# Patient Record
Sex: MALE | Race: WHITE | NOT HISPANIC OR LATINO | Employment: OTHER | URBAN - METROPOLITAN AREA
[De-identification: names, ages, dates, MRNs, and addresses within clinical notes are randomized per-mention and may not be internally consistent; named-entity substitution may affect disease eponyms.]

---

## 2017-06-27 ENCOUNTER — TRANSCRIBE ORDERS (OUTPATIENT)
Dept: ADMINISTRATIVE | Facility: HOSPITAL | Age: 77
End: 2017-06-27

## 2017-06-27 ENCOUNTER — APPOINTMENT (OUTPATIENT)
Dept: LAB | Facility: HOSPITAL | Age: 77
End: 2017-06-27
Payer: MEDICARE

## 2017-06-27 DIAGNOSIS — R31.0 GROSS HEMATURIA: Primary | ICD-10-CM

## 2017-06-27 PROCEDURE — 88112 CYTOPATH CELL ENHANCE TECH: CPT

## 2017-06-30 ENCOUNTER — HOSPITAL ENCOUNTER (OUTPATIENT)
Dept: RADIOLOGY | Facility: HOSPITAL | Age: 77
Discharge: HOME | End: 2017-06-30
Payer: MEDICARE

## 2017-06-30 DIAGNOSIS — R31.0 GROSS HEMATURIA: ICD-10-CM

## 2017-06-30 PROCEDURE — 74178 CT ABD&PLV WO CNTR FLWD CNTR: CPT

## 2017-06-30 RX ADMIN — IOHEXOL 100 ML: 350 INJECTION, SOLUTION INTRAVENOUS at 08:12

## 2017-07-26 ENCOUNTER — ALLSCRIPTS OFFICE VISIT (OUTPATIENT)
Dept: OTHER | Facility: OTHER | Age: 77
End: 2017-07-26

## 2017-07-26 LAB
BILIRUB UR QL STRIP: NORMAL
CLARITY UR: NORMAL
COLOR UR: YELLOW
GLUCOSE (HISTORICAL): NORMAL
HGB UR QL STRIP.AUTO: NORMAL
KETONES UR STRIP-MCNC: NORMAL MG/DL
LEUKOCYTE ESTERASE UR QL STRIP: NORMAL
NITRITE UR QL STRIP: NORMAL
PH UR STRIP.AUTO: 5.5 [PH]
PROT UR STRIP-MCNC: 30 MG/DL
SP GR UR STRIP.AUTO: 1.02
UROBILINOGEN UR QL STRIP.AUTO: 0.2

## 2017-12-03 ENCOUNTER — APPOINTMENT (EMERGENCY)
Dept: RADIOLOGY | Facility: HOSPITAL | Age: 77
End: 2017-12-03
Payer: MEDICARE

## 2017-12-03 ENCOUNTER — HOSPITAL ENCOUNTER (EMERGENCY)
Facility: HOSPITAL | Age: 77
Discharge: HOME | End: 2017-12-03
Attending: EMERGENCY MEDICINE | Admitting: EMERGENCY MEDICINE
Payer: MEDICARE

## 2017-12-03 VITALS
WEIGHT: 190 LBS | OXYGEN SATURATION: 96 % | HEART RATE: 84 BPM | RESPIRATION RATE: 19 BRPM | TEMPERATURE: 98.7 F | HEIGHT: 66 IN | SYSTOLIC BLOOD PRESSURE: 153 MMHG | DIASTOLIC BLOOD PRESSURE: 81 MMHG | BODY MASS INDEX: 30.53 KG/M2

## 2017-12-03 DIAGNOSIS — R31.9 HEMATURIA: ICD-10-CM

## 2017-12-03 DIAGNOSIS — N20.0 KIDNEY STONE: Primary | ICD-10-CM

## 2017-12-03 DIAGNOSIS — R10.9 FLANK PAIN: ICD-10-CM

## 2017-12-03 LAB
ALBUMIN SERPL BCP-MCNC: 3.9 G/DL (ref 3.5–5)
ALP SERPL-CCNC: 66 U/L (ref 46–116)
ALT SERPL W P-5'-P-CCNC: 32 U/L (ref 12–78)
ANION GAP SERPL CALCULATED.3IONS-SCNC: 6 MMOL/L (ref 4–13)
AST SERPL W P-5'-P-CCNC: 23 U/L (ref 5–45)
ATRIAL RATE: 75 BPM
BACTERIA UR QL AUTO: ABNORMAL /HPF
BASOPHILS # BLD AUTO: 0.04 THOUSANDS/ΜL (ref 0–0.1)
BASOPHILS NFR BLD AUTO: 1 % (ref 0–1)
BILIRUB SERPL-MCNC: 0.5 MG/DL (ref 0.2–1)
BILIRUB UR QL STRIP: ABNORMAL
BUN SERPL-MCNC: 22 MG/DL (ref 5–25)
CALCIUM SERPL-MCNC: 8.6 MG/DL (ref 8.3–10.1)
CHLORIDE SERPL-SCNC: 105 MMOL/L (ref 100–108)
CLARITY UR: ABNORMAL
CO2 SERPL-SCNC: 29 MMOL/L (ref 21–32)
COLOR UR: YELLOW
CREAT SERPL-MCNC: 1.2 MG/DL (ref 0.6–1.3)
EOSINOPHIL # BLD AUTO: 0.11 THOUSAND/ΜL (ref 0–0.61)
EOSINOPHIL NFR BLD AUTO: 1 % (ref 0–6)
ERYTHROCYTE [DISTWIDTH] IN BLOOD BY AUTOMATED COUNT: 13.5 % (ref 11.6–15.1)
GFR SERPL CREATININE-BSD FRML MDRD: 58 ML/MIN/1.73SQ M
GLUCOSE SERPL-MCNC: 96 MG/DL (ref 65–140)
GLUCOSE UR STRIP-MCNC: NEGATIVE MG/DL
HCT VFR BLD AUTO: 41.5 % (ref 36.5–49.3)
HGB BLD-MCNC: 14.5 G/DL (ref 12–17)
HGB UR QL STRIP.AUTO: ABNORMAL
HYALINE CASTS #/AREA URNS LPF: ABNORMAL /LPF
KETONES UR STRIP-MCNC: NEGATIVE MG/DL
LEUKOCYTE ESTERASE UR QL STRIP: NEGATIVE
LYMPHOCYTES # BLD AUTO: 1.32 THOUSANDS/ΜL (ref 0.6–4.47)
LYMPHOCYTES NFR BLD AUTO: 15 % (ref 14–44)
MCH RBC QN AUTO: 33.6 PG (ref 26.8–34.3)
MCHC RBC AUTO-ENTMCNC: 34.9 G/DL (ref 31.4–37.4)
MCV RBC AUTO: 96 FL (ref 82–98)
MONOCYTES # BLD AUTO: 1.05 THOUSAND/ΜL (ref 0.17–1.22)
MONOCYTES NFR BLD AUTO: 12 % (ref 4–12)
MUCOUS THREADS UR QL AUTO: ABNORMAL
NEUTROPHILS # BLD AUTO: 6.07 THOUSANDS/ΜL (ref 1.85–7.62)
NEUTS SEG NFR BLD AUTO: 71 % (ref 43–75)
NITRITE UR QL STRIP: NEGATIVE
NON-SQ EPI CELLS URNS QL MICRO: ABNORMAL /HPF
NRBC BLD AUTO-RTO: 0 /100 WBCS
P AXIS: 34 DEGREES
PH UR STRIP.AUTO: 5.5 [PH] (ref 4.5–8)
PLATELET # BLD AUTO: 194 THOUSANDS/UL (ref 149–390)
PMV BLD AUTO: 9.8 FL (ref 8.9–12.7)
POTASSIUM SERPL-SCNC: 3.7 MMOL/L (ref 3.5–5.3)
PR INTERVAL: 168 MS
PROT SERPL-MCNC: 7.3 G/DL (ref 6.4–8.2)
PROT UR STRIP-MCNC: ABNORMAL MG/DL
QRS AXIS: -19 DEGREES
QRSD INTERVAL: 102 MS
QT INTERVAL: 406 MS
QTC INTERVAL: 453 MS
RBC # BLD AUTO: 4.31 MILLION/UL (ref 3.88–5.62)
RBC #/AREA URNS AUTO: ABNORMAL /HPF
SODIUM SERPL-SCNC: 140 MMOL/L (ref 136–145)
SP GR UR STRIP.AUTO: >=1.03 (ref 1–1.03)
SPECIMEN SOURCE: NORMAL
T WAVE AXIS: 1 DEGREES
TROPONIN I BLD-MCNC: 0 NG/ML (ref 0–0.08)
UROBILINOGEN UR QL STRIP.AUTO: 1 E.U./DL
VENTRICULAR RATE: 75 BPM
WBC # BLD AUTO: 8.61 THOUSAND/UL (ref 4.31–10.16)
WBC #/AREA URNS AUTO: ABNORMAL /HPF

## 2017-12-03 PROCEDURE — 36415 COLL VENOUS BLD VENIPUNCTURE: CPT | Performed by: EMERGENCY MEDICINE

## 2017-12-03 PROCEDURE — 84484 ASSAY OF TROPONIN QUANT: CPT

## 2017-12-03 PROCEDURE — 81002 URINALYSIS NONAUTO W/O SCOPE: CPT | Performed by: EMERGENCY MEDICINE

## 2017-12-03 PROCEDURE — 99284 EMERGENCY DEPT VISIT MOD MDM: CPT

## 2017-12-03 PROCEDURE — 96361 HYDRATE IV INFUSION ADD-ON: CPT

## 2017-12-03 PROCEDURE — 80053 COMPREHEN METABOLIC PANEL: CPT | Performed by: EMERGENCY MEDICINE

## 2017-12-03 PROCEDURE — 93005 ELECTROCARDIOGRAM TRACING: CPT | Performed by: EMERGENCY MEDICINE

## 2017-12-03 PROCEDURE — 85025 COMPLETE CBC W/AUTO DIFF WBC: CPT | Performed by: EMERGENCY MEDICINE

## 2017-12-03 PROCEDURE — 81001 URINALYSIS AUTO W/SCOPE: CPT

## 2017-12-03 PROCEDURE — 96360 HYDRATION IV INFUSION INIT: CPT

## 2017-12-03 PROCEDURE — 74177 CT ABD & PELVIS W/CONTRAST: CPT

## 2017-12-03 RX ORDER — VENLAFAXINE HYDROCHLORIDE 150 MG/1
150 CAPSULE, EXTENDED RELEASE ORAL DAILY
COMMUNITY
End: 2019-06-26

## 2017-12-03 RX ORDER — MECLIZINE HYDROCHLORIDE 25 MG/1
25 TABLET ORAL EVERY 8 HOURS PRN
COMMUNITY

## 2017-12-03 RX ORDER — CARVEDILOL 6.25 MG/1
12.5 TABLET ORAL 2 TIMES DAILY WITH MEALS
COMMUNITY

## 2017-12-03 RX ORDER — ROSUVASTATIN CALCIUM 20 MG/1
20 TABLET, COATED ORAL DAILY
COMMUNITY
End: 2018-03-19 | Stop reason: SDUPTHER

## 2017-12-03 RX ORDER — ASPIRIN 325 MG
325 TABLET ORAL DAILY
COMMUNITY
End: 2018-03-19 | Stop reason: SDUPTHER

## 2017-12-03 RX ORDER — TIZANIDINE HYDROCHLORIDE 2 MG/1
2 CAPSULE, GELATIN COATED ORAL 3 TIMES DAILY PRN
Status: ON HOLD | COMMUNITY
End: 2018-01-19 | Stop reason: ALTCHOICE

## 2017-12-03 RX ORDER — PANTOPRAZOLE SODIUM 40 MG/1
40 TABLET, DELAYED RELEASE ORAL DAILY
COMMUNITY
End: 2018-04-16

## 2017-12-03 RX ORDER — VITAMIN B COMPLEX
1 CAPSULE ORAL DAILY
Status: ON HOLD | COMMUNITY
End: 2021-07-06 | Stop reason: ALTCHOICE

## 2017-12-03 RX ORDER — AMLODIPINE BESYLATE 5 MG/1
2.5 TABLET ORAL DAILY
COMMUNITY
End: 2018-03-19 | Stop reason: SDUPTHER

## 2017-12-03 RX ADMIN — SODIUM CHLORIDE 500 ML: 0.9 INJECTION, SOLUTION INTRAVENOUS at 14:44

## 2017-12-03 RX ADMIN — IOHEXOL 100 ML: 350 INJECTION, SOLUTION INTRAVENOUS at 15:57

## 2017-12-03 NOTE — ED ATTENDING ATTESTATION
Greg Jones MD, saw and evaluated the patient  I have discussed the patient with the resident/non-physician practitioner and agree with the resident's/non-physician practitioner's findings, Plan of Care, and MDM as documented in the resident's/non-physician practitioner's note, except where noted  All available labs and Radiology studies were reviewed  At this point I agree with the current assessment done in the Emergency Department  I have conducted an independent evaluation of this patient a history and physical is as follows:  Patient woke up with left flank plain  Severe for about 20 minutes patient had urge to void and moved bowels and do neither  Pain is much better at present    Patient has had kidney stones in the past   Bedside ultrasound by Dr Bj Lopez showed 2 apparent lumens will get formal CT to make sure patient does not have abdominal aortic aneurysm or dissection      Critical Care Time  CritCare Time

## 2017-12-03 NOTE — ED PROVIDER NOTES
History  Chief Complaint   Patient presents with    Flank Pain     Pt has c/o L flank pain since last night  Pt has hx of kidney stones     This is a 66-year-old male presents for evaluation of left flank pain that woke him up out of sleep at 3 in the morning  Patient describes sharp left-sided pain radiating into his groin, with no relieving or exacerbating factors, not affected by position, no associated nausea or vomiting, no associated urinary symptoms  This pain was episodic and  resolved on its own  Patient went to the store with his wife and while at the store had recurrence of the same pain  The pain lasted 20-30 minutes and then subsided on it's own  He tried to go to the bathroom and noticed decreased urination at this time  There was no hematuria  He has a history of kidney stones in the past which required lithotripsy by Dr Kristine Alpers, Urology  He does state that this pain is different  No hx of abdominal surgery, no nausea or vomiting, no CP/SOB  No melena or hematochezia  Currently pain is resolved  Prior to Admission Medications   Prescriptions Last Dose Informant Patient Reported? Taking?    CALCIUM CARBONATE-VITAMIN D PO   Yes Yes   Sig: Take 1 tablet by mouth daily   TiZANidine (ZANAFLEX) 2 MG capsule   Yes Yes   Sig: Take 2 mg by mouth every 6 (six) hours   amLODIPine (NORVASC) 5 mg tablet   Yes Yes   Sig: Take 2 5 mg by mouth daily   aspirin 325 mg tablet   Yes Yes   Sig: Take 325 mg by mouth daily   b complex vitamins capsule   Yes Yes   Sig: Take 1 capsule by mouth daily   carvedilol (COREG) 6 25 mg tablet   Yes Yes   Sig: Take 12 5 mg by mouth 2 (two) times a day with meals   meclizine (ANTIVERT) 25 mg tablet   Yes Yes   Sig: Take 32 mg by mouth 3 (three) times a day as needed for dizziness Spouse states patient has been taking this medication for last 2 weeks   pantoprazole (PROTONIX) 40 mg tablet   Yes Yes   Sig: Take 40 mg by mouth daily   rosuvastatin (CRESTOR) 20 MG tablet Yes Yes   Sig: Take 20 mg by mouth daily   venlafaxine (EFFEXOR-XR) 150 mg 24 hr capsule   Yes Yes   Sig: Take 225 mg by mouth daily      Facility-Administered Medications: None       Past Medical History:   Diagnosis Date    Cancer [facility])     Cardiac disease     Hyperlipidemia     Hypertension     Kidney stone     Prostate CA [facility])        History reviewed  No pertinent surgical history  History reviewed  No pertinent family history  I have reviewed and agree with the history as documented  Social History   Substance Use Topics    Smoking status: Current Some Day Smoker     Types: Cigars    Smokeless tobacco: Current User    Alcohol use Yes        Review of Systems   Constitutional: Negative for appetite change, chills and fever  HENT: Negative for rhinorrhea and sore throat  Eyes: Negative for photophobia and visual disturbance  Respiratory: Negative for cough and shortness of breath  Cardiovascular: Negative for chest pain and palpitations  Gastrointestinal: Positive for abdominal pain  Negative for diarrhea  Genitourinary: Positive for decreased urine volume  Negative for dysuria, frequency and urgency  Skin: Negative for rash  Neurological: Negative for dizziness and weakness  All other systems reviewed and are negative  Physical Exam  ED Triage Vitals [12/03/17 1356]   Temperature Pulse Respirations Blood Pressure SpO2   98 7 °F (37 1 °C) 84 18 149/79 99 %      Temp Source Heart Rate Source Patient Position - Orthostatic VS BP Location FiO2 (%)   Tympanic Monitor Sitting Left arm --      Pain Score       6           Orthostatic Vital Signs  Vitals:    12/03/17 1356 12/03/17 1500 12/03/17 1615   BP: 149/79 131/86 153/81   Pulse: 84 82 84   Patient Position - Orthostatic VS: Sitting         Physical Exam   Constitutional: He is oriented to person, place, and time  He appears well-developed and well-nourished  HENT:   Head: Normocephalic and atraumatic     Right Ear: External ear normal    Left Ear: External ear normal    Mouth/Throat: Oropharynx is clear and moist    Eyes: Conjunctivae and EOM are normal  Pupils are equal, round, and reactive to light  Neck: Normal range of motion  Neck supple  No JVD present  No tracheal deviation present  Cardiovascular: Normal rate, regular rhythm and normal heart sounds  Exam reveals no gallop and no friction rub  No murmur heard  Pulmonary/Chest: Effort normal  No stridor  No respiratory distress  He has no wheezes  He has no rales  Abdominal: Soft  He exhibits no distension and no mass  There is no tenderness  There is no rebound and no guarding  Left flank tender to palpation, abdomen soft without any rebound or guarding, normal bowel sounds   Musculoskeletal: Normal range of motion  He exhibits no edema  Neurological: He is alert and oriented to person, place, and time  No cranial nerve deficit  Skin: Skin is warm and dry  No rash noted  No erythema  No pallor  Psychiatric: He has a normal mood and affect  Nursing note and vitals reviewed        ED Medications  Medications   sodium chloride 0 9 % bolus 500 mL (0 mL Intravenous Stopped 12/3/17 1650)   iohexol (OMNIPAQUE) 350 MG/ML injection (MULTI-DOSE) 100 mL (100 mL Intravenous Given 12/3/17 1557)       Diagnostic Studies  Results Reviewed     Procedure Component Value Units Date/Time    Urine Microscopic [[id number]]  (Abnormal) Collected:  12/03/17 1506    Lab Status:  Final result Specimen:  Urine from Urine, Other Updated:  12/03/17 1606     RBC, UA 30-50 (A) /hpf      WBC, UA 2-4 (A) /hpf      Epithelial Cells None Seen /hpf      Bacteria, UA None Seen /hpf      Hyaline Casts, UA 5-10 (A) /lpf      MUCOUS THREADS Moderate    Comprehensive metabolic panel [[id number]] Collected:  12/03/17 1444    Lab Status:  Final result Specimen:  Blood from Arm, Right Updated:  12/03/17 1528     Sodium 140 mmol/L      Potassium 3 7 mmol/L      Chloride 105 mmol/L      CO2 29 mmol/L      Anion Gap 6 mmol/L      BUN 22 mg/dL      Creatinine 1 20 mg/dL      Glucose 96 mg/dL      Calcium 8 6 mg/dL      AST 23 U/L      ALT 32 U/L      Alkaline Phosphatase 66 U/L      Total Protein 7 3 g/dL      Albumin 3 9 g/dL      Total Bilirubin 0 50 mg/dL      eGFR 58 ml/min/1 73sq m     Narrative:         National Kidney Disease Education Program recommendations are as follows:  GFR calculation is accurate only with a steady state creatinine  Chronic Kidney disease less than 60 ml/min/1 73 sq  meters  Kidney failure less than 15 ml/min/1 73 sq  meters  POCT urinalysis dipstick [[id number]]  (Abnormal) Resulted:  12/03/17 1509    Lab Status:  Final result Updated:  12/03/17 1509    ED Urine Macroscopic [[id number]]  (Abnormal) Collected:  12/03/17 1506    Lab Status:  Final result Specimen:  Urine Updated:  12/03/17 1507     Color, UA Yellow     Clarity, UA Cloudy     pH, UA 5 5     Leukocytes, UA Negative     Nitrite, UA Negative     Protein,  (2+) (A) mg/dl      Glucose, UA Negative mg/dl      Ketones, UA Negative mg/dl      Urobilinogen, UA 1 0 E U /dl      Bilirubin, UA Interference- unable to analyze (A)     Blood, UA Large (A)     Specific Gravity, UA >=1 030    Narrative:       CLINITEK RESULT    POCT troponin [[id number]]  (Normal) Collected:  12/03/17 1444    Lab Status:  Final result Updated:  12/03/17 1458     POC Troponin I 0 00 ng/ml      Specimen Type VENOUS    Narrative:         [employer] i-Stat handheld analyzer 99% cutoff is > 0 08ng/mL in [facility] Emergency Departments    o cTnI 99% cutoff is useful only when applied to patients in the clinical setting of myocardial ischemia  o cTnI 99% cutoff should be interpreted in the context of clinical history, ECG findings and possibly cardiac imaging to establish correct diagnosis  o cTnI 99% cutoff may be suggestive but clearly not indicative of a coronary event without the clinical setting of myocardial ischemia      CBC and differential [[id number]]  (Normal) Collected:  12/03/17 1444    Lab Status:  Final result Specimen:  Blood from Arm, Right Updated:  12/03/17 1455     WBC 8 61 Thousand/uL      RBC 4 31 Million/uL      Hemoglobin 14 5 g/dL      Hematocrit 41 5 %      MCV 96 fL      MCH 33 6 pg      MCHC 34 9 g/dL      RDW 13 5 %      MPV 9 8 fL      Platelets 978 Thousands/uL      nRBC 0 /100 WBCs      Neutrophils Relative 71 %      Lymphocytes Relative 15 %      Monocytes Relative 12 %      Eosinophils Relative 1 %      Basophils Relative 1 %      Neutrophils Absolute 6 07 Thousands/µL      Lymphocytes Absolute 1 32 Thousands/µL      Monocytes Absolute 1 05 Thousand/µL      Eosinophils Absolute 0 11 Thousand/µL      Basophils Absolute 0 04 Thousands/µL                  CT abdomen pelvis w contrast   Final Result by Baltazar Skaggs MD (12/03 1645)         1  2 mm calculus within the left ureterovesical junction resulting in mild hydroureteronephrosis  There is increased left perinephric stranding with somewhat asymmetric phase of enhancement of the left kidney compared to the right, suggestive of    obstructive uropathy  2  Nonobstructing calculus within the mid to lower pole of the right kidney measuring 5 mm  There is mild prominence of the proximal right ureter but without evidence of ureteral calculus  Workstation performed: [id number]               Procedures  Procedures      Phone Consults  ED Phone Contact    ED Course  ED Course as of Dec 03 1756   Jacob Shultz Dec 03, 2017   1439 Bedside ultrasound +L sided hydronephrosis, normal aortic diameter, hypoechoic density of unclear significance posterior to aorta            Identification of Seniors at [address] [place] [address] Most Recent Value   (ISAR) Identification of Seniors at Risk   Before the illness or injury that brought you to the Emergency, did you need someone to help you on a regular basis?   0 Filed at: 12/03/2017 1359   In the last 24 hours, have you needed more help than usual?  0 Filed at: 12/03/2017 1359   Have you been hospitalized for one or more nights during the past 6 months? 0 Filed at: 12/03/2017 1359   In general, do you see well?  0 Filed at: 12/03/2017 1359   In general, do you have serious problems with your memory? 0 Filed at: 12/03/2017 1359   Do you take more than three different medications every day? No data   ISAR Score  0 Filed at: 12/03/2017 1359                          [facility]  Number of Diagnoses or Management Options  Diagnosis management comments: 63-year-old male presents for evaluation of left flank pain, will obtain CBC, CMP to evaluate kidney function, will obtain urinalysis to evaluate for hematuria or urinary tract infection  Will obtain CT abdomen to evaluate the nature of pain, kidney stone vs aortic pathology  Will obtain EKG/Trop given location of pain    CritCare Time    Disposition  Final diagnoses:   Kidney stone   Flank pain   Hematuria     Time reflects when diagnosis was documented in both MDM as applicable and the Disposition within this note     Time User Action Codes Description Comment    12/3/2017  4:13 PM Sandra Armando Add [N20 0] Kidney stone     12/3/2017  4:13 PM Sandra Armando Add [R10 9] Flank pain     12/3/2017  4:13 PM Sandra Armando Add [R31 9] Hematuria       ED Disposition     ED Disposition Condition Comment    [facility]self care      Condition at discharge: Stable        Follow-up Information     Follow up With Specialties Details Why Contact Info Additional [address] Emergency Department Emergency Medicine  If symptoms worsen [address]  [phone]  ED, [address], [place]  [address] [facility]  As needed [address] [facility]  [address] [facility]       Lavonne Acuna MD Urology Schedule an appointment as soon as possible for a visit in 2 days  [phone] 72 [address]  [phone]           Discharge Medication List as of 12/3/2017  5:02 PM      CONTINUE these medications which have NOT CHANGED    Details   amLODIPine (NORVASC) 5 mg tablet Take 2 5 mg by mouth daily, Historical Med      aspirin 325 mg tablet Take 325 mg by mouth daily, Historical Med      b complex vitamins capsule Take 1 capsule by mouth daily, Historical Med      CALCIUM CARBONATE-VITAMIN D PO Take 1 tablet by mouth daily, Historical Med      carvedilol (COREG) 6 25 mg tablet Take 12 5 mg by mouth 2 (two) times a day with meals, Historical Med      meclizine (ANTIVERT) 25 mg tablet Take 32 mg by mouth 3 (three) times a day as needed for dizziness Spouse states patient has been taking this medication for last 2 weeks, Historical Med      pantoprazole (PROTONIX) 40 mg tablet Take 40 mg by mouth daily, Historical Med      rosuvastatin (CRESTOR) 20 MG tablet Take 20 mg by mouth daily, Historical Med      TiZANidine (ZANAFLEX) 2 MG capsule Take 2 mg by mouth every 6 (six) hours, Historical Med      venlafaxine (EFFEXOR-XR) 150 mg 24 hr capsule Take 225 mg by mouth daily, Historical Med           No discharge procedures on file  ED Provider  Attending physically available and evaluated [facility]  I managed the patient along with the ED Attending      Electronically Signed by         Jazmyne Blakely MD  Resident  12/03/17 9341

## 2017-12-03 NOTE — DISCHARGE INSTRUCTIONS
Please return to the Emergency Department if your symptoms fail to get better or you develop new, concerning symptoms  If you develop fever, chills, worsening pain, trouble peeing, nausea or vomiting seek care immediately  Otherwise follow up with your primary care provider in the next 2-3 days for further care  Kidney Stones   WHAT YOU NEED TO KNOW:   Kidney stones form in the urinary system when the water and waste in your urine are out of balance  When this happens, certain types of waste crystals separate from the urine  The crystals build up and form kidney stones  You may have 1 or more kidney stones  DISCHARGE INSTRUCTIONS:   Return to the emergency department if:   · You have vomiting that is not relieved by medicine  Contact your healthcare provider if:   · You have a fever  · You have trouble passing urine  · You see blood in your urine  · You have severe pain  · You have any questions or concerns about your condition or care  Medicines:   · NSAIDs , such as ibuprofen, help decrease swelling, pain, and fever  This medicine is available with or without a doctor's order  NSAIDs can cause stomach bleeding or kidney problems in certain people  If you take blood thinner medicine, always ask your healthcare provider if NSAIDs are safe for you  Always read the medicine label and follow directions  · Prescription medicine  may be given  Ask how to take this medicine safely  · Medicines  to balance your electrolytes may be needed  · Take your medicine as directed  Contact your healthcare provider if you think your medicine is not helping or if you have side effects  Tell him or her if you are allergic to any medicine  Keep a list of the medicines, vitamins, and herbs you take  Include the amounts, and when and why you take them  Bring the list or the pill bottles to follow-up visits  Carry your medicine list with you in case of an emergency    Follow up with your healthcare provider as directed: You may need to return for more tests  Write down your questions so you remember to ask them during your visits  Self-care:   · Drink plenty of liquids  Your healthcare provider may tell you to drink at least 8 to 12 (eight-ounce) cups of liquids each day  This helps flush out the kidney stones when you urinate  Water is the best liquid to drink  · Strain your urine every time you go to the bathroom  Urinate through a strainer or a piece of thin cloth to catch the stones  Take the stones to your healthcare provider so they can be sent to the lab for tests  This will help your healthcare providers plan the best treatment for you  · Eat a variety of healthy foods  Healthy foods include fruits, vegetables, whole-grain breads, low-fat dairy products, beans, and fish  You may need to limit how much sodium (salt) or protein you eat  Ask for information about the best foods for you  · Stay active  Your stones may pass more easily by if you stay active  Ask about the best activities for you  After you pass your kidney stones:  Once you have passed your kidney stones, your healthcare provider may  order a 24-hour urine test  Results from a 24-hour urine test will help your healthcare provider plan ways to prevent more stones from forming  If you are told to do a 24-hour test, your healthcare provider will give you more instructions  © 2017 [employer] Information is for End User's use only and may not be sold, redistributed or otherwise used for commercial purposes  All illustrations and images included in CareNotes® are the copyrighted property of [employer]  or Denzel Leong  The above information is an  only  It is not intended as medical advice for individual conditions or treatments  Talk to your doctor, nurse or pharmacist before following any medical regimen to see if it is safe and effective for you

## 2017-12-24 ENCOUNTER — HOSPITAL ENCOUNTER (INPATIENT)
Facility: HOSPITAL | Age: 77
LOS: 1 days | Discharge: HOME | DRG: 694 | End: 2017-12-25
Attending: EMERGENCY MEDICINE | Admitting: GENERAL PRACTICE
Payer: MEDICARE

## 2017-12-24 ENCOUNTER — APPOINTMENT (EMERGENCY)
Dept: RADIOLOGY | Facility: HOSPITAL | Age: 77
DRG: 694 | End: 2017-12-24
Payer: MEDICARE

## 2017-12-24 DIAGNOSIS — N20.0 KIDNEY STONE: ICD-10-CM

## 2017-12-24 DIAGNOSIS — R10.9 RIGHT FLANK PAIN: Primary | ICD-10-CM

## 2017-12-24 PROBLEM — I16.0 HYPERTENSIVE URGENCY: Status: ACTIVE | Noted: 2017-12-24

## 2017-12-24 LAB
ALBUMIN SERPL BCP-MCNC: 3.8 G/DL (ref 3.5–5)
ALP SERPL-CCNC: 76 U/L (ref 46–116)
ALT SERPL W P-5'-P-CCNC: 33 U/L (ref 12–78)
ANION GAP SERPL CALCULATED.3IONS-SCNC: 7 MMOL/L (ref 4–13)
AST SERPL W P-5'-P-CCNC: 23 U/L (ref 5–45)
ATRIAL RATE: 72 BPM
BACTERIA UR QL AUTO: ABNORMAL /HPF
BASOPHILS # BLD AUTO: 0.05 THOUSANDS/ΜL (ref 0–0.1)
BASOPHILS NFR BLD AUTO: 1 % (ref 0–1)
BILIRUB SERPL-MCNC: 0.52 MG/DL (ref 0.2–1)
BILIRUB UR QL STRIP: NEGATIVE
BUN SERPL-MCNC: 18 MG/DL (ref 5–25)
CALCIUM SERPL-MCNC: 9.3 MG/DL (ref 8.3–10.1)
CHLORIDE SERPL-SCNC: 105 MMOL/L (ref 100–108)
CLARITY UR: CLEAR
CO2 SERPL-SCNC: 30 MMOL/L (ref 21–32)
COLOR UR: YELLOW
COLOR, POC: NORMAL
CREAT SERPL-MCNC: 1.03 MG/DL (ref 0.6–1.3)
EOSINOPHIL # BLD AUTO: 0.2 THOUSAND/ΜL (ref 0–0.61)
EOSINOPHIL NFR BLD AUTO: 3 % (ref 0–6)
ERYTHROCYTE [DISTWIDTH] IN BLOOD BY AUTOMATED COUNT: 13.4 % (ref 11.6–15.1)
GFR SERPL CREATININE-BSD FRML MDRD: 70 ML/MIN/1.73SQ M
GLUCOSE SERPL-MCNC: 161 MG/DL (ref 65–140)
GLUCOSE UR STRIP-MCNC: NEGATIVE MG/DL
HCT VFR BLD AUTO: 44.9 % (ref 36.5–49.3)
HGB BLD-MCNC: 15.6 G/DL (ref 12–17)
HGB UR QL STRIP.AUTO: ABNORMAL
HYALINE CASTS #/AREA URNS LPF: ABNORMAL /LPF
KETONES UR STRIP-MCNC: NEGATIVE MG/DL
LEUKOCYTE ESTERASE UR QL STRIP: NEGATIVE
LYMPHOCYTES # BLD AUTO: 1.28 THOUSANDS/ΜL (ref 0.6–4.47)
LYMPHOCYTES NFR BLD AUTO: 18 % (ref 14–44)
MCH RBC QN AUTO: 33.8 PG (ref 26.8–34.3)
MCHC RBC AUTO-ENTMCNC: 34.7 G/DL (ref 31.4–37.4)
MCV RBC AUTO: 97 FL (ref 82–98)
MONOCYTES # BLD AUTO: 0.9 THOUSAND/ΜL (ref 0.17–1.22)
MONOCYTES NFR BLD AUTO: 13 % (ref 4–12)
NEUTROPHILS # BLD AUTO: 4.66 THOUSANDS/ΜL (ref 1.85–7.62)
NEUTS SEG NFR BLD AUTO: 65 % (ref 43–75)
NITRITE UR QL STRIP: NEGATIVE
NON-SQ EPI CELLS URNS QL MICRO: ABNORMAL /HPF
NRBC BLD AUTO-RTO: 0 /100 WBCS
P AXIS: 64 DEGREES
PH UR STRIP.AUTO: 6.5 [PH] (ref 4.5–8)
PLATELET # BLD AUTO: 196 THOUSANDS/UL (ref 149–390)
PMV BLD AUTO: 9.8 FL (ref 8.9–12.7)
POTASSIUM SERPL-SCNC: 3.9 MMOL/L (ref 3.5–5.3)
PR INTERVAL: 200 MS
PROT SERPL-MCNC: 8 G/DL (ref 6.4–8.2)
PROT UR STRIP-MCNC: ABNORMAL MG/DL
QRS AXIS: -24 DEGREES
QRSD INTERVAL: 112 MS
QT INTERVAL: 452 MS
QTC INTERVAL: 491 MS
RBC # BLD AUTO: 4.62 MILLION/UL (ref 3.88–5.62)
RBC #/AREA URNS AUTO: ABNORMAL /HPF
SODIUM SERPL-SCNC: 142 MMOL/L (ref 136–145)
SP GR UR STRIP.AUTO: 1.02 (ref 1–1.03)
SPECIMEN SOURCE: NORMAL
T WAVE AXIS: 26 DEGREES
TROPONIN I BLD-MCNC: 0 NG/ML (ref 0–0.08)
UROBILINOGEN UR QL STRIP.AUTO: 0.2 E.U./DL
VENTRICULAR RATE: 71 BPM
WBC # BLD AUTO: 7.1 THOUSAND/UL (ref 4.31–10.16)
WBC #/AREA URNS AUTO: ABNORMAL /HPF

## 2017-12-24 PROCEDURE — 81002 URINALYSIS NONAUTO W/O SCOPE: CPT | Performed by: EMERGENCY MEDICINE

## 2017-12-24 PROCEDURE — 81001 URINALYSIS AUTO W/SCOPE: CPT

## 2017-12-24 PROCEDURE — 84484 ASSAY OF TROPONIN QUANT: CPT

## 2017-12-24 PROCEDURE — 96361 HYDRATE IV INFUSION ADD-ON: CPT

## 2017-12-24 PROCEDURE — 36415 COLL VENOUS BLD VENIPUNCTURE: CPT | Performed by: EMERGENCY MEDICINE

## 2017-12-24 PROCEDURE — 93005 ELECTROCARDIOGRAM TRACING: CPT | Performed by: EMERGENCY MEDICINE

## 2017-12-24 PROCEDURE — 80053 COMPREHEN METABOLIC PANEL: CPT | Performed by: EMERGENCY MEDICINE

## 2017-12-24 PROCEDURE — 96375 TX/PRO/DX INJ NEW DRUG ADDON: CPT

## 2017-12-24 PROCEDURE — 96376 TX/PRO/DX INJ SAME DRUG ADON: CPT

## 2017-12-24 PROCEDURE — 99285 EMERGENCY DEPT VISIT HI MDM: CPT

## 2017-12-24 PROCEDURE — 93005 ELECTROCARDIOGRAM TRACING: CPT

## 2017-12-24 PROCEDURE — 96374 THER/PROPH/DIAG INJ IV PUSH: CPT

## 2017-12-24 PROCEDURE — 85025 COMPLETE CBC W/AUTO DIFF WBC: CPT | Performed by: EMERGENCY MEDICINE

## 2017-12-24 PROCEDURE — 87040 BLOOD CULTURE FOR BACTERIA: CPT | Performed by: INTERNAL MEDICINE

## 2017-12-24 PROCEDURE — 74177 CT ABD & PELVIS W/CONTRAST: CPT

## 2017-12-24 RX ORDER — TAMSULOSIN HYDROCHLORIDE 0.4 MG/1
0.4 CAPSULE ORAL
Status: DISCONTINUED | OUTPATIENT
Start: 2017-12-24 | End: 2017-12-25 | Stop reason: HOSPADM

## 2017-12-24 RX ORDER — SODIUM CHLORIDE 9 MG/ML
125 INJECTION, SOLUTION INTRAVENOUS CONTINUOUS
Status: DISCONTINUED | OUTPATIENT
Start: 2017-12-24 | End: 2017-12-25 | Stop reason: HOSPADM

## 2017-12-24 RX ORDER — HEPARIN SODIUM 5000 [USP'U]/ML
5000 INJECTION, SOLUTION INTRAVENOUS; SUBCUTANEOUS EVERY 8 HOURS SCHEDULED
Status: DISCONTINUED | OUTPATIENT
Start: 2017-12-24 | End: 2017-12-25 | Stop reason: HOSPADM

## 2017-12-24 RX ORDER — ATORVASTATIN CALCIUM 40 MG/1
40 TABLET, FILM COATED ORAL
Status: DISCONTINUED | OUTPATIENT
Start: 2017-12-24 | End: 2017-12-25 | Stop reason: HOSPADM

## 2017-12-24 RX ORDER — LABETALOL HYDROCHLORIDE 5 MG/ML
10 INJECTION, SOLUTION INTRAVENOUS EVERY 4 HOURS PRN
Status: DISCONTINUED | OUTPATIENT
Start: 2017-12-24 | End: 2017-12-25 | Stop reason: HOSPADM

## 2017-12-24 RX ORDER — TIZANIDINE 2 MG/1
2 TABLET ORAL EVERY 6 HOURS SCHEDULED
Status: DISCONTINUED | OUTPATIENT
Start: 2017-12-24 | End: 2017-12-25 | Stop reason: HOSPADM

## 2017-12-24 RX ORDER — ASPIRIN 325 MG
325 TABLET ORAL DAILY
Status: DISCONTINUED | OUTPATIENT
Start: 2017-12-24 | End: 2017-12-25 | Stop reason: HOSPADM

## 2017-12-24 RX ORDER — ACETAMINOPHEN 325 MG/1
650 TABLET ORAL EVERY 6 HOURS PRN
Status: DISCONTINUED | OUTPATIENT
Start: 2017-12-24 | End: 2017-12-24

## 2017-12-24 RX ORDER — CARVEDILOL 12.5 MG/1
12.5 TABLET ORAL 2 TIMES DAILY WITH MEALS
Status: DISCONTINUED | OUTPATIENT
Start: 2017-12-24 | End: 2017-12-25 | Stop reason: HOSPADM

## 2017-12-24 RX ORDER — PANTOPRAZOLE SODIUM 40 MG/1
40 TABLET, DELAYED RELEASE ORAL DAILY
Status: DISCONTINUED | OUTPATIENT
Start: 2017-12-24 | End: 2017-12-25 | Stop reason: HOSPADM

## 2017-12-24 RX ORDER — KETOROLAC TROMETHAMINE 30 MG/ML
15 INJECTION, SOLUTION INTRAMUSCULAR; INTRAVENOUS ONCE
Status: COMPLETED | OUTPATIENT
Start: 2017-12-24 | End: 2017-12-24

## 2017-12-24 RX ORDER — AMLODIPINE BESYLATE 2.5 MG/1
2.5 TABLET ORAL DAILY
Status: DISCONTINUED | OUTPATIENT
Start: 2017-12-24 | End: 2017-12-25 | Stop reason: HOSPADM

## 2017-12-24 RX ORDER — ACETAMINOPHEN 325 MG/1
650 TABLET ORAL EVERY 6 HOURS PRN
Status: DISCONTINUED | OUTPATIENT
Start: 2017-12-24 | End: 2017-12-25 | Stop reason: HOSPADM

## 2017-12-24 RX ORDER — OXYCODONE HYDROCHLORIDE 5 MG/1
5 TABLET ORAL EVERY 4 HOURS PRN
Status: DISCONTINUED | OUTPATIENT
Start: 2017-12-24 | End: 2017-12-25 | Stop reason: HOSPADM

## 2017-12-24 RX ADMIN — HYDROMORPHONE HYDROCHLORIDE 0.5 MG: 1 INJECTION, SOLUTION INTRAMUSCULAR; INTRAVENOUS; SUBCUTANEOUS at 09:50

## 2017-12-24 RX ADMIN — ATORVASTATIN CALCIUM 40 MG: 40 TABLET, FILM COATED ORAL at 16:12

## 2017-12-24 RX ADMIN — TIZANIDINE 2 MG: 2 TABLET ORAL at 18:52

## 2017-12-24 RX ADMIN — NICOTINE 1 PATCH: 7 PATCH, EXTENDED RELEASE TRANSDERMAL at 13:51

## 2017-12-24 RX ADMIN — TAMSULOSIN HYDROCHLORIDE 0.4 MG: 0.4 CAPSULE ORAL at 17:18

## 2017-12-24 RX ADMIN — KETOROLAC TROMETHAMINE 15 MG: 30 INJECTION, SOLUTION INTRAMUSCULAR at 06:22

## 2017-12-24 RX ADMIN — SODIUM CHLORIDE 125 ML/HR: 0.9 INJECTION, SOLUTION INTRAVENOUS at 20:40

## 2017-12-24 RX ADMIN — TIZANIDINE 2 MG: 2 TABLET ORAL at 23:18

## 2017-12-24 RX ADMIN — OXYCODONE HYDROCHLORIDE 5 MG: 5 TABLET ORAL at 20:40

## 2017-12-24 RX ADMIN — SODIUM CHLORIDE 125 ML/HR: 0.9 INJECTION, SOLUTION INTRAVENOUS at 13:52

## 2017-12-24 RX ADMIN — HEPARIN SODIUM 5000 UNITS: 5000 INJECTION, SOLUTION INTRAVENOUS; SUBCUTANEOUS at 13:51

## 2017-12-24 RX ADMIN — ASPIRIN 325 MG: 325 TABLET ORAL at 14:03

## 2017-12-24 RX ADMIN — VENLAFAXINE HYDROCHLORIDE 225 MG: 75 CAPSULE, EXTENDED RELEASE ORAL at 15:59

## 2017-12-24 RX ADMIN — IOHEXOL 100 ML: 350 INJECTION, SOLUTION INTRAVENOUS at 06:28

## 2017-12-24 RX ADMIN — LABETALOL 20 MG/4 ML (5 MG/ML) INTRAVENOUS SYRINGE 10 MG: at 13:51

## 2017-12-24 RX ADMIN — CARVEDILOL 12.5 MG: 12.5 TABLET, FILM COATED ORAL at 16:12

## 2017-12-24 RX ADMIN — HYDROMORPHONE HYDROCHLORIDE 0.5 MG: 1 INJECTION, SOLUTION INTRAMUSCULAR; INTRAVENOUS; SUBCUTANEOUS at 05:53

## 2017-12-24 RX ADMIN — HEPARIN SODIUM 5000 UNITS: 5000 INJECTION, SOLUTION INTRAVENOUS; SUBCUTANEOUS at 21:01

## 2017-12-24 RX ADMIN — SODIUM CHLORIDE 1000 ML: 0.9 INJECTION, SOLUTION INTRAVENOUS at 05:31

## 2017-12-24 RX ADMIN — PANTOPRAZOLE SODIUM 40 MG: 40 TABLET, DELAYED RELEASE ORAL at 13:51

## 2017-12-24 RX ADMIN — OXYCODONE HYDROCHLORIDE 5 MG: 5 TABLET ORAL at 15:59

## 2017-12-24 RX ADMIN — ZINC 1 TABLET: TAB ORAL at 14:03

## 2017-12-24 RX ADMIN — AMLODIPINE BESYLATE 2.5 MG: 2.5 TABLET ORAL at 13:51

## 2017-12-24 NOTE — ED PROVIDER NOTES
History  Chief Complaint   Patient presents with    Flank Pain     pt c/o right flank pain and 3 episodes of bowel movements this morning  HPI  49-year-old male with history of renal calculus presents for evaluation of right flank pain  Patient states he was in usual state of health until waking up at approximately 03:00 this morning with acute onset right flank pain  Pain not radiate anywhere  Was associated with nausea without vomiting  Patient had 3 bowel movements are soft, denies any melena or hematochezia  Patient denies any chest pain shortness of breath  Patient seen in the emergency department on 3 December was discharged with renal stone on the left  Prior to Admission Medications   Prescriptions Last Dose Informant Patient Reported? Taking?    CALCIUM CARBONATE-VITAMIN D PO   Yes No   Sig: Take 1 tablet by mouth daily   TiZANidine (ZANAFLEX) 2 MG capsule   Yes No   Sig: Take 2 mg by mouth every 6 (six) hours   amLODIPine (NORVASC) 5 mg tablet 12/24/2017 at 1300  Yes Yes   Sig: Take 2 5 mg by mouth daily   aspirin 325 mg tablet 12/24/2017 at 1300  Yes Yes   Sig: Take 325 mg by mouth daily   b complex vitamins capsule 12/23/2017 at Unknown time  Yes Yes   Sig: Take 1 capsule by mouth daily   carvedilol (COREG) 6 25 mg tablet   Yes No   Sig: Take 12 5 mg by mouth 2 (two) times a day with meals   meclizine (ANTIVERT) 25 mg tablet   Yes No   Sig: Take 32 mg by mouth 3 (three) times a day as needed for dizziness Spouse states patient has been taking this medication for last 2 weeks   pantoprazole (PROTONIX) 40 mg tablet   Yes No   Sig: Take 40 mg by mouth daily   rosuvastatin (CRESTOR) 20 MG tablet   Yes No   Sig: Take 20 mg by mouth daily   venlafaxine (EFFEXOR-XR) 150 mg 24 hr capsule   Yes No   Sig: Take 225 mg by mouth daily      Facility-Administered Medications: None       Past Medical History:   Diagnosis Date    Arthritis     Cancer ([facility] [address] )     Cardiac disease     Coronary artery disease  Hyperlipidemia     Hypertension     Kidney stone     Prostate CA [facility])        History reviewed  No pertinent surgical history  History reviewed  No pertinent family history  I have reviewed and agree with the history as documented  Social History   Substance Use Topics    Smoking status: Current Some Day Smoker     Types: Cigars    Smokeless tobacco: Current User      Comment: declined    Alcohol use 1 2 oz/week     1 Glasses of wine, 1 Cans of beer per week      Comment: occasionally        Review of Systems   Constitutional: Negative  HENT: Negative  Eyes: Negative  Respiratory: Negative  Cardiovascular: Negative  Gastrointestinal: Negative  Endocrine: Negative  Genitourinary: Positive for flank pain  Skin: Negative  Allergic/Immunologic: Negative  Neurological: Negative  Hematological: Negative  Psychiatric/Behavioral: Negative  Physical Exam  ED Triage Vitals [12/24/17 0511]   Temperature Pulse Respirations Blood Pressure SpO2   98 4 °F (36 9 °C) 86 18 (!) 175/93 98 %      Temp Source Heart Rate Source Patient Position - Orthostatic VS BP Location FiO2 (%)   Tympanic Monitor Sitting Right arm --      Pain Score       Worst Possible Pain           Orthostatic Vital Signs  Vitals:    12/24/17 1215 12/24/17 1250 12/24/17 1500 12/24/17 2300   BP: (!) 178/90 (!) 178/90 145/82 119/71   Pulse: 86 94 81 85   Patient Position - Orthostatic VS:  Lying Lying Lying       Physical Exam   Constitutional: He is oriented to person, place, and time  He appears well-developed and well-nourished  No distress  HENT:   Head: Normocephalic and atraumatic  Right Ear: External ear normal    Left Ear: External ear normal    Mouth/Throat: Oropharynx is clear and moist    Eyes: Conjunctivae and EOM are normal  Pupils are equal, round, and reactive to light  Right eye exhibits no discharge  Left eye exhibits no discharge  No scleral icterus  Neck: Normal range of motion   Neck supple  No tracheal deviation present  No thyromegaly present  Cardiovascular: Normal rate, regular rhythm and intact distal pulses  Exam reveals no gallop and no friction rub  No murmur heard  Pulmonary/Chest: Effort normal and breath sounds normal  No stridor  No respiratory distress  He has no wheezes  He has no rales  Abdominal: Soft  Bowel sounds are normal  He exhibits no distension  There is tenderness  There is no rebound and no guarding  Patient has tenderness to right flank  Musculoskeletal: Normal range of motion  He exhibits no edema or deformity  Neurological: He is alert and oriented to person, place, and time  No cranial nerve deficit  Skin: Skin is warm and dry  No rash noted  He is not diaphoretic  No erythema  Psychiatric: He has a normal mood and affect  His behavior is normal  Thought content normal    Nursing note and vitals reviewed        ED Medications  Medications   amLODIPine (NORVASC) tablet 2 5 mg (2 5 mg Oral Given 12/24/17 1351)   aspirin tablet 325 mg (325 mg Oral Given 12/24/17 1403)   multivitamin stress formula tablet 1 tablet (1 tablet Oral Given 12/24/17 1403)   carvedilol (COREG) tablet 12 5 mg (12 5 mg Oral Given 12/24/17 1612)   pantoprazole (PROTONIX) EC tablet 40 mg (40 mg Oral Given 12/24/17 1351)   atorvastatin (LIPITOR) tablet 40 mg (40 mg Oral Given 12/24/17 1612)   tiZANidine (ZANAFLEX) tablet 2 mg (2 mg Oral Given 12/24/17 2318)   venlafaxine (EFFEXOR-XR) 24 hr capsule 225 mg (225 mg Oral Given 12/24/17 1559)   sodium chloride 0 9 % infusion (125 mL/hr Intravenous New Bag 12/24/17 2040)   nicotine (NICODERM CQ) 7 mg/24hr TD 24 hr patch 1 patch (1 patch Transdermal Medication Applied 12/24/17 1351)   heparin (porcine) subcutaneous injection 5,000 Units (5,000 Units Subcutaneous Given 12/24/17 2101)   labetalol (NORMODYNE) injection 10 mg (10 mg Intravenous Given 12/24/17 1351)   HYDROmorphone (DILAUDID) 1 mg/mL injection 1 mg (not administered) oxyCODONE (ROXICODONE) IR tablet 5 mg (5 mg Oral Given 12/24/17 2040)   acetaminophen (TYLENOL) tablet 650 mg (not administered)   tamsulosin (FLOMAX) capsule 0 4 mg (0 4 mg Oral Given 12/24/17 1718)   sodium chloride 0 9 % bolus 1,000 mL (0 mL Intravenous Stopped 12/24/17 0705)   HYDROmorphone (DILAUDID) 1 mg/mL injection 0 5 mg (0 5 mg Intravenous Given 12/24/17 0553)   ketorolac (TORADOL) injection 15 mg (15 mg Intravenous Given 12/24/17 0622)   iohexol (OMNIPAQUE) 350 MG/ML injection (MULTI-DOSE) 100 mL (100 mL Intravenous Given 12/24/17 0222)   HYDROmorphone (DILAUDID) 1 mg/mL injection 0 5 mg (0 5 mg Intravenous Given 12/24/17 0950)       Diagnostic Studies  Results Reviewed     Procedure Component Value Units Date/Time    Urine Microscopic [[id number]]  (Abnormal) Collected:  12/24/17 0603    Lab Status:  Final result Specimen:  Urine from Urine, Clean Catch Updated:  12/24/17 0626     RBC, UA Innumerable (A) /hpf      WBC, UA None Seen /hpf      Epithelial Cells None Seen /hpf      Bacteria, UA None Seen /hpf      Hyaline Casts, UA None Seen /lpf     POCT troponin [[id number]]  (Normal) Collected:  12/24/17 0608    Lab Status:  Final result Updated:  12/24/17 0621     POC Troponin I 0 00 ng/ml      Specimen Type VENOUS    Narrative:         [employer] i-Stat handheld analyzer 99% cutoff is > 0 08ng/mL in [facility] Emergency Departments    o cTnI 99% cutoff is useful only when applied to patients in the clinical setting of myocardial ischemia  o cTnI 99% cutoff should be interpreted in the context of clinical history, ECG findings and possibly cardiac imaging to establish correct diagnosis  o cTnI 99% cutoff may be suggestive but clearly not indicative of a coronary event without the clinical setting of myocardial ischemia      Comprehensive metabolic panel [[id number]]  (Abnormal) Collected:  12/24/17 0531    Lab Status:  Final result Specimen:  Blood from Arm, Right Updated:  12/24/17 5022     Sodium 142 mmol/L Potassium 3 9 mmol/L      Chloride 105 mmol/L      CO2 30 mmol/L      Anion Gap 7 mmol/L      BUN 18 mg/dL      Creatinine 1 03 mg/dL      Glucose 161 (H) mg/dL      Calcium 9 3 mg/dL      AST 23 U/L      ALT 33 U/L      Alkaline Phosphatase 76 U/L      Total Protein 8 0 g/dL      Albumin 3 8 g/dL      Total Bilirubin 0 52 mg/dL      eGFR 70 ml/min/1 73sq m     Narrative:         National Kidney Disease Education Program recommendations are as follows:  GFR calculation is accurate only with a steady state creatinine  Chronic Kidney disease less than 60 ml/min/1 73 sq  meters  Kidney failure less than 15 ml/min/1 73 sq  meters      POCT urinalysis dipstick [[id number]]  (Normal) Resulted:  12/24/17 0604    Lab Status:  Final result Specimen:  Urine Updated:  12/24/17 0605     Color, UA -    ED Urine Macroscopic [[id number]]  (Abnormal) Collected:  12/24/17 0603    Lab Status:  Final result Specimen:  Urine Updated:  12/24/17 0603     Color, UA Yellow     Clarity, UA Clear     pH, UA 6 5     Leukocytes, UA Negative     Nitrite, UA Negative     Protein, UA 30 (1+) (A) mg/dl      Glucose, UA Negative mg/dl      Ketones, UA Negative mg/dl      Urobilinogen, UA 0 2 E U /dl      Bilirubin, UA Negative     Blood, UA Moderate (A)     Specific [place], UA 1 025    Narrative:       CLINITEK RESULT    CBC and differential [[id number]]  (Abnormal) Collected:  12/24/17 0531    Lab Status:  Final result Specimen:  Blood from Arm, Right Updated:  12/24/17 0538     WBC 7 10 Thousand/uL      RBC 4 62 Million/uL      Hemoglobin 15 6 g/dL      Hematocrit 44 9 %      MCV 97 fL      MCH 33 8 pg      MCHC 34 7 g/dL      RDW 13 4 %      MPV 9 8 fL      Platelets 723 Thousands/uL      nRBC 0 /100 WBCs      Neutrophils Relative 65 %      Lymphocytes Relative 18 %      Monocytes Relative 13 (H) %      Eosinophils Relative 3 %      Basophils Relative 1 %      Neutrophils Absolute 4 66 Thousands/µL      Lymphocytes Absolute 1 28 Thousands/µL Monocytes Absolute 0 90 Thousand/µL      Eosinophils Absolute 0 20 Thousand/µL      Basophils Absolute 0 05 Thousands/µL                  CT abdomen pelvis with contrast   Final Result by Aida Mora DO (12/24 3965)   Moderately obstructing 7 x 9 mm proximal right ureteric calculus, at the level of the L3-L4 interspace  Workstation performed: [id number]               Procedures  ECG 12 Lead Documentation  Date/Time: 12/24/2017 6:05 AM  Performed by: Cinda Becerra  Authorized by: Laura Kilpatrick     Indications / Diagnosis:  Flank pain  ECG reviewed by me, the ED Provider: yes    Patient location:  ED  Previous ECG:     Previous ECG:  Compared to current    Similarity:  No change  Interpretation:     Interpretation: normal    Rate:     ECG rate:  71    ECG rate assessment: normal    Rhythm:     Rhythm: sinus rhythm    Ectopy:     Ectopy: none    QRS:     QRS axis:  Normal  Conduction:     Conduction: normal    ST segments:     ST segments:  Normal  T waves:     T waves: normal            Phone Consults  ED Phone Contact    ED Course  ED Course       results of the scan showed a 7 x 9 mm stone  Case was discussed with on-call urology  Likely patient will be admitted to the hospital                     Initial Sepsis Screening     [address] Name 12/24/17 6931                Is the patient's history suggestive of a new or worsening infection? No  -EP        Suspected source of infection          Are two or more of the following signs & symptoms of infection both present and new to the patient?           Indicate SIRS criteria          If the answer is yes to both questions, suspicion of sepsis is present          If severe sepsis is present AND tissue hypoperfusion perists in the hour after fluid resuscitation or lactate > 4, the patient meets criteria for SEPTIC SHOCK          Are any of the following organ dysfunction criteria present within 6 hours of suspected infection and SIRS criteria that are NOT considered to be chronic conditions?         Organ dysfunction          Date of presentation of severe sepsis          Time of presentation of severe sepsis          Tissue hypoperfusion persists in the hour after crystalloid fluid administration, evidenced, by either:          Was hypotension present within one hour of the conclusion of crystalloid fluid administration?         Date of presentation of septic shock          Time of presentation of septic shock            User Key  (r) = Recorded By, (t) = Taken By, (c) = Cosigned By    [address] Name Provider Janell Kim MD Physician                  MDM  Number of Diagnoses or Management Options  Kidney stone:   Right flank pain:   Diagnosis management comments: 14-year-old male with right flank pain  Will get a CT his abdomen, will get blood work  Disposition will be pending results workup  CritCare Time    Disposition  Final diagnoses:   Right flank pain   Kidney stone     Time reflects when diagnosis was documented in both MDM as applicable and the Disposition within this note     Time User Action Codes Description Comment    12/24/2017  6:02 AM Juarez Martinez Add [R10 9] Right flank pain     12/24/2017  9:49 AM Rupal Stubbs Add [N20 0] Kidney stone       ED Disposition     ED Disposition Condition Comment    Admit  Case was discussed with jimena and the patient's admission status was agreed to be Admission Status: inpatient status to the service of Dr Chase Wolff           Follow-up Information    None       Current Discharge Medication List      CONTINUE these medications which have NOT CHANGED    Details   amLODIPine (NORVASC) 5 mg tablet Take 2 5 mg by mouth daily      aspirin 325 mg tablet Take 325 mg by mouth daily      b complex vitamins capsule Take 1 capsule by mouth daily      CALCIUM CARBONATE-VITAMIN D PO Take 1 tablet by mouth daily      carvedilol (COREG) 6 25 mg tablet Take 12 5 mg by mouth 2 (two) times a day with meals meclizine (ANTIVERT) 25 mg tablet Take 32 mg by mouth 3 (three) times a day as needed for dizziness Spouse states patient has been taking this medication for last 2 weeks      pantoprazole (PROTONIX) 40 mg tablet Take 40 mg by mouth daily      rosuvastatin (CRESTOR) 20 MG tablet Take 20 mg by mouth daily      TiZANidine (ZANAFLEX) 2 MG capsule Take 2 mg by mouth every 6 (six) hours      venlafaxine (EFFEXOR-XR) 150 mg 24 hr capsule Take 225 mg by mouth daily           No discharge procedures on file  ED Provider  Attending physically available and evaluated [facility]  I managed the patient along with the ED Attending      Electronically Signed by         Vida Craft MD  Resident  12/25/17 7692

## 2017-12-24 NOTE — H&P
History and Physical - [facility] Internal Medicine    Patient Information: Osiris Chen 68 y o  male MRN: [id number]  Unit/Bed#: [facility] 807-01 Encounter: [id number]  Admitting Physician: Berenice Franco MD  PCP: Matt Cast MD  Date of Admission:  12/24/17    Assessment/Plan:    Hospital Problem List:     Principal Problem:    Nephrolithiasis  Active Problems:    Right flank pain    Hypertensive urgency      Plan for the Primary Problem(s):  Obstructing 7x9 mm proximal right ureteric calculus  Discussed with Urology and patient is for OR tomorrow  Can likely DC after procedure  Will give supportive care with IVF  Tylenol, oxycodone and dilaudid PRN for pain control  Cardiac diet with NPO from midnight  Right flank pain  2/2 above  Analgesia as above  Plan for Additional Problems:   Hypertensive urgency  BP is 178/90  Will continue home amlodipine, coreg  Will add PRN labetalol as well  Likely 2/2 pain at this point  CAD  C/w asa and statin  VTE Prophylaxis: Heparin  / sequential compression device   Code Status: Full code  POLST: There is no POLST form on file for this patient (pre-hospital)    Anticipated Length of Stay:  Patient will be admitted on an Inpatient basis with an anticipated length of stay of  Less than 2 midnights  Justification for Hospital Stay: obstructing kidney stone  Likely DC tomorrow following procedure  Total Time for Visit, including Counseling / Coordination of Care: 1 hour  Greater than 50% of this total time spent on direct patient counseling and coordination of care  Chief Complaint:     "My right side hurts"    History of Present Illness:    Osiris Chen is a 68 y o  male who presents with sudden onset right sided flank pain this morning  Pain is currently 5/10, but was "worse" and patient anticipates it "will get worse" after pain medication wears off  Patient is also complaining of some nausea   No fevers, chills and no burning on urination  He has a history of kidney stones and has had several procedures in the past      He does have hypertension and is on amlodipine and coreg at home  In addition he has a background of CAD s/p stents in the remote past      He does not have any CP or SOB  No other complaints other than right sided flank pain  Review of Systems:    Review of Systems   Constitutional: Negative for activity change, appetite change, chills, diaphoresis, fatigue, fever and unexpected weight change  HENT: Negative for congestion  Respiratory: Negative for apnea, cough, choking, chest tightness, shortness of breath, wheezing and stridor  Cardiovascular: Negative for chest pain, palpitations and leg swelling  Gastrointestinal: Positive for nausea  Negative for abdominal distention, abdominal pain, anal bleeding, blood in stool, constipation, diarrhea, rectal pain and vomiting  Genitourinary: Positive for flank pain  Negative for difficulty urinating, dysuria, enuresis, frequency, genital sores and hematuria  Musculoskeletal: Negative for arthralgias, back pain, gait problem, joint swelling, myalgias, neck pain and neck stiffness  Neurological: Negative for dizziness, tremors, seizures, syncope, facial asymmetry, speech difficulty, weakness, light-headedness, numbness and headaches  Psychiatric/Behavioral: Negative for agitation, behavioral problems and confusion  Past Medical and Surgical History:     Past Medical History:   Diagnosis Date    Cancer [facility])     Cardiac disease     Hyperlipidemia     Hypertension     Kidney stone     Prostate CA [facility])        History reviewed  No pertinent surgical history  Meds/Allergies:    Prior to Admission medications    Medication Sig Start Date End Date Taking?  Authorizing Provider   amLODIPine (NORVASC) 5 mg tablet Take 2 5 mg by mouth daily    Historical Provider, MD   aspirin 325 mg tablet Take 325 mg by mouth daily    Historical Provider, MD   b complex vitamins capsule Take 1 capsule by mouth daily    Historical Provider, MD   CALCIUM CARBONATE-VITAMIN D PO Take 1 tablet by mouth daily    Historical Provider, MD   carvedilol (COREG) 6 25 mg tablet Take 12 5 mg by mouth 2 (two) times a day with meals    Historical Provider, MD   meclizine (ANTIVERT) 25 mg tablet Take 32 mg by mouth 3 (three) times a day as needed for dizziness Spouse states patient has been taking this medication for last 2 weeks    Historical Provider, MD   pantoprazole (PROTONIX) 40 mg tablet Take 40 mg by mouth daily    Historical Provider, MD   rosuvastatin (CRESTOR) 20 MG tablet Take 20 mg by mouth daily    Historical Provider, MD   TiZANidine (ZANAFLEX) 2 MG capsule Take 2 mg by mouth every 6 (six) hours    Historical Provider, MD   venlafaxine (EFFEXOR-XR) 150 mg 24 hr capsule Take 225 mg by mouth daily    Historical Provider, MD LARA have reviewed home medications with patient personally  Allergies: Allergies   Allergen Reactions    Nystatin        Social History:     Marital Status: /Civil Union   Occupation: used to work with production  Patient Pre-hospital Living Situation: Lives with wife  Patient Pre-hospital Level of Mobility: fully mobile  Drives a car  Patient Pre-hospital Diet Restrictions: none  Substance Use History:   History   Alcohol Use    Yes     History   Smoking Status    Current Some Day Smoker    Types: Cigars   Smokeless Tobacco    Current User     History   Drug use: Unknown       Family History:    History reviewed  No pertinent family history      Physical Exam:     Vitals:   Blood Pressure: (!) 178/90 (12/24/17 1250)  Pulse: 94 (12/24/17 1250)  Temperature: 97 9 °F (36 6 °C) (12/24/17 1250)  Temp Source: Oral (12/24/17 1250)  Respirations: 16 (12/24/17 1250)  Height: 5' 6" (167 6 cm) (12/24/17 1250)  Weight - Scale: 88 5 kg (195 lb) (12/24/17 1250)  SpO2: 97 % (12/24/17 1250)    Physical Exam   Constitutional: He is oriented to person, place, and time  He appears well-developed  No distress  HENT:   Head: Normocephalic  Eyes: Right eye exhibits no discharge  Left eye exhibits no discharge  No scleral icterus  Neck: No JVD present  No tracheal deviation present  No thyromegaly present  Cardiovascular: Normal rate  Exam reveals no gallop and no friction rub  No murmur heard  Pulmonary/Chest: No respiratory distress  He has no wheezes  He has no rales  He exhibits no tenderness  Abdominal: He exhibits no distension and no mass  There is tenderness  There is no rebound and no guarding  Right CVA  Musculoskeletal: He exhibits no edema, tenderness or deformity  Lymphadenopathy:     He has no cervical adenopathy  Neurological: He is alert and oriented to person, place, and time  No cranial nerve deficit  Coordination normal    Skin: No rash noted  He is not diaphoretic  No erythema  No pallor  Psychiatric: He has a normal mood and affect  Additional Data:     Lab Results: I have personally reviewed pertinent reports  Results from last 7 days  Lab Units 12/24/17  0531   WBC Thousand/uL 7 10   HEMOGLOBIN g/dL 15 6   HEMATOCRIT % 44 9   PLATELETS Thousands/uL 196   NEUTROS PCT % 65   LYMPHS PCT % 18   MONOS PCT % 13*   EOS PCT % 3       Results from last 7 days  Lab Units 12/24/17  0531   SODIUM mmol/L 142   POTASSIUM mmol/L 3 9   CHLORIDE mmol/L 105   CO2 mmol/L 30   BUN mg/dL 18   CREATININE mg/dL 1 03   CALCIUM mg/dL 9 3   TOTAL PROTEIN g/dL 8 0   BILIRUBIN TOTAL mg/dL 0 52   ALK PHOS U/L 76   ALT U/L 33   AST U/L 23   GLUCOSE RANDOM mg/dL 161*           Imaging: I have personally reviewed pertinent reports  Ct Abdomen Pelvis With Contrast    Result Date: 12/24/2017  Narrative: CT ABDOMEN AND PELVIS WITH IV CONTRAST INDICATION:  Right flank pain COMPARISON:   Impression: Moderately obstructing 7 x 9 mm proximal right ureteric calculus, at the level of the L3-L4 interspace   Workstation performed: [id number]     Ct Abdomen Pelvis W Contrast    Result Date: 12/3/2017  Narrative: CT ABDOMEN AND PELVIS WITH IV CONTRAST INDICATION:  Abdominal pain COMPARISON: 6/30/2017  TECHNIQUE:     Impression: 1  2 mm calculus within the left ureterovesical junction resulting in mild hydroureteronephrosis  There is increased left perinephric stranding with somewhat asymmetric phase of enhancement of the left kidney compared to the right, suggestive of obstructive uropathy  2  Nonobstructing calculus within the mid to lower pole of the right kidney measuring 5 mm  There is mild prominence of the proximal right ureter but without evidence of ureteral calculus  Workstation performed: [id number]       EKG, Pathology, and Other Studies Reviewed on Admission:   · EKG: no EKG on chart  Not indicated  Allscripts / Epic Records Reviewed: Yes     ** Please Note: This note has been constructed using a voice recognition system   **

## 2017-12-24 NOTE — ED ATTENDING ATTESTATION
Collette Ripple, MD, saw and evaluated the patient  I have discussed the patient with the resident/non-physician practitioner and agree with the resident's/non-physician practitioner's findings, Plan of Care, and MDM as documented in the resident's/non-physician practitioner's note, except where noted  All available labs and Radiology studies were reviewed  At this point I agree with the current assessment done in the Emergency Department  I have conducted an independent evaluation of this patient including a focused history of:    Emergency Department Note- Maikel Cortes 68 y o  male MRN: [id number]    Unit/Bed#: [facility] 245-33 Encounter: [id number]    Maikel Cortes is a 68 y o  male who presents with   Chief Complaint   Patient presents with    Flank Pain     pt c/o right flank pain and 3 episodes of bowel movements this morning  History of Present Illness   HPI:  Maikel Cortes is a 68 y o  male who presents for evaluation of:  Right flank pain that started this am  Patient has had some nausea without vomiting  The pain is of moderate severity and is reminiscent of when he had ureteral colic several months ago  Movement does not exacerbate his discomfort  The pain is poorly characterized but feels like an ache in his right lower flank area and right lower back  The patient denies any associated hematuria, dysuria, and fevers    Review of Systems   Constitutional: Negative for fatigue and fever  Gastrointestinal: Positive for abdominal pain and nausea  Negative for vomiting  Genitourinary: Positive for flank pain  Negative for hematuria  Musculoskeletal: Positive for back pain  Negative for neck pain  All other systems reviewed and are negative  Historical Information   Past Medical History:   Diagnosis Date    Cancer [facility])     Cardiac disease     Hyperlipidemia     Hypertension     Kidney stone     Prostate CA [facility])      History reviewed   No pertinent surgical history  Social History   History   Alcohol Use    Yes     History   Drug use: Unknown     History   Smoking Status    Current Some Day Smoker    Types: Cigars   Smokeless Tobacco    Current User     Family History: non-contributory    Meds/Allergies   all medications and allergies reviewed  Allergies   Allergen Reactions    Nystatin        Objective   First Vitals:   Blood Pressure: (!) 175/93 (12/24/17 0511)  Pulse: 86 (12/24/17 0511)  Temperature: 98 4 °F (36 9 °C) (12/24/17 0511)  Temp Source: Tympanic (12/24/17 0511)  Respirations: 18 (12/24/17 0511)  Height: 5' 6" (167 6 cm) (12/24/17 0511)  Weight - Scale: 90 7 kg (200 lb) (12/24/17 0511)  SpO2: 98 % (12/24/17 0511)    Current Vitals:   Blood Pressure: (!) 178/90 (12/24/17 1250)  Pulse: 94 (12/24/17 1250)  Temperature: 97 9 °F (36 6 °C) (12/24/17 1250)  Temp Source: Oral (12/24/17 1250)  Respirations: 16 (12/24/17 1250)  Height: 5' 6" (167 6 cm) (12/24/17 1250)  Weight - Scale: 88 5 kg (195 lb) (12/24/17 1250)  SpO2: 97 % (12/24/17 1250)      Intake/Output Summary (Last 24 hours) at 12/24/17 1418  Last data filed at 12/24/17 0705   Gross per 24 hour   Intake             2000 ml   Output                0 ml   Net             2000 ml       Invasive Devices     Peripheral Intravenous Line            Peripheral IV 12/24/17 Right Antecubital less than 1 day                Physical Exam   Constitutional: He is oriented to person, place, and time  He appears well-developed and well-nourished  HENT:   Head: Normocephalic and atraumatic  Eyes: Conjunctivae are normal  Pupils are equal, round, and reactive to light  Pulmonary/Chest: Effort normal and breath sounds normal    Abdominal: Soft  Bowel sounds are normal  There is no tenderness  There is no rebound  Musculoskeletal: Normal range of motion  He exhibits no deformity  Neurological: He is alert and oriented to person, place, and time  Skin: Skin is warm and dry     Psychiatric: He has a normal mood and affect  His behavior is normal  Judgment and thought content normal    Nursing note and vitals reviewed  Medical Decision Makin  Acute right flank pain:  Plan to obtain a CT scan of the abdomen and pelvis to rule out recurrent ureteral colic, appendicitis, and right-sided diverticulitis  Plan to obtain a CBC to rule out leukocytosis      Recent Results (from the past 36 hour(s))   CBC and differential    Collection Time: 17  5:31 AM   Result Value Ref Range    WBC 7 10 4 31 - 10 16 Thousand/uL    RBC 4 62 3 88 - 5 62 Million/uL    Hemoglobin 15 6 12 0 - 17 0 g/dL    Hematocrit 44 9 36 5 - 49 3 %    MCV 97 82 - 98 fL    MCH 33 8 26 8 - 34 3 pg    MCHC 34 7 31 4 - 37 4 g/dL    RDW 13 4 11 6 - 15 1 %    MPV 9 8 8 9 - 12 7 fL    Platelets 102 088 - 244 Thousands/uL    nRBC 0 /100 WBCs    Neutrophils Relative 65 43 - 75 %    Lymphocytes Relative 18 14 - 44 %    Monocytes Relative 13 (H) 4 - 12 %    Eosinophils Relative 3 0 - 6 %    Basophils Relative 1 0 - 1 %    Neutrophils Absolute 4 66 1 85 - 7 62 Thousands/µL    Lymphocytes Absolute 1 28 0 60 - 4 47 Thousands/µL    Monocytes Absolute 0 90 0 17 - 1 22 Thousand/µL    Eosinophils Absolute 0 20 0 00 - 0 61 Thousand/µL    Basophils Absolute 0 05 0 00 - 0 10 Thousands/µL   Comprehensive metabolic panel    Collection Time: 17  5:31 AM   Result Value Ref Range    Sodium 142 136 - 145 mmol/L    Potassium 3 9 3 5 - 5 3 mmol/L    Chloride 105 100 - 108 mmol/L    CO2 30 21 - 32 mmol/L    Anion Gap 7 4 - 13 mmol/L    BUN 18 5 - 25 mg/dL    Creatinine 1 03 0 60 - 1 30 mg/dL    Glucose 161 (H) 65 - 140 mg/dL    Calcium 9 3 8 3 - 10 1 mg/dL    AST 23 5 - 45 U/L    ALT 33 12 - 78 U/L    Alkaline Phosphatase 76 46 - 116 U/L    Total Protein 8 0 6 4 - 8 2 g/dL    Albumin 3 8 3 5 - 5 0 g/dL    Total Bilirubin 0 52 0 20 - 1 00 mg/dL    eGFR 70 ml/min/1 73sq m   ECG 12 lead    Collection Time: 17  5:54 AM   Result Value Ref Range    Ventricular Rate 71 BPM    Atrial Rate 72 BPM    NE Interval 200 ms    QRSD Interval 112 ms    QT Interval 452 ms    QTC Interval 491 ms    P [place] 64 degrees    QRS Axis -24 degrees    T Wave Axis 26 degrees   ED Urine Macroscopic    Collection Time: 12/24/17  6:03 AM   Result Value Ref Range    Color, UA Yellow     Clarity, UA Clear     pH, UA 6 5 4 5 - 8 0    Leukocytes, UA Negative Negative    Nitrite, UA Negative Negative    Protein, UA 30 (1+) (A) Negative mg/dl    Glucose, UA Negative Negative mg/dl    Ketones, UA Negative Negative mg/dl    Urobilinogen, UA 0 2 0 2, 1 0 E U /dl E U /dl    Bilirubin, UA Negative Negative    Blood, UA Moderate (A) Negative    Specific Gravity, UA 1 025 1 003 - 1 030   Urine Microscopic    Collection Time: 12/24/17  6:03 AM   Result Value Ref Range    RBC, UA Innumerable (A) None Seen, 0-5 /hpf    WBC, UA None Seen None Seen, 0-5, 5-55, 5-65 /hpf    Epithelial Cells None Seen None Seen, Occasional /hpf    Bacteria, UA None Seen None Seen, Occasional /hpf    Hyaline Casts, UA None Seen None Seen /lpf   POCT urinalysis dipstick    Collection Time: 12/24/17  6:04 AM   Result Value Ref Range    Color, UA -    POCT troponin    Collection Time: 12/24/17  6:08 AM   Result Value Ref Range    POC Troponin I 0 00 0 00 - 0 08 ng/ml    Specimen Type VENOUS      CT abdomen pelvis with contrast   Final Result   Moderately obstructing 7 x 9 mm proximal right ureteric calculus, at the level of the L3-L4 interspace  Workstation performed: [id number]               Portions of the record may have been created with voice recognition software  Occasional wrong word or "sound a like" substitutions may have occurred due to the inherent limitations of voice recognition software  Read the chart carefully and recognize, using context, where substitutions have occurred

## 2017-12-24 NOTE — CONSULTS
UROLOGY CONSULTATION NOTE     Patient Identifiers: Elo Kern (MRN [id number])  Service Requesting Consultation: ER  Service Providing Consultation:  Urology, Alcon Terry MD  Date of Service: 12/24/2017  Reason for Consultation: Kidney stone      ASSESSMENT:     68 y o  old male with  a history of recurrent nephrolithiasis who presents acutely with a 9 mm right proximal ureteral calculus, hydronephrosis, and a delayed nephrogram   Currently his pain is very well controlled  He has no signs of systemic infection  Discussed options for management of the patient's ureteral stone  We discussed surgical options including ureteroscopy and shock wave lithotripsy  In addition we discussed conservative management with medical expulsive therapy  The patient has elected to undergo ureteroscopy  I discussed with the patients risks and benefits and alternatives to ureteroscopy with laser lithotripsy  The risks include bleeding, infection, injury to the urethra, bladder, ureter or kidney, risk of a staged procedure, risk of stricture, risk of residual fragments, risk of loss of kidney, risks of anesthesia including DVT, PE, MI and death  The patient understands that a ureteral stent will likely be left in place at the time of the procedure  We reviewed the expected postoperative care  The patient understands these risks and wishes to proceed with ureteroscopy  PLAN:     - surgery has been scheduled for tomorrow morning at 8:00 a m   - informed consent was obtained for right ureteroscopy  - given the patient's age and medical comorbidities I have recommended hospital admission in the perioperative period for close monitoring and pain control    [address] Internal Medicine consultation for this  - I anticipate he will be stable for discharge home tomorrow following surgery        History of Present Illness:     Elo Kern is a 68 y o  old with a history of recurrent nephrolithiasis having undergone shockwave lithotripsy with Dr Meghan Regalado in the past   Recent imaging revealed a nonobstructing right intrarenal stone from which was asymptomatic at that time  He developed acute onset right flank pain and abdominal pain at 4:00 a m  this morning prompting ER presentation  CT scan revealed a 9 mm obstructing stone in the right proximal ureter with hydronephrosis and delayed nephrogram   Patient denies any fevers or chills  He has no leukocytosis or nitrite positivity on his lab analysis    The time of my evaluation he is very comfortable  He states he is hungry    Past Medical, Past Surgical History:     Past Medical History:   Diagnosis Date    Cancer [facility])     Cardiac disease     Hyperlipidemia     Hypertension     Kidney stone     Prostate CA [facility])    :    History reviewed  No pertinent surgical history :    Medications, Allergies:   No current facility-administered medications for this encounter       Current Outpatient Prescriptions:     amLODIPine (NORVASC) 5 mg tablet, Take 2 5 mg by mouth daily, Disp: , Rfl:     aspirin 325 mg tablet, Take 325 mg by mouth daily, Disp: , Rfl:     b complex vitamins capsule, Take 1 capsule by mouth daily, Disp: , Rfl:     CALCIUM CARBONATE-VITAMIN D PO, Take 1 tablet by mouth daily, Disp: , Rfl:     carvedilol (COREG) 6 25 mg tablet, Take 12 5 mg by mouth 2 (two) times a day with meals, Disp: , Rfl:     meclizine (ANTIVERT) 25 mg tablet, Take 32 mg by mouth 3 (three) times a day as needed for dizziness Spouse states patient has been taking this medication for last 2 weeks, Disp: , Rfl:     pantoprazole (PROTONIX) 40 mg tablet, Take 40 mg by mouth daily, Disp: , Rfl:     rosuvastatin (CRESTOR) 20 MG tablet, Take 20 mg by mouth daily, Disp: , Rfl:     TiZANidine (ZANAFLEX) 2 MG capsule, Take 2 mg by mouth every 6 (six) hours, Disp: , Rfl:     venlafaxine (EFFEXOR-XR) 150 mg 24 hr capsule, Take 225 mg by mouth daily, Disp: , Rfl: Allergies: Allergies   Allergen Reactions    Nystatin    :    Social and Family History:   Social History:   Social History   Substance Use Topics    Smoking status: Current Some Day Smoker     Types: Cigars    Smokeless tobacco: Current User    Alcohol use Yes     History   Smoking Status    Current Some Day Smoker    Types: Cigars   Smokeless Tobacco    Current User       Family History:  History reviewed  No pertinent family history :     Review of Systems:     General: Fever, chills, or night sweats: negative  Cardiac: Negative for chest pain  Pulmonary: Negative for shortness of breath  Gastrointestinal: Abdominal pain positive  Nausea, vomiting, or diarrhea negative,  Genitourinary: See HPI above  Patient does not have hematuria  All other systems queried were negative  Physical Exam:   General: Patient is pleasant and in NAD  Awake and alert  BP (!) 172/99   Pulse 82   Temp 98 4 °F (36 9 °C) (Tympanic)   Resp (!) 11   Ht 5' 6" (1 676 m)   Wt 90 7 kg (200 lb)   SpO2 97%   BMI 32 28 kg/m²   Cardiac: Peripheral edema: negative  Pulmonary: Non-labored breathing  Abdomen: Soft, non-tender, non-distended  No surgical scars  No masses, tenderness, hernias noted  Genitourinary: Positive CVA tenderness, positive suprapubic tenderness  Labs:     Lab Results   Component Value Date    HGB 15 6 12/24/2017    HCT 44 9 12/24/2017    WBC 7 10 12/24/2017     12/24/2017   ]    Lab Results   Component Value Date     12/24/2017    K 3 9 12/24/2017     12/24/2017    CO2 30 12/24/2017    BUN 18 12/24/2017    CREATININE 1 03 12/24/2017    CALCIUM 9 3 12/24/2017    GLUCOSE 161 (H) 12/24/2017   ]    Imaging:   I personally reviewed the images and report of the following studies, and reviewed them with the patient:    FINDINGS:     ABDOMEN     LOWER CHEST:  Emphysematous changes are present in the lower lobes of the lungs bilaterally  The heart is enlarged    Coronary artery calcifications      LIVER/BILIARY TREE:  Fatty infiltrative changes in the liver      GALLBLADDER:  Surgically absent      SPLEEN:  Unremarkable      PANCREAS:  Atrophic      ADRENAL GLANDS:  Unremarkable      KIDNEYS/URETERS:    RIGHT KIDNEY:  There has been migration of the previously identified 7 x 9 mm calculus into the proximal ureter (series 2, image 45 and series 601, image 87)  This is located at the level of the L3-L4 interspace  There is moderate perinephric and periureteric inflammation up to the level of the calculus  No perinephric collections are present to suggest an abscess  There is delayed enhancement of the right kidney  No additional calculi are seen within the right collecting system      LEFT KIDNEY:  Simple cortical cyst midpole  No hydronephrosis or nephrolithiasis        STOMACH AND BOWEL:  No obstruction  Hiatal hernia  Scattered diverticula in the sigmoid colon  Nothing to suggest acute diverticulitis      APPENDIX:  No findings to suggest appendicitis      ABDOMINOPELVIC CAVITY:  No ascites or free intraperitoneal air  No lymphadenopathy      VESSELS:  Unremarkable for patient's age      PELVIS     REPRODUCTIVE ORGANS:  Prostatectomy      URINARY BLADDER:  Unremarkable      ABDOMINAL WALL/INGUINAL REGIONS:  Unremarkable      OSSEOUS STRUCTURES: Degenerative changes lumbar spine  No acute fracture or destructive osseous lesion      IMPRESSION:  Moderately obstructing 7 x 9 mm proximal right ureteric calculus, at the level of the L3-L4 interspace  Thank you for allowing me to participate in this patients care  Please do not hesitate to call with any additional questions    Leonora Bautista MD

## 2017-12-24 NOTE — PLAN OF CARE
Problem: Potential for Falls  Goal: Patient will remain free of falls  INTERVENTIONS:  - Assess patient frequently for physical needs  -  Identify cognitive and physical deficits and behaviors that affect risk of falls    -  [place] fall precautions as indicated by assessment   - Educate patient/family on patient safety including physical limitations  - Instruct patient to call for assistance with activity based on assessment  - Modify environment to reduce risk of injury  - Consider OT/PT consult to assist with strengthening/mobility   Outcome: Progressing

## 2017-12-25 ENCOUNTER — ANESTHESIA EVENT (INPATIENT)
Dept: PERIOP | Facility: HOSPITAL | Age: 77
DRG: 694 | End: 2017-12-25
Payer: MEDICARE

## 2017-12-25 ENCOUNTER — APPOINTMENT (INPATIENT)
Dept: RADIOLOGY | Facility: HOSPITAL | Age: 77
DRG: 694 | End: 2017-12-25
Payer: MEDICARE

## 2017-12-25 ENCOUNTER — ANESTHESIA (INPATIENT)
Dept: PERIOP | Facility: HOSPITAL | Age: 77
DRG: 694 | End: 2017-12-25
Payer: MEDICARE

## 2017-12-25 VITALS
RESPIRATION RATE: 18 BRPM | WEIGHT: 195 LBS | HEIGHT: 66 IN | BODY MASS INDEX: 31.34 KG/M2 | SYSTOLIC BLOOD PRESSURE: 154 MMHG | HEART RATE: 115 BPM | DIASTOLIC BLOOD PRESSURE: 93 MMHG | TEMPERATURE: 98 F | OXYGEN SATURATION: 99 %

## 2017-12-25 PROBLEM — C61 PROSTATE CANCER (HCC): Status: ACTIVE | Noted: 2017-12-25

## 2017-12-25 PROBLEM — I16.0 HYPERTENSIVE URGENCY: Status: RESOLVED | Noted: 2017-12-24 | Resolved: 2017-12-25

## 2017-12-25 PROBLEM — I25.10 CAD (CORONARY ARTERY DISEASE): Status: ACTIVE | Noted: 2017-12-25

## 2017-12-25 PROBLEM — I10 HTN (HYPERTENSION): Status: ACTIVE | Noted: 2017-12-25

## 2017-12-25 LAB
ALBUMIN SERPL BCP-MCNC: 3.1 G/DL (ref 3.5–5)
ALP SERPL-CCNC: 61 U/L (ref 46–116)
ALT SERPL W P-5'-P-CCNC: 23 U/L (ref 12–78)
ANION GAP SERPL CALCULATED.3IONS-SCNC: 6 MMOL/L (ref 4–13)
AST SERPL W P-5'-P-CCNC: 19 U/L (ref 5–45)
BILIRUB SERPL-MCNC: 0.62 MG/DL (ref 0.2–1)
BUN SERPL-MCNC: 19 MG/DL (ref 5–25)
CALCIUM SERPL-MCNC: 8.3 MG/DL (ref 8.3–10.1)
CHLORIDE SERPL-SCNC: 107 MMOL/L (ref 100–108)
CO2 SERPL-SCNC: 27 MMOL/L (ref 21–32)
CREAT SERPL-MCNC: 1.18 MG/DL (ref 0.6–1.3)
ERYTHROCYTE [DISTWIDTH] IN BLOOD BY AUTOMATED COUNT: 13.5 % (ref 11.6–15.1)
GFR SERPL CREATININE-BSD FRML MDRD: 59 ML/MIN/1.73SQ M
GLUCOSE SERPL-MCNC: 114 MG/DL (ref 65–140)
HCT VFR BLD AUTO: 38.3 % (ref 36.5–49.3)
HGB BLD-MCNC: 13 G/DL (ref 12–17)
MAGNESIUM SERPL-MCNC: 2.2 MG/DL (ref 1.6–2.6)
MCH RBC QN AUTO: 33.2 PG (ref 26.8–34.3)
MCHC RBC AUTO-ENTMCNC: 33.9 G/DL (ref 31.4–37.4)
MCV RBC AUTO: 98 FL (ref 82–98)
PLATELET # BLD AUTO: 152 THOUSANDS/UL (ref 149–390)
PMV BLD AUTO: 10.1 FL (ref 8.9–12.7)
POTASSIUM SERPL-SCNC: 4.1 MMOL/L (ref 3.5–5.3)
PROT SERPL-MCNC: 6.1 G/DL (ref 6.4–8.2)
RBC # BLD AUTO: 3.91 MILLION/UL (ref 3.88–5.62)
SODIUM SERPL-SCNC: 140 MMOL/L (ref 136–145)
WBC # BLD AUTO: 6.41 THOUSAND/UL (ref 4.31–10.16)

## 2017-12-25 PROCEDURE — 80053 COMPREHEN METABOLIC PANEL: CPT | Performed by: INTERNAL MEDICINE

## 2017-12-25 PROCEDURE — BT1D1ZZ FLUOROSCOPY OF RIGHT KIDNEY, URETER AND BLADDER USING LOW OSMOLAR CONTRAST: ICD-10-PCS | Performed by: UROLOGY

## 2017-12-25 PROCEDURE — 0T768DZ DILATION OF RIGHT URETER WITH INTRALUMINAL DEVICE, VIA NATURAL OR ARTIFICIAL OPENING ENDOSCOPIC: ICD-10-PCS | Performed by: UROLOGY

## 2017-12-25 PROCEDURE — 74420 UROGRAPHY RTRGR +-KUB: CPT

## 2017-12-25 PROCEDURE — 85027 COMPLETE CBC AUTOMATED: CPT | Performed by: INTERNAL MEDICINE

## 2017-12-25 PROCEDURE — 0TF68ZZ FRAGMENTATION IN RIGHT URETER, VIA NATURAL OR ARTIFICIAL OPENING ENDOSCOPIC: ICD-10-PCS | Performed by: UROLOGY

## 2017-12-25 PROCEDURE — C1769 GUIDE WIRE: HCPCS | Performed by: UROLOGY

## 2017-12-25 PROCEDURE — 83735 ASSAY OF MAGNESIUM: CPT | Performed by: INTERNAL MEDICINE

## 2017-12-25 PROCEDURE — C2617 STENT, NON-COR, TEM W/O DEL: HCPCS | Performed by: UROLOGY

## 2017-12-25 PROCEDURE — C1758 CATHETER, URETERAL: HCPCS | Performed by: UROLOGY

## 2017-12-25 DEVICE — STENT URETERAL 6FR 22CM INLAY OPTIMA W/NITINOL GDWR
Type: IMPLANTABLE DEVICE | Site: URETER | Status: NON-FUNCTIONAL
Removed: 2018-01-19

## 2017-12-25 RX ORDER — PHENAZOPYRIDINE HYDROCHLORIDE 200 MG/1
200 TABLET, FILM COATED ORAL 3 TIMES DAILY PRN
Qty: 10 TABLET | Refills: 0 | Status: SHIPPED | OUTPATIENT
Start: 2017-12-25 | End: 2017-12-28

## 2017-12-25 RX ORDER — MIDAZOLAM HYDROCHLORIDE 1 MG/ML
INJECTION INTRAMUSCULAR; INTRAVENOUS AS NEEDED
Status: DISCONTINUED | OUTPATIENT
Start: 2017-12-25 | End: 2017-12-25 | Stop reason: SURG

## 2017-12-25 RX ORDER — LIDOCAINE HYDROCHLORIDE 10 MG/ML
INJECTION, SOLUTION INFILTRATION; PERINEURAL AS NEEDED
Status: DISCONTINUED | OUTPATIENT
Start: 2017-12-25 | End: 2017-12-25 | Stop reason: SURG

## 2017-12-25 RX ORDER — MAGNESIUM HYDROXIDE 1200 MG/15ML
LIQUID ORAL AS NEEDED
Status: DISCONTINUED | OUTPATIENT
Start: 2017-12-25 | End: 2017-12-25 | Stop reason: HOSPADM

## 2017-12-25 RX ORDER — PROPOFOL 10 MG/ML
INJECTION, EMULSION INTRAVENOUS AS NEEDED
Status: DISCONTINUED | OUTPATIENT
Start: 2017-12-25 | End: 2017-12-25 | Stop reason: SURG

## 2017-12-25 RX ORDER — EPHEDRINE SULFATE 50 MG/ML
INJECTION, SOLUTION INTRAVENOUS AS NEEDED
Status: DISCONTINUED | OUTPATIENT
Start: 2017-12-25 | End: 2017-12-25 | Stop reason: SURG

## 2017-12-25 RX ORDER — DOCUSATE SODIUM 100 MG/1
100 CAPSULE, LIQUID FILLED ORAL 2 TIMES DAILY
Qty: 10 CAPSULE | Refills: 0 | Status: SHIPPED | OUTPATIENT
Start: 2017-12-25 | End: 2017-12-25

## 2017-12-25 RX ORDER — HYDROCODONE BITARTRATE AND ACETAMINOPHEN 5; 325 MG/1; MG/1
1 TABLET ORAL EVERY 6 HOURS PRN
Qty: 10 TABLET | Refills: 0 | Status: SHIPPED | OUTPATIENT
Start: 2017-12-25 | End: 2017-12-25

## 2017-12-25 RX ORDER — TAMSULOSIN HYDROCHLORIDE 0.4 MG/1
0.4 CAPSULE ORAL
Qty: 15 CAPSULE | Refills: 0 | Status: SHIPPED | OUTPATIENT
Start: 2017-12-25 | End: 2018-03-19

## 2017-12-25 RX ORDER — ONDANSETRON 2 MG/ML
INJECTION INTRAMUSCULAR; INTRAVENOUS AS NEEDED
Status: DISCONTINUED | OUTPATIENT
Start: 2017-12-25 | End: 2017-12-25 | Stop reason: SURG

## 2017-12-25 RX ORDER — FENTANYL CITRATE/PF 50 MCG/ML
25 SYRINGE (ML) INJECTION
Status: DISCONTINUED | OUTPATIENT
Start: 2017-12-25 | End: 2017-12-25 | Stop reason: HOSPADM

## 2017-12-25 RX ORDER — CEFAZOLIN SODIUM 1 G/3ML
INJECTION, POWDER, FOR SOLUTION INTRAMUSCULAR; INTRAVENOUS AS NEEDED
Status: DISCONTINUED | OUTPATIENT
Start: 2017-12-25 | End: 2017-12-25 | Stop reason: SURG

## 2017-12-25 RX ORDER — FENTANYL CITRATE 50 UG/ML
INJECTION, SOLUTION INTRAMUSCULAR; INTRAVENOUS AS NEEDED
Status: DISCONTINUED | OUTPATIENT
Start: 2017-12-25 | End: 2017-12-25 | Stop reason: SURG

## 2017-12-25 RX ORDER — DOCUSATE SODIUM 100 MG/1
100 CAPSULE, LIQUID FILLED ORAL 2 TIMES DAILY
Qty: 10 CAPSULE | Refills: 0 | Status: ON HOLD
Start: 2017-12-25 | End: 2018-01-19 | Stop reason: ALTCHOICE

## 2017-12-25 RX ORDER — ONDANSETRON 2 MG/ML
4 INJECTION INTRAMUSCULAR; INTRAVENOUS ONCE AS NEEDED
Status: DISCONTINUED | OUTPATIENT
Start: 2017-12-25 | End: 2017-12-25 | Stop reason: HOSPADM

## 2017-12-25 RX ORDER — PHENAZOPYRIDINE HYDROCHLORIDE 200 MG/1
200 TABLET, FILM COATED ORAL 3 TIMES DAILY PRN
Qty: 10 TABLET | Refills: 0 | Status: SHIPPED | OUTPATIENT
Start: 2017-12-25 | End: 2017-12-25

## 2017-12-25 RX ORDER — HYDROCODONE BITARTRATE AND ACETAMINOPHEN 5; 325 MG/1; MG/1
1 TABLET ORAL EVERY 6 HOURS PRN
Qty: 10 TABLET | Refills: 0 | Status: SHIPPED | OUTPATIENT
Start: 2017-12-25 | End: 2018-01-04

## 2017-12-25 RX ADMIN — FENTANYL CITRATE 25 MCG: 50 INJECTION, SOLUTION INTRAMUSCULAR; INTRAVENOUS at 08:25

## 2017-12-25 RX ADMIN — AMLODIPINE BESYLATE 2.5 MG: 2.5 TABLET ORAL at 09:31

## 2017-12-25 RX ADMIN — TIZANIDINE 2 MG: 2 TABLET ORAL at 11:03

## 2017-12-25 RX ADMIN — LIDOCAINE HYDROCHLORIDE 50 MG: 10 INJECTION, SOLUTION INFILTRATION; PERINEURAL at 07:51

## 2017-12-25 RX ADMIN — FENTANYL CITRATE 25 MCG: 50 INJECTION, SOLUTION INTRAMUSCULAR; INTRAVENOUS at 08:12

## 2017-12-25 RX ADMIN — EPHEDRINE SULFATE 10 MG: 50 INJECTION, SOLUTION INTRAMUSCULAR; INTRAVENOUS; SUBCUTANEOUS at 08:04

## 2017-12-25 RX ADMIN — DEXAMETHASONE SODIUM PHOSPHATE 10 MG: 10 INJECTION INTRAMUSCULAR; INTRAVENOUS at 07:59

## 2017-12-25 RX ADMIN — EPHEDRINE SULFATE 5 MG: 50 INJECTION, SOLUTION INTRAMUSCULAR; INTRAVENOUS; SUBCUTANEOUS at 08:06

## 2017-12-25 RX ADMIN — PROPOFOL 200 MG: 10 INJECTION, EMULSION INTRAVENOUS at 07:51

## 2017-12-25 RX ADMIN — FENTANYL CITRATE 25 MCG: 50 INJECTION, SOLUTION INTRAMUSCULAR; INTRAVENOUS at 07:59

## 2017-12-25 RX ADMIN — FENTANYL CITRATE 25 MCG: 50 INJECTION, SOLUTION INTRAMUSCULAR; INTRAVENOUS at 08:33

## 2017-12-25 RX ADMIN — PANTOPRAZOLE SODIUM 40 MG: 40 TABLET, DELAYED RELEASE ORAL at 09:31

## 2017-12-25 RX ADMIN — ASPIRIN 325 MG: 325 TABLET ORAL at 09:31

## 2017-12-25 RX ADMIN — MIDAZOLAM HYDROCHLORIDE 1 MG: 1 INJECTION, SOLUTION INTRAMUSCULAR; INTRAVENOUS at 08:00

## 2017-12-25 RX ADMIN — OXYCODONE HYDROCHLORIDE 5 MG: 5 TABLET ORAL at 09:31

## 2017-12-25 RX ADMIN — ZINC 1 TABLET: TAB ORAL at 09:31

## 2017-12-25 RX ADMIN — HEPARIN SODIUM 5000 UNITS: 5000 INJECTION, SOLUTION INTRAVENOUS; SUBCUTANEOUS at 05:17

## 2017-12-25 RX ADMIN — CEFAZOLIN 2000 MG: 1 INJECTION, POWDER, FOR SOLUTION INTRAVENOUS at 07:50

## 2017-12-25 RX ADMIN — ONDANSETRON 4 MG: 2 INJECTION INTRAMUSCULAR; INTRAVENOUS at 08:30

## 2017-12-25 RX ADMIN — SODIUM CHLORIDE 125 ML/HR: 0.9 INJECTION, SOLUTION INTRAVENOUS at 09:33

## 2017-12-25 RX ADMIN — CARVEDILOL 12.5 MG: 12.5 TABLET, FILM COATED ORAL at 09:31

## 2017-12-25 RX ADMIN — MIDAZOLAM HYDROCHLORIDE 1 MG: 1 INJECTION, SOLUTION INTRAMUSCULAR; INTRAVENOUS at 07:43

## 2017-12-25 NOTE — DISCHARGE INSTRUCTIONS
You underwent cystoscopy (looking inside the bladder) and  ureteroscopy (looking inside the  ureter) with laser lithotripsy to break apart a kidney stone  General Instructions   1  Stent: You have a stent in your  ureter  This is tied to a string which hangs out of your urethra  Please call to have this removed in the office in 4-5 days  It is normal for the string to intermittently retract into your urethra  When this happens, you won't be able to see it temporarily  It should come back out the next time you void  This is not dangerous "     2  Bathing: You may shower and bathe as per usual    3  Diet: You may resume your pre-operative diet   4  Activity: Walk as much as possible  No strenuous exercise or work for the first 5 days  From then on, slowly increase your level of activity back to normal    5  If you currently smoke or use tobacco product, consider quitting  There are resources available to help you stop  Please ask your provider     6  Report fever 101 5 or higher, pain not controlled by medications, or anything you believe warrants medical attention

## 2017-12-25 NOTE — ANESTHESIA POSTPROCEDURE EVALUATION
Post-Op Assessment Note      CV Status:  Stable    Mental Status:  Somnolent    Hydration Status:  Euvolemic    PONV Controlled:  Controlled    Airway Patency:  Patent    Post Op Vitals Reviewed: Yes          Staff: CRNA           BP (P) 147/83 (12/25/17 0839)    Temp (P) 98 °F (36 7 °C) (12/25/17 0839)    Pulse (P) 89 (12/25/17 0839)   Resp (!) (P) 11 (12/25/17 0839)    SpO2   96

## 2017-12-25 NOTE — ANESTHESIA PREPROCEDURE EVALUATION
Review of Systems/Medical History  Patient summary reviewed  Chart reviewed  No history of anesthetic complications     Cardiovascular  Hyperlipidemia, Hypertension , CAD, , Cardiac stents     Pulmonary       GI/Hepatic  Negative GI/hepatic ROS          Kidney stones,        Endo/Other  Negative endo/other ROS      GYN       Hematology  Negative hematology ROS      Musculoskeletal  Obesity ,        Neurology  Negative neurology ROS      Psychology           Physical Exam    Airway    Mallampati score: II  TM Distance: >3 FB  Neck ROM: full     Dental   upper dentures and lower dentures,     Cardiovascular  Cardiovascular exam normal    Pulmonary  Pulmonary exam normal     Other Findings        Anesthesia Plan  ASA Score- 3     Anesthesia Type- general with ASA Monitors  Additional Monitors:   Airway Plan: ETT  Comment: Plan discussed  Consent obtained        Plan Factors-    Induction- intravenous and rapid sequence induction  Postoperative Plan-     Informed Consent- Anesthetic plan and risks discussed with patient

## 2017-12-25 NOTE — OP NOTE
Operative Note     PATIENT:  Rosa Urbano (MRN [id number])    DATE OF PROCEDURE:   12/25/2017    PRE-OP DIAGNOSIS:   1) Right ureteral calculus  2) prostate cancer status post pelvic radiation     POST-OP DIAGNOSIS:   1) Right ureteral calculus  2) prostate cancer status post pelvic radiation   3) bladder neck contracture    PROCEDURES PERFORMED:  1) Cystoscopy  2) Right retrograde pyelography with fluoroscopic interpretation  3) Right ureteroscopy with laser lithotripsy of stone  4) Right ureteral stent placement     SURGEON:  Grabiel Cleveland MD    NOTE:  There were no qualified teaching residents to assist with this case    ANESTHESIA: General     COMPLICATIONS:   None    ANTIBIOTICS:  Cefazolin    INTRAOPERATIVE THROMBOEMBOLISM PROPHYLAXIS:  Pneumatic compression stockings     FINDINGS:  A number of post radiation changes were seen within the lower and upper urinary tract including a bladder neck contracture as well as stenosis of the distal ureter  Each of these complicated the procedure to a certain degree  I was able to visualize the stone within the proximal ureter using the semi-rigid ureteral scope  I carefully fragmented approximately half of its total volume  However at this point the stone retropulsed into the renal pelvis  Placed a 2nd working wire and attempted to place a flexible ureteral scope in order to engage the residual stone burden  However I was unable to advance the flexible ureteral scope in order to accomplish this  A postoperative stent was left in place without a string  Patient will require secondary procedure to extract the remainder of his stone burden    INDICATIONS FOR PROCEDURE:  Rosa Urbano is an 68 y o  old male with Right ureteral calculus  After discussing the options, the patient elected to undergo ureteroscopy and ureteral stent placement    We discussed the procedure in detail, the alternatives, and the risks, and they signed informed consent to proceed  PROCEDURE IN DETAIL:   The patient was identified and brought to the OR  Antibiotic prophylaxis and DVT prophylaxis were administered  They were placed in the comfortable dorsal lithotomy position with care to pad all pressure points  They were prepped and draped in the usual sterile fashion using hibiclens  A surgical time out was performed with all in the room in agreement with the correct patient, procedure, indications, and laterality  A 21-[place] rigid cystoscope was used to enter the bladder  The bladder was inspected in its entirety and there were no lesions noted  A bladder neck contracture was noted though I was able to gently advance the cystoscope through this without the need for any formal incision and dilation  The ureteral orifice were visualized  normal orthotopic positions  The Right ureteral orifice was identified and a 5 Fr open ended catheter was placed into the ureteral orifice  A retrograde pyelogram was performed with the findings as described above  A Sensor wire was advanced up to the kidney under fluoroscopic guidance  Leaving this safety wire in place, the bladder was drained  A "7 5 Fr semi-rigid ureteroscope was advanced up the ureter under vision  The stone was encountered in the proximal ureter  The stone was not noted to be impacted  A holmium laser fiber was passed through the ureteroscope and laser lithotripsy was commenced at settings of 0 7 J and 7 Hz  The stones were fragmented to very small pieces  Approximately half of the stone burden was fragmented  At this point however the stone retropulsed into the renal pelvis  I was unable to reach it with the semi-rigid  I therefore converted to a flexible ureteroscopy by placing a 2nd working wire under vision  Over this a 12/14 access sheath was passed in an atraumatic fashion under fluoroscopic guidance    However due to some stenosis of the ureter secondary to the patient's radiation history, I was unable to advance these endoscopes into the renal pelvis to completely extract the stone  For this reason I chose to place a postoperative ureteral stent both to palliate his symptoms as well as to perform passive dilation of the ureter and facilitate a secondary procedure      A JJ stent was then passed up the wire  under fluoroscopic guidance into the kidney with a good curl noted in the kidney and in the bladder    The bladder was drained  The patient was placed back supine, awakened from general anesthesia and brought to recovery room in stable condition  ESTIMATED BLOOD LOSS:  Minimal      SPECIMENS:   * No orders in the log *     IMPLANTS:     Implant Name Type Inv  Item Serial No   Lot No  LRB No  Used   URETERAL STENT 6 FR X 22 CM OPTIMA INLAY - [id number]   URETERAL STENT 6 FR X 22 CM OPTIMA INLAY   [facility] [id number] Right 1        COMPLICATIONS: None    DISPOSITION: PACU    PLAN:  Patient will be able to be discharged home later this morning  He will require secondary procedure to extract his residual stone burden electively  I will communicate with his primary urologist Dr Ghassan Dahl to facilitate the above

## 2017-12-25 NOTE — DISCHARGE SUMMARY
Discharge Summary - [address] [facility]    Patient Information: Radha Talavera 68 y o  male MRN: [id number]  Unit/Bed#: [facility] 807-01 Encounter: [id number]    Discharging Physician / Practitioner: Mariela Ogden DO  PCP: Henna Vences MD  Admission Date: 12/24/2017  Discharge Date: 12/25/17    Reason for Admission:   Right ureteral calculus    Discharge Diagnoses:     Principal Problem:    Nephrolithiasis  Active Problems:    Right flank pain    HTN (hypertension)    Prostate cancer ([address] )    CAD (coronary artery disease)  Resolved Problems:    Hypertensive urgency      Consultations During Hospital Stay:  · Urology    Procedures Performed:   Abdominal pelvic CT scan Moderately obstructing 7 x 9 mm proximal right ureteric calculus      1) Cystoscopy  2) Right retrograde pyelography with fluoroscopic interpretation  3) Right ureteroscopy with laser lithotripsy of stone  4) Right ureteral stent placement     Significant Findings / Test Results:     As above    Incidental Findings:   · none    Test Results Pending at Discharge (will require follow up):    · Blood culture     Outpatient Tests Requested:  · none    Complications:  none    Hospital Course:     Radha Talavera is a 68 y o  male patient who originally presented to the hospital on 12/24/2017 due to right flank pain with some nausea no fever or chills no dysuria  He was evaluated in the emergency room and  found to have right ureteral stone on abdominal CT scan  Patient was admitted to the hospital, urology consulted, he underwent cystoscopy with laser lithotripsy of stone and stent placement  Currently he is in stable condition anxious to go home, no further pain, tolerating oral diet  He will require secondary procedure to extract his residual stone burden electively by his urologist Dr Kristine Alpers  Patient is anxious to go home, he will be discharged home today onn Flomax and Norco to follow with his family doctor in 1 week and with his urologist  Condition at Discharge: stable     Discharge Day Visit / Exam:     Subjective:    Patient seen and examined  Comfortable in bed  No further flank pain  Anxious to go home  Vitals: Blood Pressure: 154/93 (12/25/17 1130)  Pulse: (!) 115 (Rn notified ) (12/25/17 1130)  Temperature: 98 °F (36 7 °C) (12/25/17 1130)  Temp Source: Oral (12/25/17 1130)  Respirations: 18 (12/25/17 1130)  Height: 5' 6" (167 6 cm) (12/24/17 1250)  Weight - Scale: 88 5 kg (195 lb) (12/24/17 1250)  SpO2: 99 % (12/25/17 1130)  Exam:   Physical Exam  Patient is awake alert oriented x3 in no acute distress  Lung clear to auscultation bilateral  Heart positive S1-S2 no murmur  Abdomen soft nontender positive bowel sounds  Lower extremities no edema  Discussion with Family:  Unable to reach wife by phone    Discharge instructions/Information to patient and family:   See after visit summary for information provided to patient and family  Provisions for Follow-Up Care:  See after visit summary for information related to follow-up care and any pertinent home health orders  Disposition:     Home    For Discharges to [facility] SNF:   · Not Applicable to this Patient - Not Applicable to this Patient    Planned Readmission: no     Discharge Statement:  I spent 35  minutes discharging the patient  This time was spent on the day of discharge  I had direct contact with the patient on the day of discharge  Greater than 50% of the total time was spent examining patient, answering all patient questions, arranging and discussing plan of care with patient as well as directly providing post-discharge instructions  Additional time then spent on discharge activities  Discharge Medications:  See after visit summary for reconciled discharge medications provided to patient and family        ** Please Note: This note has been constructed using a voice recognition system **

## 2017-12-25 NOTE — PROGRESS NOTES
UROLOGY PROGRESS NOTE   Patient Identifiers: Benjamin Cunningham (MRN [id number])  Date of Service: 12/25/2017        Assessment:   49-year-old male with a 9 mm right proximal ureteral calculus  Remains symptomatic     Plan:   - to OR today for right ureteroscopy  - will plan for discharge later this morning once the patient has recovered from surgery        Subjective:     24 HR EVENTS:   no significant events  Patient has  complaints of Episodes of flank pain         Objective:     VITALS:    Vitals:    12/25/17 0700   BP: 140/90   Pulse: 82   Resp: 18   Temp: 97 8 °F (36 6 °C)   SpO2: 95%       INS & OUTS:  [unfilled]    LABS:  Lab Results   Component Value Date    HGB 13 0 12/25/2017    HCT 38 3 12/25/2017    WBC 6 41 12/25/2017     12/25/2017   ]    Lab Results   Component Value Date     12/25/2017    K 4 1 12/25/2017     12/25/2017    CO2 27 12/25/2017    BUN 19 12/25/2017    CREATININE 1 18 12/25/2017    CALCIUM 8 3 12/25/2017    GLUCOSE 114 12/25/2017   ]    INPATIENT MEDS:    Current Facility-Administered Medications:     [MAR Hold] acetaminophen (TYLENOL) tablet 650 mg, 650 mg, Oral, Q6H PRN, China Moore MD    amLODIPine (NORVASC) tablet 2 5 mg, 2 5 mg, Oral, Daily, China Moore MD, 2 5 mg at 12/24/17 1351    aspirin tablet 325 mg, 325 mg, Oral, Daily, China Moore MD, 325 mg at 12/24/17 1403    atorvastatin (LIPITOR) tablet 40 mg, 40 mg, Oral, Daily With Mable Delcid MD, 40 mg at 12/24/17 1612    carvedilol (COREG) tablet 12 5 mg, 12 5 mg, Oral, BID With Meals, China Moore MD, 12 5 mg at 12/24/17 1612    heparin (porcine) subcutaneous injection 5,000 Units, 5,000 Units, Subcutaneous, Q8H [address], 5,000 Units at 12/25/17 0517 **AND** [CANCELED] Platelet count, , , Once, China Moore MD    [facility] Hold] HYDROmorphone (DILAUDID) 1 mg/mL injection 1 mg, 1 mg, Intravenous, Q3H PRN, China Moore MD    labetalol (NORMODYNE) injection 10 mg, 10 mg, Intravenous, Q4H PRN, Gauri Bingham MD, 10 mg at 12/24/17 1351    multivitamin stress formula tablet 1 tablet, 1 tablet, Oral, Daily, Gauri Bingham MD, 1 tablet at 12/24/17 1403    nicotine (NICODERM CQ) 7 mg/24hr TD 24 hr patch 1 patch, 1 patch, Transdermal, Daily, Gauri Bingham MD, 1 patch at 12/24/17 1351    [MAR Hold] oxyCODONE (ROXICODONE) IR tablet 5 mg, 5 mg, Oral, Q4H PRN, Gauri Bingham MD, 5 mg at 12/24/17 2040    pantoprazole (PROTONIX) EC tablet 40 mg, 40 mg, Oral, Daily, Gauri Bingham MD, 40 mg at 12/24/17 1351    sodium chloride 0 9 % infusion, 125 mL/hr, Intravenous, Continuous, Gauri Bingham MD, Last Rate: 125 mL/hr at 12/24/17 2040, 125 mL/hr at 12/24/17 2040    [MAR Hold] tamsulosin (FLOMAX) capsule 0 4 mg, 0 4 mg, Oral, Daily With Florencia Andrade MD, 0 4 mg at 12/24/17 1718    tiZANidine (ZANAFLEX) tablet 2 mg, 2 mg, Oral, Q6H [address], Gauri Bingham MD, 2 mg at 12/24/17 2318    venlafaxine (EFFEXOR-XR) 24 hr capsule 225 mg, 225 mg, Oral, Daily, Gauri Bingham MD, 225 mg at 12/24/17 3069      Physical Exam:   /90   Pulse 82   Temp 97 8 °F (36 6 °C) (Oral)   Resp 18   Ht 5' 6" (1 676 m)   Wt 88 5 kg (195 lb)   SpO2 95%   BMI 31 47 kg/m²   GEN: alert and oriented x 3    RESP: breathing comfortably with no accessory muscle use    ABD: soft, appropriately tender to palpation, non-distended   EXT: no significant peripheral edema   DRAINS: none  POWERS: none

## 2017-12-25 NOTE — ANESTHESIA PREPROCEDURE EVALUATION
Review of Systems/Medical History  Patient summary reviewed  Chart reviewed      Cardiovascular  EKG reviewed, Exercise tolerance: poor,  Hyperlipidemia, Hypertension , CAD, , Cardiac stents > 1 year    Pulmonary       GI/Hepatic       Kidney stones,        Endo/Other  Arthritis     GYN       Hematology   Musculoskeletal  Obesity ,        Neurology   Psychology           Physical Exam    Airway    Mallampati score: III  TM Distance: >3 FB  Neck ROM: full     Dental   upper dentures and lower dentures,     Cardiovascular  Rhythm: regular, Rate: normal,     Pulmonary  Breath sounds clear to auscultation, Decreased breath sounds,     Other Findings        Anesthesia Plan  ASA Score- 3     Anesthesia Type- general with ASA Monitors  Additional Monitors:   Airway Plan: LMA  Plan Factors-Patient not instructed to abstain from smoking on day of procedure  Patient did not smoke on day of surgery  Induction- intravenous  Postoperative Plan-     Informed Consent- Anesthetic plan and risks discussed with patient  I personally reviewed this patient with the CRNA  Discussed and agreed on the Anesthesia Plan with the CRNA  Sofi Olivares

## 2017-12-27 ENCOUNTER — GENERIC CONVERSION - ENCOUNTER (OUTPATIENT)
Dept: OTHER | Facility: OTHER | Age: 77
End: 2017-12-27

## 2017-12-27 NOTE — CASE MANAGEMENT
Initial Clinical Review    Admission: Date/Time/Statement: 12/24/17 @ 0950     Orders Placed This Encounter   Procedures    Inpatient Admission (expected length of stay for this patient is greater than two midnights)     Standing Status:   Standing     Number of Occurrences:   1     Order Specific Question:   Admitting Physician     Answer:   Arielle Heath [[id number]]     Order Specific Question:   Level of Care     Answer:   Med Surg [[id number]]     Order Specific Question:   Estimated length of stay     Answer:   More than 2 Midnights     Order Specific Question:   Certification     Answer:   I certify that inpatient services are medically necessary for this patient for a duration of greater than two midnights  See H&P and MD Progress Notes for additional information about the patient's course of treatment  ED: Date/Time/Mode of Arrival:   ED Arrival Information     Expected Arrival Acuity Means of Arrival Escorted By Service Admission Type    - 12/24/2017 05:07 Urgent [facility]In [facility] General Medicine Urgent    Arrival Complaint    Flank Pain          Chief Complaint:   Chief Complaint   Patient presents with    Flank Pain     pt c/o right flank pain and 3 episodes of bowel movements this morning  History of Illness: Jenniffer Auguste is a 68 y o  male who presents with sudden onset right sided flank pain this morning  Pain is currently 5/10, but was "worse" and patient anticipates it "will get worse" after pain medication wears off       Patient is also complaining of some nausea  No fevers, chills and no burning on urination       He has a history of kidney stones and has had several procedures in the past       He does have hypertension and is on amlodipine and coreg at home  In addition he has a background of CAD s/p stents in the remote past       He does not have any CP or SOB  No other complaints other than right sided flank pain       ED Vital Signs:   ED Triage Vitals [12/24/17 0511]   Temperature Pulse Respirations Blood Pressure SpO2   98 4 °F (36 9 °C) 86 18 (!) 175/93 98 %      Temp Source Heart Rate Source Patient Position - Orthostatic VS BP Location FiO2 (%)   Tympanic Monitor Sitting Right arm --      Pain Score       Worst Possible Pain        Wt Readings from Last 1 Encounters:   12/24/17 88 5 kg (195 lb)           Abnormal Labs/Diagnostic Test Results:   Lab Units 12/24/17  0531   WBC Thousand/uL 7 10   HEMOGLOBIN g/dL 15 6   HEMATOCRIT % 44 9   PLATELETS Thousands/uL 196   NEUTROS PCT % 65   LYMPHS PCT % 18   MONOS PCT % 13*   EOS PCT % 3         Lab Units 12/24/17  0531   SODIUM mmol/L 142   POTASSIUM mmol/L 3 9   CHLORIDE mmol/L 105   CO2 mmol/L 30   BUN mg/dL 18   CREATININE mg/dL 1 03   CALCIUM mg/dL 9 3   TOTAL PROTEIN g/dL 8 0   BILIRUBIN TOTAL mg/dL 0 52   ALK PHOS U/L 76   ALT U/L 33   AST U/L 23   GLUCOSE RANDOM mg/dL 161*          Ct Abdomen Pelvis With Contrast     Result Date: 12/24/2017  Narrative: CT ABDOMEN AND PELVIS WITH IV CONTRAST INDICATION:  Right flank pain COMPARISON:   Impression: Moderately obstructing 7 x 9 mm proximal right ureteric calculus, at the level of the L3-L4 interspace      ED Treatment:   Medication Administration from 12/24/2017 0507 to 12/24/2017 1253       Date/Time Order Dose Route Action     12/24/2017 0531 sodium chloride 0 9 % bolus 1,000 mL 1,000 mL Intravenous New Bag     12/24/2017 0553 HYDROmorphone (DILAUDID) 1 mg/mL injection 0 5 mg 0 5 mg Intravenous Given     12/24/2017 0622 ketorolac (TORADOL) injection 15 mg 15 mg Intravenous Given     12/24/2017 0628 iohexol (OMNIPAQUE) 350 MG/ML injection (MULTI-DOSE) 100 mL 100 mL Intravenous Given     12/24/2017 0950 HYDROmorphone (DILAUDID) 1 mg/mL injection 0 5 mg 0 5 mg Intravenous Given       Past Medical/Surgical History:     Past Medical History:   Diagnosis Date    Arthritis     Cancer ([facility] [address] )     Cardiac disease     Coronary artery disease     Hyperlipidemia     Hypertension     Kidney stone  Prostate CA [facility])        Admitting Diagnosis: Kidney stone [N20 0]  Flank pain [R10 9]  Right flank pain [R10 9]    Age/Sex: 68 y o  male    Assessment/Plan: Plan for the Primary Problem(s):  Obstructing 7x9 mm proximal right ureteric calculus  Discussed with Urology and patient is for OR tomorrow  Can likely DC after procedure  Will give supportive care with IVF       Tylenol, oxycodone and dilaudid PRN for pain control       Cardiac diet with NPO from midnight       Right flank pain  2/2 above  Analgesia as above          Plan for Additional Problems:   Hypertensive urgency  BP is 178/90  Will continue home amlodipine, coreg  Will add PRN labetalol as well     Likely 2/2 pain at this point       CAD  C/w asa and statin         Admission Orders:  Scheduled Meds:   Current Facility-Administered Medications:     [MAR Hold] acetaminophen (TYLENOL) tablet 650 mg, 650 mg, Oral, Q6H PRN, Liz Beltrán MD    amLODIPine [facility]) tablet 2 5 mg, 2 5 mg, Oral, Daily, Liz Beltrán MD, 2 5 mg at 12/24/17 1351    aspirin tablet 325 mg, 325 mg, Oral, Daily, Liz Beltrán MD, 325 mg at 12/24/17 1403    atorvastatin (LIPITOR) tablet 40 mg, 40 mg, Oral, Daily With Lily Arredondo MD, 40 mg at 12/24/17 1612    carvedilol (COREG) tablet 12 5 mg, 12 5 mg, Oral, BID With Meals, Liz Beltrán MD, 12 5 mg at 12/24/17 1612    heparin (porcine) subcutaneous injection 5,000 Units, 5,000 Units, Subcutaneous, Q8H [address], 5,000 Units at 12/25/17 0517 **AND** [CANCELED] Platelet count, , , Once, Liz Beltrán MD    [facility] Hold] HYDROmorphone (DILAUDID) 1 mg/mL injection 1 mg, 1 mg, Intravenous, Q3H PRN, Liz Beltrán MD    labetalol (NORMODYNE) injection 10 mg, 10 mg, Intravenous, Q4H PRN, Liz Beltrán MD, 10 mg at 12/24/17 1351    multivitamin stress formula tablet 1 tablet, 1 tablet, Oral, Daily, Liz Beltrán MD, 1 tablet at 12/24/17 1403    nicotine (NICODERM CQ) 7 mg/24hr TD 24 hr patch 1 patch, 1 patch, Transdermal, Daily, Martha San MD, 1 patch at 12/24/17 1351    [MAR Hold] oxyCODONE (ROXICODONE) IR tablet 5 mg, 5 mg, Oral, Q4H PRN, Martha San MD, 5 mg at 12/24/17 2040    pantoprazole (PROTONIX) EC tablet 40 mg, 40 mg, Oral, Daily, Martha San MD, 40 mg at 12/24/17 1351    sodium chloride 0 9 % infusion, 125 mL/hr, Intravenous, Continuous, Martha San MD, Last Rate: 125 mL/hr at 12/24/17 2040, 125 mL/hr at 12/24/17 2040    [MAR Hold] tamsulosin (FLOMAX) capsule 0 4 mg, 0 4 mg, Oral, Daily With Dinner, Caitlin Carter MD, 0 4 mg at 12/24/17 1718    tiZANidine (ZANAFLEX) tablet 2 mg, 2 mg, Oral, Q6H [address], Martha San MD, 2 mg at 12/24/17 2318    venlafaxine (EFFEXOR-XR) 24 hr capsule 225 mg, 225 mg, Oral, Daily, Martha San MD, 225 mg at 12/24/17 4529      Urology Consult -  ASSESSMENT:      68 y o  old male with  a history of recurrent nephrolithiasis who presents acutely with a 9 mm right proximal ureteral calculus, hydronephrosis, and a delayed nephrogram   Currently his pain is very well controlled  He has no signs of systemic infection        Discussed options for management of the patient's ureteral stone  We discussed surgical options including ureteroscopy and shock wave lithotripsy  In addition we discussed conservative management with medical expulsive therapy  The patient has elected to undergo ureteroscopy  I discussed with the patients risks and benefits and alternatives to ureteroscopy with laser lithotripsy  The risks include bleeding, infection, injury to the urethra, bladder, ureter or kidney, risk of a staged procedure, risk of stricture, risk of residual fragments, risk of loss of kidney, risks of anesthesia including DVT, PE, MI and death  The patient understands that a ureteral stent will likely be left in place at the time of the procedure  We reviewed the expected postoperative care    The patient understands these risks and wishes to proceed with ureteroscopy         PLAN:      - surgery has been scheduled for tomorrow morning at 8:00 a m   - informed consent was obtained for right ureteroscopy  - given the patient's age and medical comorbidities I have recommended hospital admission in the perioperative period for close monitoring and pain control    [address] [facility] Internal Medicine consultation for this  - I anticipate he will be stable for discharge home tomorrow following surgery       OP note - 12/25   PROCEDURES PERFORMED:  1) Cystoscopy  2) Right retrograde pyelography with fluoroscopic interpretation  3) Right ureteroscopy with laser lithotripsy of stone  4) Right ureteral stent placement   Anesthesia - general

## 2017-12-29 LAB
BACTERIA BLD CULT: NORMAL
BACTERIA BLD CULT: NORMAL

## 2018-01-04 ENCOUNTER — ALLSCRIPTS OFFICE VISIT (OUTPATIENT)
Dept: OTHER | Facility: OTHER | Age: 78
End: 2018-01-04

## 2018-01-04 DIAGNOSIS — N20.0 CALCULUS OF KIDNEY: ICD-10-CM

## 2018-01-04 DIAGNOSIS — R31.0 GROSS HEMATURIA: ICD-10-CM

## 2018-01-10 ENCOUNTER — GENERIC CONVERSION - ENCOUNTER (OUTPATIENT)
Dept: OTHER | Facility: OTHER | Age: 78
End: 2018-01-10

## 2018-01-12 ENCOUNTER — HOSPITAL ENCOUNTER (OUTPATIENT)
Dept: RADIOLOGY | Facility: HOSPITAL | Age: 78
Discharge: HOME | End: 2018-01-12
Attending: UROLOGY
Payer: MEDICARE

## 2018-01-12 DIAGNOSIS — N20.0 CALCULUS OF KIDNEY: ICD-10-CM

## 2018-01-12 PROCEDURE — 74018 RADEX ABDOMEN 1 VIEW: CPT

## 2018-01-15 ENCOUNTER — GENERIC CONVERSION - ENCOUNTER (OUTPATIENT)
Dept: OTHER | Facility: OTHER | Age: 78
End: 2018-01-15

## 2018-01-18 ENCOUNTER — ANESTHESIA EVENT (OUTPATIENT)
Dept: PERIOP | Facility: HOSPITAL | Age: 78
End: 2018-01-18
Payer: MEDICARE

## 2018-01-19 ENCOUNTER — HOSPITAL ENCOUNTER (OUTPATIENT)
Dept: RADIOLOGY | Facility: HOSPITAL | Age: 78
Setting detail: OUTPATIENT SURGERY
Discharge: HOME | End: 2018-01-19
Payer: MEDICARE

## 2018-01-19 ENCOUNTER — HOSPITAL ENCOUNTER (OUTPATIENT)
Facility: HOSPITAL | Age: 78
Setting detail: OUTPATIENT SURGERY
Discharge: HOME | End: 2018-01-19
Attending: UROLOGY | Admitting: UROLOGY
Payer: MEDICARE

## 2018-01-19 ENCOUNTER — GENERIC CONVERSION - ENCOUNTER (OUTPATIENT)
Dept: OTHER | Facility: OTHER | Age: 78
End: 2018-01-19

## 2018-01-19 ENCOUNTER — ANESTHESIA (OUTPATIENT)
Dept: PERIOP | Facility: HOSPITAL | Age: 78
End: 2018-01-19
Payer: MEDICARE

## 2018-01-19 VITALS
DIASTOLIC BLOOD PRESSURE: 87 MMHG | BODY MASS INDEX: 32.14 KG/M2 | RESPIRATION RATE: 16 BRPM | OXYGEN SATURATION: 94 % | SYSTOLIC BLOOD PRESSURE: 147 MMHG | HEIGHT: 66 IN | WEIGHT: 200 LBS | TEMPERATURE: 98.8 F | HEART RATE: 90 BPM

## 2018-01-19 DIAGNOSIS — N20.0 CALCULUS OF KIDNEY: ICD-10-CM

## 2018-01-19 LAB
PLATELET # BLD AUTO: 162 THOUSANDS/UL (ref 149–390)
PMV BLD AUTO: 9.8 FL (ref 8.9–12.7)

## 2018-01-19 PROCEDURE — 85049 AUTOMATED PLATELET COUNT: CPT | Performed by: UROLOGY

## 2018-01-19 PROCEDURE — C1769 GUIDE WIRE: HCPCS | Performed by: UROLOGY

## 2018-01-19 PROCEDURE — C1894 INTRO/SHEATH, NON-LASER: HCPCS | Performed by: UROLOGY

## 2018-01-19 PROCEDURE — 51798 US URINE CAPACITY MEASURE: CPT | Performed by: UROLOGY

## 2018-01-19 PROCEDURE — C1758 CATHETER, URETERAL: HCPCS | Performed by: UROLOGY

## 2018-01-19 PROCEDURE — C2625 STENT, NON-COR, TEM W/DEL SY: HCPCS | Performed by: UROLOGY

## 2018-01-19 PROCEDURE — 76000 FLUOROSCOPY <1 HR PHYS/QHP: CPT

## 2018-01-19 PROCEDURE — 74018 RADEX ABDOMEN 1 VIEW: CPT

## 2018-01-19 DEVICE — KWART RETRO-INJECT URETERAL STENT SET
Type: IMPLANTABLE DEVICE | Site: URETER | Status: FUNCTIONAL
Brand: KWART

## 2018-01-19 RX ORDER — MEPERIDINE HYDROCHLORIDE 50 MG/ML
12.5 INJECTION INTRAMUSCULAR; INTRAVENOUS; SUBCUTANEOUS AS NEEDED
Status: DISCONTINUED | OUTPATIENT
Start: 2018-01-19 | End: 2018-01-19 | Stop reason: HOSPADM

## 2018-01-19 RX ORDER — MORPHINE SULFATE 10 MG/ML
4 INJECTION, SOLUTION INTRAMUSCULAR; INTRAVENOUS EVERY 6 HOURS PRN
Status: DISCONTINUED | OUTPATIENT
Start: 2018-01-19 | End: 2018-01-19 | Stop reason: HOSPADM

## 2018-01-19 RX ORDER — LABETALOL HYDROCHLORIDE 5 MG/ML
INJECTION, SOLUTION INTRAVENOUS AS NEEDED
Status: DISCONTINUED | OUTPATIENT
Start: 2018-01-19 | End: 2018-01-19 | Stop reason: SURG

## 2018-01-19 RX ORDER — OXYCODONE HYDROCHLORIDE AND ACETAMINOPHEN 5; 325 MG/1; MG/1
1 TABLET ORAL EVERY 4 HOURS PRN
Qty: 20 TABLET | Refills: 0 | Status: SHIPPED | OUTPATIENT
Start: 2018-01-19 | End: 2018-02-01 | Stop reason: SDUPTHER

## 2018-01-19 RX ORDER — OXYCODONE HYDROCHLORIDE AND ACETAMINOPHEN 5; 325 MG/1; MG/1
1 TABLET ORAL EVERY 4 HOURS PRN
Status: DISCONTINUED | OUTPATIENT
Start: 2018-01-19 | End: 2018-01-19 | Stop reason: HOSPADM

## 2018-01-19 RX ORDER — FENTANYL CITRATE 50 UG/ML
INJECTION, SOLUTION INTRAMUSCULAR; INTRAVENOUS AS NEEDED
Status: DISCONTINUED | OUTPATIENT
Start: 2018-01-19 | End: 2018-01-19 | Stop reason: SURG

## 2018-01-19 RX ORDER — LIDOCAINE HYDROCHLORIDE 10 MG/ML
INJECTION, SOLUTION INFILTRATION; PERINEURAL AS NEEDED
Status: DISCONTINUED | OUTPATIENT
Start: 2018-01-19 | End: 2018-01-19 | Stop reason: SURG

## 2018-01-19 RX ORDER — MORPHINE SULFATE 2 MG/ML
4 INJECTION, SOLUTION INTRAMUSCULAR; INTRAVENOUS EVERY 6 HOURS PRN
Status: DISCONTINUED | OUTPATIENT
Start: 2018-01-19 | End: 2018-01-19 | Stop reason: HOSPADM

## 2018-01-19 RX ORDER — HEPARIN SODIUM 5000 [USP'U]/ML
5000 INJECTION, SOLUTION INTRAVENOUS; SUBCUTANEOUS ONCE
Status: COMPLETED | OUTPATIENT
Start: 2018-01-19 | End: 2018-01-19

## 2018-01-19 RX ORDER — GENTAMICIN SULFATE 40 MG/ML
INJECTION, SOLUTION INTRAMUSCULAR; INTRAVENOUS AS NEEDED
Status: DISCONTINUED | OUTPATIENT
Start: 2018-01-19 | End: 2018-01-19 | Stop reason: SURG

## 2018-01-19 RX ORDER — SODIUM CHLORIDE 9 MG/ML
125 INJECTION, SOLUTION INTRAVENOUS CONTINUOUS
Status: DISCONTINUED | OUTPATIENT
Start: 2018-01-19 | End: 2018-01-19 | Stop reason: HOSPADM

## 2018-01-19 RX ORDER — ONDANSETRON 2 MG/ML
INJECTION INTRAMUSCULAR; INTRAVENOUS AS NEEDED
Status: DISCONTINUED | OUTPATIENT
Start: 2018-01-19 | End: 2018-01-19 | Stop reason: SURG

## 2018-01-19 RX ORDER — OXYBUTYNIN CHLORIDE 10 MG/1
10 TABLET, EXTENDED RELEASE ORAL
Qty: 30 TABLET | Refills: 1 | Status: SHIPPED | OUTPATIENT
Start: 2018-01-19 | End: 2018-03-19

## 2018-01-19 RX ORDER — OXYCODONE HYDROCHLORIDE AND ACETAMINOPHEN 5; 325 MG/1; MG/1
2 TABLET ORAL EVERY 4 HOURS PRN
Status: DISCONTINUED | OUTPATIENT
Start: 2018-01-19 | End: 2018-01-19 | Stop reason: HOSPADM

## 2018-01-19 RX ORDER — PROPOFOL 10 MG/ML
INJECTION, EMULSION INTRAVENOUS AS NEEDED
Status: DISCONTINUED | OUTPATIENT
Start: 2018-01-19 | End: 2018-01-19 | Stop reason: SURG

## 2018-01-19 RX ORDER — FENTANYL CITRATE/PF 50 MCG/ML
50 SYRINGE (ML) INJECTION
Status: DISCONTINUED | OUTPATIENT
Start: 2018-01-19 | End: 2018-01-19 | Stop reason: HOSPADM

## 2018-01-19 RX ORDER — ALBUTEROL SULFATE 2.5 MG/3ML
2.5 SOLUTION RESPIRATORY (INHALATION) ONCE AS NEEDED
Status: DISCONTINUED | OUTPATIENT
Start: 2018-01-19 | End: 2018-01-19 | Stop reason: HOSPADM

## 2018-01-19 RX ORDER — SULFAMETHOXAZOLE AND TRIMETHOPRIM 800; 160 MG/1; MG/1
1 TABLET ORAL DAILY
Qty: 14 TABLET | Refills: 0 | Status: SHIPPED | OUTPATIENT
Start: 2018-01-19 | End: 2018-02-02

## 2018-01-19 RX ORDER — ROCURONIUM BROMIDE 10 MG/ML
INJECTION, SOLUTION INTRAVENOUS AS NEEDED
Status: DISCONTINUED | OUTPATIENT
Start: 2018-01-19 | End: 2018-01-19 | Stop reason: SURG

## 2018-01-19 RX ADMIN — FENTANYL CITRATE 50 MCG: 50 INJECTION, SOLUTION INTRAMUSCULAR; INTRAVENOUS at 13:21

## 2018-01-19 RX ADMIN — HEPARIN SODIUM 5000 UNITS: 5000 INJECTION, SOLUTION INTRAVENOUS; SUBCUTANEOUS at 10:38

## 2018-01-19 RX ADMIN — OXYCODONE HYDROCHLORIDE AND ACETAMINOPHEN 1 TABLET: 5; 325 TABLET ORAL at 15:03

## 2018-01-19 RX ADMIN — ONDANSETRON HYDROCHLORIDE 4 MG: 2 INJECTION, SOLUTION INTRAVENOUS at 12:58

## 2018-01-19 RX ADMIN — SODIUM CHLORIDE: 0.9 INJECTION, SOLUTION INTRAVENOUS at 11:27

## 2018-01-19 RX ADMIN — LABETALOL HYDROCHLORIDE 5 MG: 5 INJECTION, SOLUTION INTRAVENOUS at 12:03

## 2018-01-19 RX ADMIN — FENTANYL CITRATE 50 MCG: 50 INJECTION INTRAMUSCULAR; INTRAVENOUS at 12:25

## 2018-01-19 RX ADMIN — SODIUM CHLORIDE: 0.9 INJECTION, SOLUTION INTRAVENOUS at 12:18

## 2018-01-19 RX ADMIN — FENTANYL CITRATE 50 MCG: 50 INJECTION INTRAMUSCULAR; INTRAVENOUS at 11:42

## 2018-01-19 RX ADMIN — ROCURONIUM BROMIDE 30 MG: 10 INJECTION INTRAVENOUS at 11:30

## 2018-01-19 RX ADMIN — SODIUM CHLORIDE 125 ML/HR: 0.9 INJECTION, SOLUTION INTRAVENOUS at 08:33

## 2018-01-19 RX ADMIN — LIDOCAINE HYDROCHLORIDE 50 MG: 10 INJECTION, SOLUTION INFILTRATION; PERINEURAL at 11:30

## 2018-01-19 RX ADMIN — ROCURONIUM BROMIDE 10 MG: 10 INJECTION INTRAVENOUS at 12:15

## 2018-01-19 RX ADMIN — PROPOFOL 120 MG: 10 INJECTION, EMULSION INTRAVENOUS at 11:30

## 2018-01-19 RX ADMIN — FENTANYL CITRATE 50 MCG: 50 INJECTION INTRAMUSCULAR; INTRAVENOUS at 11:29

## 2018-01-19 RX ADMIN — DEXAMETHASONE SODIUM PHOSPHATE 4 MG: 10 INJECTION INTRAMUSCULAR; INTRAVENOUS at 12:55

## 2018-01-19 RX ADMIN — GENTAMICIN SULFATE 70 MG: 40 INJECTION, SOLUTION INTRAMUSCULAR; INTRAVENOUS at 11:31

## 2018-01-19 RX ADMIN — FENTANYL CITRATE 50 MCG: 50 INJECTION INTRAMUSCULAR; INTRAVENOUS at 12:40

## 2018-01-19 RX ADMIN — CEFAZOLIN SODIUM 2000 MG: 2 SOLUTION INTRAVENOUS at 11:34

## 2018-01-19 RX ADMIN — FENTANYL CITRATE 50 MCG: 50 INJECTION INTRAMUSCULAR; INTRAVENOUS at 12:15

## 2018-01-19 NOTE — ANESTHESIA POSTPROCEDURE EVALUATION
Post-Op Assessment Note      CV Status:  Stable    Mental Status:  Alert and awake    Hydration Status:  Euvolemic    PONV Controlled:  Controlled    Airway Patency:  Patent    Post Op Vitals Reviewed:  Yes              BP     Temp      Pulse     Resp      SpO2 96 % (01/19/18 1321)

## 2018-01-19 NOTE — PROGRESS NOTES
Patient continues to report intermittent pain; discussed pain with patient  Patient reports he only has pain when he "tries" to urinate  Advised patient not to force urine output  Discussed giving him time to feel the urge to urinate, and we will scan him again at 1700 to evaluate  Patient denies pain at rest  Confirms he will no longer try to force his urine  Discussed availability of morphine; but patient without pain at present  Patient states he had trouble urinating after a prior surgery; helped to use warm soaks on his lower abdomen  Warm water soaked towel applied

## 2018-01-19 NOTE — ANESTHESIA PREPROCEDURE EVALUATION
Review of Systems/Medical History  Patient summary reviewed  Chart reviewed  No history of anesthetic complications     Cardiovascular  Hyperlipidemia, Hypertension on > 1 medication, No past MI , CAD, CAD status: 1VD, No history of CABG, Cardiac stents > 1 year No history of percutaneous transluminal coronary angioplasty, No angina ,    Pulmonary  Smoker cigar smoker  ,        GI/Hepatic  Negative GI/hepatic ROS          Kidney stones, Prostatic disorder, history of prostate cancer       Endo/Other  Negative endo/other ROS      GYN       Hematology   Musculoskeletal    Arthritis     Neurology  Negative neurology ROS      Psychology   Depression , being treated for depression,              Physical Exam    Airway    Mallampati score: II  TM Distance: >3 FB  Neck ROM: full     Dental   lower dentures and upper dentures,     Cardiovascular  Rhythm: regular, Rate: normal, Cardiovascular exam normal    Pulmonary  Pulmonary exam normal Breath sounds clear to auscultation,     Other Findings        Anesthesia Plan  ASA Score- 3     Anesthesia Type- general with ASA Monitors  Additional Monitors:   Airway Plan:         Plan Factors-Patient not instructed to abstain from smoking on day of procedure  Patient did not smoke on day of surgery  Induction- intravenous  Postoperative Plan- Plan for postoperative opioid use  Informed Consent- Anesthetic plan and risks discussed with patient and spouse

## 2018-01-19 NOTE — DISCHARGE INSTRUCTIONS
Ureteral Stent Placement   WHAT YOU NEED TO KNOW:   Ureteral stent placement is a procedure to open a blocked or narrow ureter  The ureter is the tube that carries urine from your kidney into your bladder  A stent is a thin hollow plastic tube used to hold your ureter open and allow urine to flow  The stent may stay in for several weeks  DISCHARGE INSTRUCTIONS:   Medicines:   · Pain medicine  may be given to take away or decrease pain  Do not wait until the pain is severe before you take your medicine  · Antibiotics  help prevent infections  Your healthcare provider may prescribe these for you while your stent remains in  · Take your medicine as directed  Contact your healthcare provider if you think your medicine is not helping or if you have side effects  Tell him or her if you are allergic to any medicine  Keep a list of the medicines, vitamins, and herbs you take  Include the amounts, and when and why you take them  Bring the list or the pill bottles to follow-up visits  Carry your medicine list with you in case of an emergency  Follow up with your urologist as directed: You will need regular follow-up visits with your urologist as long as the stent remains in  He will check to make sure the stent is working properly  He may do urine cultures to check for infection  Write down your questions so you remember to ask them during your visits  Self-care:   · Drink liquids  as directed  Ask your healthcare provider how much liquid to drink each day and which liquids are best for you  Fluids such as cranberry or apple juice may be especially helpful to prevent urinary infections  · Return to normal activities  the day after your stent placement or as directed by your healthcare provider  · You may take a shower  the day after your stent placement if your healthcare provider says it is okay  Contact your healthcare provider or urologist if:   · You have a fever or chills      · You feel like you need to urinate often  · You have pain when you urinate or pain around your bladder or kidney  · You see blood in your urine or it looks cloudy  · You have questions or concerns about your condition or care  Seek care immediately or call 911 if:   · You urinate little or not at all  · You have severe pain in your abdomen  © 2017 [employer] Information is for End User's use only and may not be sold, redistributed or otherwise used for commercial purposes  All illustrations and images included in CareNotes® are the copyrighted property of A D A M , Inc  or Denzel Leong  The above information is an  only  It is not intended as medical advice for individual conditions or treatments  Talk to your doctor, nurse or pharmacist before following any medical regimen to see if it is safe and effective for you  Ureteroscopy   WHAT YOU NEED TO KNOW:   A ureteroscopy is a procedure to examine in the inside of your urinary tract, which includes your urethra, bladder, ureters, and kidneys  A ureteroscope is a small, thin tube with a light and camera on the end  Ureteroscopy can help your healthcare provider diagnose and treat problems in your urinary tract, such as kidney stones  DISCHARGE INSTRUCTIONS:   Medicine:   · Antibiotics  may be given to treat or prevent an infection  · Take your medicine as directed  Contact your healthcare provider if you think your medicine is not helping or if you have side effects  Tell him or her if you are allergic to any medicine  Keep a list of the medicines, vitamins, and herbs you take  Include the amounts, and when and why you take them  Bring the list or the pill bottles to follow-up visits  Carry your medicine list with you in case of an emergency  Follow up with your healthcare provider as directed:  Write down your questions so you remember to ask them during your visits  Drink liquids as directed    Liquids can help prevent kidney stones and urinary tract infections  Drink water and limit the amount of caffeine you drink  Caffeine may be found in coffee, tea, soda, sports drinks, and foods  Ask your healthcare provider how much liquid to drink each day  Contact your healthcare provider if:   · You have a fever  · You cannot urinate  · You have blood in your urine  · You are vomiting  · You have pain in your abdomen or side  · You have questions or concerns about your condition or care  © 2017 [employer]  Information is for End User's use only and may not be sold, redistributed or otherwise used for commercial purposes  All illustrations and images included in CareNotes® are the copyrighted property of A D A M , Inc  or [employer]  The above information is an  only  It is not intended as medical advice for individual conditions or treatments  Talk to your doctor, nurse or pharmacist before following any medical regimen to see if it is safe and effective for you  Ureteroscopy   WHAT YOU NEED TO KNOW:   A ureteroscopy is a procedure to examine in the inside of your urinary tract, which includes your urethra, bladder, ureters, and kidneys  A ureteroscope is a small, thin tube with a light and camera on the end  Ureteroscopy can help your healthcare provider diagnose and treat problems in your urinary tract, such as kidney stones  DISCHARGE INSTRUCTIONS:   Medicine:   · Antibiotics  may be given to treat or prevent an infection  · Take your medicine as directed  Contact your healthcare provider if you think your medicine is not helping or if you have side effects  Tell him or her if you are allergic to any medicine  Keep a list of the medicines, vitamins, and herbs you take  Include the amounts, and when and why you take them  Bring the list or the pill bottles to follow-up visits  Carry your medicine list with you in case of an emergency    Follow up with your healthcare provider as directed:  Write down your questions so you remember to ask them during your visits  Drink liquids as directed  Liquids can help prevent kidney stones and urinary tract infections  Drink water and limit the amount of caffeine you drink  Caffeine may be found in coffee, tea, soda, sports drinks, and foods  Ask your healthcare provider how much liquid to drink each day  Contact your healthcare provider if:   · You have a fever  · You cannot urinate  · You have blood in your urine  · You are vomiting  · You have pain in your abdomen or side  · You have questions or concerns about your condition or care  © 2017 [employer]  Information is for End User's use only and may not be sold, redistributed or otherwise used for commercial purposes  All illustrations and images included in CareNotes® are the copyrighted property of A D A M , Inc  or Denzel Leong  The above information is an  only  It is not intended as medical advice for individual conditions or treatments  Talk to your doctor, nurse or pharmacist before following any medical regimen to see if it is safe and effective for you

## 2018-01-19 NOTE — DISCHARGE SUMMARY
Discharge Summary - Shanna Kaufman 68 y o  male MRN: [id number]    Unit/Bed#: OR POOL Encounter: [id number]    Admission Date: 1/19/2018     Discharge Date:  01/19/2018  Admitting Diagnosis: Calculus of kidney [N20 0] right ureteral stone    Admitting Provider: Jonathan Hoover MD    Discharging Provider: Jonathan Hoover MD    Primary Care Physician at Discharge: Titus De La Cruz [phone]     HPI:This is a 68 y o  old male presented with Calculus of kidney [N20 0]  The patient was found to have right renal colic secondary to a 5-6 mm calculus at L3 in the right ureter  Attempted ureteroscopy failed to reach the stone and therefore ureteral stent was left in place in the patient now returns for cystoscopy right retrograde pyelography right ureteroscopic laser lithotripsy and stent exchange  The patient is aware and agrees to the procedure and is aware of potential of infection bleeding stricture perforation need for additional procedures retained stone or stone fragments  Allergies   Allergen Reactions    Indomethacin Other (See Comments)     Pt unsure of reaction     Lisinopril Other (See Comments)     COUGH    Niacin      Other reaction(s): Other (See Comments)  red, flushed skin    Nystatin     Rosuvastatin Other (See Comments)     MUSCLE ACHES       Consults    None          Procedures Performed: No orders of the defined types were placed in this encounter  Hospital Course: None patient tolerated hospitalization well without complication  Significant Findings, Care, Treatment and Services Provided:  Right 5-6 mm proximal ureteral calculus    Complications:  None    Discharge Diagnosis:  Right ureteral calculus    Condition at Discharge: good     Discharge instructions/Information to patient and family:   See after visit summary for information provided to patient and family        Provisions for Follow-Up Care:  See after visit summary for information related to follow-up care and any pertinent home health orders  Disposition: Home    Planned Readmission: No    Discharge Statement   I spent 45 minutes discharging the patient  This time was spent on the day of discharge  I had direct contact with the patient on the day of discharge  Additional documentation is required if more than 30 minutes were spent on discharge  Discharge Medications:  See after visit summary for reconciled discharge medications provided to patient and family          ?  ?

## 2018-01-19 NOTE — INTERIM OP NOTE
CYSTOSCOPY URETEROSCOPIC STONE EXTRACTION  WITH LITHOTRIPSY HOLMIUM LASER, RETROGRADE PYELOGRAM AND INSERTION STENT URETERAL  Postoperative Note  PATIENT NAME: Jatin Mcadams  : 1940  MRN: [id number]  [facility] OR ROOM 04    Surgery Date: 2018    Preop Diagnosis:  Calculus of kidney [N20 0]    Post-Op Diagnosis Codes:     * Calculus of kidney [N20 0]     * Ureteral calculus, left [N20 1]    Procedure(s) (LRB):  CYSTOSCOPY URETEROSCOPIC STONE EXTRACTION  WITH LITHOTRIPSY HOLMIUM LASER, RETROGRADE PYELOGRAM AND INSERTION STENT URETERAL (Right)    Surgeon(s) and Role:     * Devi Knox MD - Primary    Specimens:  * No specimens in log *    Estimated Blood Loss:   1 mL    Anesthesia Type:   General     Findings:    Right proximal ureteral calculus approximately 5-6 mm in size at approximately the level of L3, radio-opaque  Evidence of right ureteral stricture just below the aforementioned calculus that did not appear to be severely obstructed and did allow the ureteral scope passed  At the conclusion of the laser lithotripsy the stone was fragmented into sub mm size fragments and contrast injection revealed good placement both proximally and distally with respect to the right ureteral stent    Complications:   None    SIGNATURE: Devi Knox MD   DATE: 2018   TIME: 1:06 PM

## 2018-01-19 NOTE — OP NOTE
OPERATIVE REPORT  PATIENT NAME: Jamil Melendez    :  1940  MRN: [id number]  Pt Location: [facility] ROOM 04    SURGERY DATE: 2018    Surgeon(s) and Role:     * Katya Chakraborty MD - Primary    Preop Diagnosis:  Calculus of kidney with a wire    Post-Op Diagnosis Codes:     * Calculus of kidney I had to approximately the level of L4 pair     * Ureteral calculus, left   The flexible ureteral scope was placed through the access sheath and the stone was engaged  Procedure(s) (LRB):  CYSTOSCOPY URETEROSCOPIC STONE EXTRACTION  WITH LITHOTRIPSY HOLMIUM LASER, RETROGRADE PYELOGRAM AND INSERTION STENT URETERAL (Right)    Specimen(s):  * No specimens in log *    Estimated Blood Loss:   1 mL    Drains:  6 Ukrainian right ureteral Kwart stent stenting right renal pelvis via the right ureter into the urinary bladder with a trans urethral Dangler suture cut short       Anesthesia Type:   General    Operative Indications:  Calculus of kidney   Operative Findings:  Left proximal ureteral calculus at L3, radiopaque  On ureteroscopy there appeared to be a fibrous stricture just below the calculus that was more than likely impeding its antegrade passage however the stricture was patent enough to allow passage of the flexible ureteral scope  The stone was fragmented using laser lithotripsy into submillimeter fragments some of which floated distally out the ureter and some floated up back into the kidney and were not able to be retrieved because of the significant amount of blood clot mucoid material and stone fragments in the renal pelvis obscuring visualization  Repeat imaging will take place at a later time prior to stent removal     Complications:   None    Procedure and Technique:  The patient was seen in the holding area and we discussed cystoscopy right retrograde pyelography right ureteroscopic laser lithotripsy and stent exchange    The patient agreed to the procedure understanding risks of bleeding retained stone or stone fragments need for additional endoscopic or open procedures as well as the possibility of scarring stricture ring or contracture  The patient also understands the possibility of perforation infection and pain  The patient agreed to the procedure as outlined  Patient was brought to the operating room identified by Robert Ramirez MD prepped and draped in usual sterile fashion in the dorsal lithotomy position after general anesthesia was induced  Using a 25 Montenegrin cystoscope with a 30 degree lens the urethra was intubated and the bladder entered  The urethra was within normal limits without stricture lesion the prostate was absent the urethrovesical anastomosis was open and not scar yuan  Ureteral orifices were normal position and configuration bilaterally with clear efflux bilaterally the bladder is free of any intrinsic lesions or evidence of extrinsic mass compression  A preexistent stent was grasped using grasping forceps brought out to the urethral meatus and the cystoscope was removed  A 0 035 Kole-coated floppy tip guidewire was inserted up the ureteral stent and under fluoroscopic control its proximal extent was noted within the renal pelvis the stent was then removed and the wire was used as a safety wire  Over that wire a double lumen access catheter was placed up to the region of the stone and another 0 035 Kole-coated working wire was passed up the double-lumen catheter into the renal pelvis  The double-lumen catheter was removed over the wire leaving 2 wires in place proximally in the renal pelvis 1 acting as a safety wire and the other as a working wire  With only 2 wires in the patient at this point an access sheath 35 cm in length was placed over the working wire to a point just below the imaged stone  Through that a flexible ureteral scope was placed and the holmium laser was used to fragment the stone    Visualization here was very poor because of blood clot and blood in the urinary tract as well as a more phos mucoid material     Multiple fragments were left that washed out distally through the stone around the scope and access sheath as well as smaller fragments washing up into the renal pelvis which were chased into the renal pelvis further fragmented but most were lost because of blood and blood clot and poor visualization  At this point the ureteral scope was removed from the patient along with the access sheath under direct vision  A small mucosal rent was noted just below where the stone was in the area of the laser lithotripsy  On the wire was then backloaded into a cystoscope which was placed per urethra into the urinary bladder and over the wire a double J Kwart stent was placed and internalized after contrast injection confirmed good positioning of the stent  The safety wire was then removed as was everything else from the urinary tract except for the stent that was placed  The patient was extubated and transferred to the recovery room stable condition  Follow-up fluoroscopy revealed no radiopaque calculi along the extent of the ureter  Future imaging will take place in order to assess for any persistent stone fragments    I was present for the entire procedure and A qualified resident physician was not available    Patient Disposition:  PACU     SIGNATURE: Luciana Purdy MD  DATE: January 19, 2018  TIME: 1:09 PM

## 2018-01-20 NOTE — PROGRESS NOTES
Spoke with Dr Suyapa Garay about patient  Made aware initial bladder scan of 513, void of 100, urinary incontinence when OOB to BR, and subsequent scan of 277  Dr Suyapa Garay states patient may go home with Judd catheter  If patient refuses, and he is unable to urinate at home tonight, he will have to call Urology and go to the Emergency room  Spoke with patient; related Dr Shelton Tamez recommendation that a judd catheter be placed and patient may go home, but if the patient refused and went home and was unable to urinate tonight, he would have to call Urology and go to the Emergency Room and possibly get a catheter  Patient refused Judd catheter; patient and his wife both v/u they will need to call Dr Shelton Tamez answering service if he is unable to urinate tonight, and he will have to go to the Emergency Department  Patient's wife states they live very close to [facility]

## 2018-01-22 ENCOUNTER — GENERIC CONVERSION - ENCOUNTER (OUTPATIENT)
Dept: OTHER | Facility: OTHER | Age: 78
End: 2018-01-22

## 2018-01-22 VITALS
DIASTOLIC BLOOD PRESSURE: 80 MMHG | HEIGHT: 67 IN | SYSTOLIC BLOOD PRESSURE: 120 MMHG | WEIGHT: 193 LBS | BODY MASS INDEX: 30.29 KG/M2

## 2018-01-23 NOTE — MISCELLANEOUS
Message   Recorded as Task   Date: 12/27/2017 10:08 AM, Created By: Monica Tran   Task Name: Medical Complaint Callback   Assigned To: aR MIRAMONTES,TEAM   Regarding Patient: Jair Crocker, Status: Active   CommentVester Ursula - 27 Dec 2017 10:08 AM     TASK CREATED  Caller: Tuyet Jeancarla; [phone]  Wife calling for pt who is having light pink blood and frequency since 12/25/17 procedure  Alejandra Gonzalez - 27 Dec 2017 10:20 AM     TASK EDITED  PT'S  WIFE STATES THAT THERE IS A LITTLE  HEMATURIA AT TIMES, AND IT'S DARK BROWN  I INFORMED HER TO MAKE SURE HER  DRINKS PLENTY OF FLUIDS, AND THAT WAS NORMAL FINDINGS SINCE HE JUST HAD SURGERY TWO DAYS AGO  Active Problems    1  Adenocarcinoma of prostate (185) (C61)   2  Gross hematuria (599 71) (R31 0)    Current Meds   1  Adult Aspirin EC Low Strength 81 MG Oral Tablet Delayed Release; Therapy: (Recorded:80Trx3236) to Recorded   2  AmLODIPine Besylate 2 5 MG Oral Tablet; Therapy: (Recorded:71Nis7011) to Recorded   3  Calcium 600-D 600-400 MG-UNIT Oral Tablet; Therapy: (08 9005 7781) to Recorded   4  Carvedilol 6 25 MG Oral Tablet; Therapy: (08 9001 7781) to Recorded   5  Daily Multivitamin TABS; Therapy: ((08 900 7781) to Recorded   6  Ibuprofen 200 MG Oral Tablet; Therapy: (08 9004 7781) to Recorded   7  Meclizine HCl - 25 MG Oral Tablet; Therapy: (([phone] 9008 7781) to Recorded   8  Mobic 15 MG Oral Tablet (Meloxicam); Therapy: (([phone] 9009 7781) to Recorded   9  PriLOSEC OTC 20 MG Oral Tablet Delayed Release; Therapy: (08 9007 7781) to Recorded   10  Rosuvastatin Calcium 20 MG Oral Tablet; Therapy: (08 9007 7781) to Recorded   11  Venlafaxine HCl  MG Oral Capsule Extended Release 24 Hour; Therapy: (08 9000 8381) to Recorded   12  Vitamin B Complex Oral Tablet; Therapy: (08 9003 8881) to Recorded   13   Zanaflex 2 MG Oral Capsule (TiZANidine HCl);    Therapy: (Recorded:83Jze3772) to Recorded    Allergies    1   Niacin TABS    Signatures   Electronically signed by : Geo Childers, ; Dec 27 2017 10:21AM EST                       (Author)

## 2018-01-23 NOTE — MISCELLANEOUS
Message   Recorded as Task   Date: 01/15/2018 10:04 AM, Created By: Wesley Castellano   Task Name: Call Back   Assigned To: Ra MIRAMONTES,TEAM   Regarding Patient: Jair Crocker, Status: In Progress   Comment:    Lula Cage - 15 Mohit 2018 10:04 AM     TASK CREATED  pt called, is having severe pain, using multiple pads, has blood- wants to speak to nurse only  Pls call pt [phone]   Caren Rdz - 15 Mohit 2018 10:17 AM     TASK EDITED  PT HAS STENT, SCHED FOR SECOND STONE SURGERY ON 1/19  URINARY URGENCY AND FREQ NOT ALLEVIATED BY OYBUTYNIN 5 MG  STILL HAS DYSURIA  URINE CULTURE DONE ON 1/11 NEG  WILL DIRECT TO DR Christ Pretty RE; ADDITIONAL ORDERS  Caren Rdz - 15 Mohit 2018 10:17 AM     TASK IN PROGRESS   Caren Rdz - 15 Mohit 2018 12:10 PM     TASK EDITED  PER DR CHAN PT TO TRY OXYBUTYNIN XL 10 MG DAILY  ERx WEEK SUPPLY TO PHARM  PT AWARE  Active Problems    1  Adenocarcinoma of prostate (185) (C61)   2  Gross hematuria (599 71) (R31 0)   3  Kidney stone (592 0) (N20 0)    Current Meds   1  Adult Aspirin EC Low Strength 81 MG Oral Tablet Delayed Release; Therapy: (Recorded:03Qvu7500) to Recorded   2  AmLODIPine Besylate 2 5 MG Oral Tablet; Therapy: (Recorded:08Vni2840) to Recorded   3  Calcium 600-D 600-400 MG-UNIT Oral Tablet; Therapy: ((08) 9000 7781) to Recorded   4  Carvedilol 6 25 MG Oral Tablet; Therapy: (([phone] 9009 7781) to Recorded   5  Daily Multivitamin TABS; Therapy: ((08) 9009 7781) to Recorded   6  Ibuprofen 200 MG Oral Tablet; Therapy: (([phone] 9009 7781) to Recorded   7  Meclizine HCl - 25 MG Oral Tablet; Therapy: ((08) 900 7781) to Recorded   8  Mobic 15 MG Oral Tablet (Meloxicam); Therapy: ((08) 9003 7781) to Recorded   9  Oxybutynin Chloride 5 MG Oral Tablet; sig: one po bid prn urinary frequency and   urgency; Therapy: 38XPL6368 to (Last ZZ:16UMS1398)  Requested for: 40GLM1906 Ordered   10   PriLOSEC OTC 20 MG Oral Tablet Delayed Release; Therapy: (([phone]) to Recorded   11  Rosuvastatin Calcium 20 MG Oral Tablet; Therapy: ([phone]) to Recorded   12  Venlafaxine HCl  MG Oral Capsule Extended Release 24 Hour; Therapy: ([phone]) to Recorded   13  Vitamin B Complex Oral Tablet; Therapy: ([phone]) to Recorded   14  Zanaflex 2 MG Oral Capsule (TiZANidine HCl); Therapy: (Recorded:31Vcf3756) to Recorded    Allergies    1   Niacin TABS    Signatures   Electronically signed by : Libertad Mead RN; Mohit 15 2018 12:11PM EST                       (Author)

## 2018-01-23 NOTE — MISCELLANEOUS
Message   Recorded as Task   Date: 01/15/2018 12:30 PM, Created By: Ty Parkinson   Task Name: Call Back   Assigned To: Ra MIRAMONTES,TEAM   Regarding Patient: Princess Knapp, Status: Active   Comment:    Ingrid Pino - 15 Mohit 2018 12:30 PM     TASK CREATED  Received a call from [address] in regards to Oxybutynin that was ordered in they have questions about this request can they receive a call back at  please and thank you  Ingrid Pino - 15 Mohit 2018 12:30 PM     TASK EDITED   Caren Rdz - 15 Mohit 2018 12:37 PM     TASK EDITED  CLARIFIED TO DISPENSE 7 PILLS  Active Problems    1  Adenocarcinoma of prostate (185) (C61)   2  Gross hematuria (599 71) (R31 0)   3  Kidney stone (592 0) (N20 0)   4  Urinary frequency (788 41) (R35 0)    Current Meds   1  Adult Aspirin EC Low Strength 81 MG Oral Tablet Delayed Release; Therapy: (Recorded:75Hvn3755) to Recorded   2  AmLODIPine Besylate 2 5 MG Oral Tablet; Therapy: (Recorded:42Tig7914) to Recorded   3  Calcium 600-D 600-400 MG-UNIT Oral Tablet; Therapy: ([phone]) to Recorded   4  Carvedilol 6 25 MG Oral Tablet; Therapy: ((08) 7393 7781) to Recorded   5  Daily Multivitamin TABS; Therapy: (([phone] 9005 7781) to Recorded   6  Ibuprofen 200 MG Oral Tablet; Therapy: ((08 9003 7781) to Recorded   7  Meclizine HCl - 25 MG Oral Tablet; Therapy: ((08) 9003 7781) to Recorded   8  Mobic 15 MG Oral Tablet (Meloxicam); Therapy: (([phone] 6257 7781) to Recorded   9  Oxybutynin Chloride 5 MG Oral Tablet; sig: one po bid prn urinary frequency and   urgency; Therapy: [id number] to (Last OP:25SVJ7201)  Requested for: 14TPD2740 Ordered   10  Oxybutynin Chloride ER 10 MG Oral Tablet Extended Release 24 Hour; Take 1 tablet    daily; Therapy: 28FJR3801 to (Last Rx:15Jan2018)  Requested for: 35XVS6807 Ordered   11  PriLOSEC OTC 20 MG Oral Tablet Delayed Release;     Therapy: (Recorded:24Lcl0120) to Recorded   12  Rosuvastatin Calcium 20 MG Oral Tablet; Therapy: ([phone]) to Recorded   13  Venlafaxine HCl  MG Oral Capsule Extended Release 24 Hour; Therapy: ((08 9005 7781) to Recorded   14  Vitamin B Complex Oral Tablet; Therapy: ((08 9006 7781) to Recorded   15  Zanaflex 2 MG Oral Capsule (TiZANidine HCl); Therapy: (Recorded:84Nbf1356) to Recorded    Allergies    1   Niacin TABS    Signatures   Electronically signed by : Laurie Roblero RN; Mohit 15 2018 12:37PM EST                       (Author)

## 2018-01-23 NOTE — MISCELLANEOUS
Message   Recorded as Task   Date: 01/10/2018 09:01 AM, Created By: Ronn Baca   Task Name: Call Back   Assigned To: Ra MIRAMONTES,TEAM   Regarding Patient: Lala Portillo, Status: Active   Comment:    Ronn Baca - 10 Mohit 2018 9:01 AM     TASK CREATED  Patient called and has pain in his back and urine is pink  He would like a nurse to give him a call at [facility] - 10 Mohit 2018 9:30 AM     TASK EDITED  PT HAD STENT PLACED  [facility] [address]  [address] [facility] 1/19  C/O LOWER BACK PAIN WHICH EASES IF HE SITS OR RESTS  INSTRUCTED TO TAKE OTC MED FOR PAIN WHICH IS RATED 5-7 OUT OF 10  ALSO C/O DYSURIA WHICH HE HAS HAD FOR A WHILE  INFORMED BLOOD IN URINE FROM STENT  WILL GO FOR CULTURE TODAY, REQUESTED PT TO GO TO [facility] BUT PT PREFERS TO GO TO [facility]        Active Problems    1  Adenocarcinoma of prostate (185) (C61)   2  Gross hematuria (599 71) (R31 0)   3  Kidney stone (592 0) (N20 0)    Current Meds   1  Adult Aspirin EC Low Strength 81 MG Oral Tablet Delayed Release; Therapy: (Recorded:[id number]) to Recorded   2  AmLODIPine Besylate 2 5 MG Oral Tablet; Therapy: (Recorded:50Swp9708) to Recorded   3  Calcium 600-D 600-400 MG-UNIT Oral Tablet; Therapy: () to Recorded   4  Carvedilol 6 25 MG Oral Tablet; Therapy: () to Recorded   5  Daily Multivitamin TABS; Therapy: () to Recorded   6  Ibuprofen 200 MG Oral Tablet; Therapy: () to Recorded   7  Meclizine HCl - 25 MG Oral Tablet; Therapy: () to Recorded   8  Mobic 15 MG Oral Tablet (Meloxicam); Therapy: () to Recorded   9  Oxybutynin Chloride 5 MG Oral Tablet; sig: one po bid prn urinary frequency and   urgency; Therapy: [id number] to (Last QL:[id number])  Requested for: [id number] Ordered   10  PriLOSEC OTC 20 MG Oral Tablet Delayed Release; Therapy: () to Recorded   11  Rosuvastatin Calcium 20 MG Oral Tablet; Therapy: (([phone]) to Recorded   12  Venlafaxine HCl  MG Oral Capsule Extended Release 24 Hour; Therapy: (([phone]) to Recorded   13  Vitamin B Complex Oral Tablet; Therapy: (([phone]) to Recorded   14  Zanaflex 2 MG Oral Capsule (TiZANidine HCl); Therapy: (Recorded:75Yta1339) to Recorded    Allergies    1   Niacin TABS    Signatures   Electronically signed by : Chata Limon RN; Mohit 10 2018  9:30AM EST                       (Author)

## 2018-01-25 ENCOUNTER — HOSPITAL ENCOUNTER (OUTPATIENT)
Dept: RADIOLOGY | Facility: HOSPITAL | Age: 78
Discharge: HOME | End: 2018-01-25
Attending: UROLOGY
Payer: MEDICARE

## 2018-01-25 ENCOUNTER — TELEPHONE (OUTPATIENT)
Dept: UROLOGY | Facility: MEDICAL CENTER | Age: 78
End: 2018-01-25

## 2018-01-25 DIAGNOSIS — N20.0 CALCULUS OF KIDNEY: ICD-10-CM

## 2018-01-25 PROCEDURE — 74018 RADEX ABDOMEN 1 VIEW: CPT

## 2018-01-25 NOTE — TELEPHONE ENCOUNTER
I spoke to the patients wife José Miguel Crane and confirmed that Margarito's appointment with Dr Penny Curran is 2/1/18 @ 9:30AM

## 2018-01-29 ENCOUNTER — TELEPHONE (OUTPATIENT)
Dept: UROLOGY | Facility: AMBULATORY SURGERY CENTER | Age: 78
End: 2018-01-29

## 2018-01-29 DIAGNOSIS — N39.0 URINARY TRACT INFECTION WITHOUT HEMATURIA, SITE UNSPECIFIED: Primary | ICD-10-CM

## 2018-01-29 NOTE — TELEPHONE ENCOUNTER
Spoke to wife  Pt had a lot of dysuria, urine darker along with freq and urgency  Denies fever  Unable to sleep, increasing oxycodone to 2 tabs  Will go for culture to at [facility] Appt for 2/1 for poss stent removal  Doesn't know which Lab on [address]  Will fax to both and direct to Dr Vashti Harrell for additional orders

## 2018-02-01 ENCOUNTER — OFFICE VISIT (OUTPATIENT)
Dept: UROLOGY | Facility: MEDICAL CENTER | Age: 78
End: 2018-02-01
Payer: MEDICARE

## 2018-02-01 VITALS
DIASTOLIC BLOOD PRESSURE: 80 MMHG | SYSTOLIC BLOOD PRESSURE: 138 MMHG | BODY MASS INDEX: 31.18 KG/M2 | WEIGHT: 194 LBS | HEIGHT: 66 IN

## 2018-02-01 DIAGNOSIS — N20.1 RIGHT URETERAL CALCULUS: Primary | ICD-10-CM

## 2018-02-01 LAB — POST-VOID RESIDUAL VOLUME, ML POC: 0 ML

## 2018-02-01 PROCEDURE — 52310 CYSTOSCOPY AND TREATMENT: CPT | Performed by: UROLOGY

## 2018-02-01 PROCEDURE — 99214 OFFICE O/P EST MOD 30 MIN: CPT | Performed by: UROLOGY

## 2018-02-01 RX ORDER — OXYCODONE HYDROCHLORIDE AND ACETAMINOPHEN 5; 325 MG/1; MG/1
1 TABLET ORAL EVERY 4 HOURS PRN
Qty: 20 TABLET | Refills: 0 | Status: SHIPPED | OUTPATIENT
Start: 2018-02-01 | End: 2018-02-11

## 2018-02-01 NOTE — PROGRESS NOTES
Assessment/Plan:  1  Right ureteral calculus-the patient is wife and I discussed the persistence of the right ureteral stone  I explained that the 1st ureteroscopic attempt failed to access the stone and a stent was left in place  Because of stent related discomfort and pain the 2nd ureteroscopy was moved up and that procedure was also very difficult  I was however able to access the stone and fragment a good portion of it  Some of it washed out during the procedure and some of it floated up in a conglomerate with a blood clot into the more proximal urinary tract but was unable to be chased  KUB now reveals a calculus about the stone with of the ureteral stent on KUB  This may actually be a small cluster of stones  Inasmuch as the stone is the same with his the stent the patient was offered either repeat ureteroscopic extraction or lithotripsy, ESWL, or removal of the stent for spontaneous passage  Stent was removed and the patient will be monitored with serial KUB is and clinical status  The patient met Dr Mynor Palomares who was apprised of his circumstances and will be available should any problems take place  2  History of prostate cancer status post radiation therapy  No evidence of disease at this time  No problem-specific Assessment & Plan notes found for this encounter  Diagnoses and all orders for this visit:    Right ureteral calculus  -     POCT Measure PVR  -     XR abdomen 1 view kub; Future  -     Comprehensive metabolic panel; Future  -     Urine culture; Future  -     oxyCODONE-acetaminophen (PERCOCET) 5-325 mg per tablet; Take 1 tablet by mouth every 4 (four) hours as needed for moderate pain for up to 10 days Max Daily Amount: 6 tablets          Subjective:      Patient ID: Altagracia Ramirez is a 68 y o  male  HPI 51-year-old male with a history of IMRT for prostate cancer with no evidence of disease recurrence    The patient has bilateral nephrolithiasis and recently presented with a right ureteral calculus in the mid ureter  This was not able to be accessed ureteroscopically and a stent was left in placed  A 2nd attempt ureteroscopy took place and laser lithotripsy did take place however this was not completed because of poor visualization  Follow-up abdominal KUB reveals a calculus about the with of the ureteral stent  The patient complains of urinary urgency frequency urge incontinence and overall discomfort from the stent  The patient will undergo stent removal today in follow-up as outlined  The following portions of the patient's history were reviewed and updated as appropriate: allergies, current medications, past family history, past medical history, past social history, past surgical history and problem list     Review of Systems   Constitutional: Positive for activity change  HENT: Negative  Eyes: Negative  Respiratory: Negative  Cardiovascular: Negative  Gastrointestinal: Negative  Genitourinary: Positive for dysuria, frequency, penile pain and urgency  Musculoskeletal: Negative  Psychiatric/Behavioral: Negative  Objective:     Physical Exam   Constitutional: He is oriented to person, place, and time  He appears well-developed and well-nourished  HENT:   Head: Atraumatic  Eyes: EOM are normal    Neck: Neck supple  Pulmonary/Chest: Breath sounds normal    Abdominal: Bowel sounds are normal    Genitourinary: Penis normal    Neurological: He is oriented to person, place, and time  Psychiatric: He has a normal mood and affect   His behavior is normal  Judgment and thought content normal          Cystoscopy  Date/Time: 2/1/2018 11:09 AM  Performed by: Ruby Vaughn  Authorized by: Ruby Vaughn     Procedure details: simple removal of a foreign body, stone, or stent    Patient tolerance: Patient tolerated the procedure well with no immediate complications

## 2018-02-09 ENCOUNTER — TELEPHONE (OUTPATIENT)
Dept: UROLOGY | Facility: AMBULATORY SURGERY CENTER | Age: 78
End: 2018-02-09

## 2018-02-09 NOTE — TELEPHONE ENCOUNTER
Patient of Dr Roosevelt Lema  Wife called  Wants nurse to call her   has no control over his bladder

## 2018-02-09 NOTE — TELEPHONE ENCOUNTER
Spoke to Patient's spouse who stated he has no bladder control  Offered appt with Dr Malika Haskins this afternoon patient declined  Stated he will wait and see

## 2018-02-19 NOTE — TELEPHONE ENCOUNTER
Patients wife called joyce asking to speak with a nurse can she receive a call back please and thank you

## 2018-02-20 ENCOUNTER — OFFICE VISIT (OUTPATIENT)
Dept: UROLOGY | Facility: MEDICAL CENTER | Age: 78
End: 2018-02-20
Payer: MEDICARE

## 2018-02-20 VITALS
WEIGHT: 196 LBS | SYSTOLIC BLOOD PRESSURE: 120 MMHG | BODY MASS INDEX: 31.5 KG/M2 | DIASTOLIC BLOOD PRESSURE: 86 MMHG | HEIGHT: 66 IN

## 2018-02-20 DIAGNOSIS — R35.0 FREQUENCY OF URINATION: Primary | ICD-10-CM

## 2018-02-20 LAB
SL AMB  POCT GLUCOSE, UA: NORMAL
SL AMB LEUKOCYTE ESTERASE,UA: NORMAL
SL AMB POCT BILIRUBIN,UA: NORMAL
SL AMB POCT BLOOD,UA: NORMAL
SL AMB POCT CLARITY,UA: CLEAR
SL AMB POCT COLOR,UA: YELLOW
SL AMB POCT KETONES,UA: NORMAL
SL AMB POCT NITRITE,UA: NORMAL
SL AMB POCT PH,UA: 5
SL AMB POCT SPECIFIC GRAVITY,UA: 1.03
SL AMB POCT URINE PROTEIN: 30
SL AMB POCT UROBILINOGEN: 0.2

## 2018-02-20 PROCEDURE — 81003 URINALYSIS AUTO W/O SCOPE: CPT | Performed by: NURSE PRACTITIONER

## 2018-02-20 PROCEDURE — 99214 OFFICE O/P EST MOD 30 MIN: CPT | Performed by: NURSE PRACTITIONER

## 2018-02-20 RX ORDER — IBUPROFEN 400 MG/1
400 TABLET ORAL
COMMUNITY
End: 2018-03-19 | Stop reason: SDUPTHER

## 2018-02-20 RX ORDER — ROSUVASTATIN CALCIUM 40 MG/1
20 TABLET, COATED ORAL
COMMUNITY
Start: 2017-10-27 | End: 2018-04-16

## 2018-02-20 RX ORDER — AMLODIPINE BESYLATE 2.5 MG/1
5 TABLET ORAL DAILY
COMMUNITY

## 2018-02-20 RX ORDER — ASPIRIN 81 MG/1
81 TABLET ORAL DAILY
COMMUNITY

## 2018-02-20 RX ORDER — ACETAMINOPHEN 325 MG/1
650 TABLET ORAL EVERY 4 HOURS PRN
COMMUNITY
Start: 2014-08-05

## 2018-02-20 RX ORDER — TIZANIDINE HYDROCHLORIDE 2 MG/1
2 CAPSULE, GELATIN COATED ORAL 3 TIMES DAILY
COMMUNITY
End: 2019-06-26

## 2018-02-20 RX ORDER — LORAZEPAM 0.5 MG/1
0.5 TABLET ORAL
COMMUNITY
Start: 2014-08-05 | End: 2018-04-16

## 2018-02-20 RX ORDER — IBUPROFEN 200 MG
TABLET ORAL AS NEEDED
COMMUNITY

## 2018-02-20 RX ORDER — CEPHALEXIN 500 MG/1
500 TABLET ORAL EVERY 6 HOURS
Refills: 0 | COMMUNITY
Start: 2018-01-08 | End: 2018-03-19

## 2018-02-20 RX ORDER — OMEPRAZOLE 40 MG/1
CAPSULE, DELAYED RELEASE ORAL
COMMUNITY
Start: 2018-04-24

## 2018-02-20 RX ORDER — TRAMADOL HYDROCHLORIDE 50 MG/1
TABLET ORAL EVERY 6 HOURS
COMMUNITY
Start: 2015-08-05 | End: 2018-04-16

## 2018-02-20 RX ORDER — MELOXICAM 15 MG/1
15 TABLET ORAL
COMMUNITY
Start: 2013-06-14 | End: 2018-04-16

## 2018-03-16 ENCOUNTER — TRANSCRIBE ORDERS (OUTPATIENT)
Dept: RADIOLOGY | Facility: HOSPITAL | Age: 78
End: 2018-03-16

## 2018-03-16 ENCOUNTER — APPOINTMENT (OUTPATIENT)
Dept: LAB | Facility: HOSPITAL | Age: 78
End: 2018-03-16
Attending: UROLOGY
Payer: MEDICARE

## 2018-03-16 ENCOUNTER — HOSPITAL ENCOUNTER (OUTPATIENT)
Dept: RADIOLOGY | Facility: HOSPITAL | Age: 78
Discharge: HOME | End: 2018-03-16
Attending: UROLOGY
Payer: MEDICARE

## 2018-03-16 DIAGNOSIS — N20.1 RIGHT URETERAL CALCULUS: ICD-10-CM

## 2018-03-16 LAB
ALBUMIN SERPL BCP-MCNC: 3.8 G/DL (ref 3.5–5)
ALP SERPL-CCNC: 67 U/L (ref 46–116)
ALT SERPL W P-5'-P-CCNC: 38 U/L (ref 12–78)
ANION GAP SERPL CALCULATED.3IONS-SCNC: 4 MMOL/L (ref 4–13)
AST SERPL W P-5'-P-CCNC: 35 U/L (ref 5–45)
BILIRUB SERPL-MCNC: 0.6 MG/DL (ref 0.2–1)
BUN SERPL-MCNC: 18 MG/DL (ref 5–25)
CALCIUM SERPL-MCNC: 9.2 MG/DL (ref 8.3–10.1)
CHLORIDE SERPL-SCNC: 105 MMOL/L (ref 100–108)
CO2 SERPL-SCNC: 31 MMOL/L (ref 21–32)
CREAT SERPL-MCNC: 0.99 MG/DL (ref 0.6–1.3)
GFR SERPL CREATININE-BSD FRML MDRD: 73 ML/MIN/1.73SQ M
GLUCOSE SERPL-MCNC: 113 MG/DL (ref 65–140)
POTASSIUM SERPL-SCNC: 4.6 MMOL/L (ref 3.5–5.3)
PROT SERPL-MCNC: 7.6 G/DL (ref 6.4–8.2)
SODIUM SERPL-SCNC: 140 MMOL/L (ref 136–145)

## 2018-03-16 PROCEDURE — 87086 URINE CULTURE/COLONY COUNT: CPT

## 2018-03-16 PROCEDURE — 36415 COLL VENOUS BLD VENIPUNCTURE: CPT

## 2018-03-16 PROCEDURE — 80053 COMPREHEN METABOLIC PANEL: CPT

## 2018-03-16 PROCEDURE — 74018 RADEX ABDOMEN 1 VIEW: CPT

## 2018-03-18 LAB — BACTERIA UR CULT: NORMAL

## 2018-03-19 ENCOUNTER — OFFICE VISIT (OUTPATIENT)
Dept: UROLOGY | Facility: MEDICAL CENTER | Age: 78
End: 2018-03-19
Payer: MEDICARE

## 2018-03-19 VITALS
HEIGHT: 66 IN | SYSTOLIC BLOOD PRESSURE: 122 MMHG | BODY MASS INDEX: 31.34 KG/M2 | WEIGHT: 195 LBS | DIASTOLIC BLOOD PRESSURE: 86 MMHG

## 2018-03-19 DIAGNOSIS — N20.0 CALCULUS OF KIDNEY: Primary | ICD-10-CM

## 2018-03-19 DIAGNOSIS — C61 PROSTATE CANCER (HCC): ICD-10-CM

## 2018-03-19 DIAGNOSIS — N39.41 URGE INCONTINENCE: ICD-10-CM

## 2018-03-19 PROBLEM — R10.9 RIGHT FLANK PAIN: Status: RESOLVED | Noted: 2017-12-24 | Resolved: 2018-03-19

## 2018-03-19 LAB
SL AMB  POCT GLUCOSE, UA: ABNORMAL
SL AMB LEUKOCYTE ESTERASE,UA: ABNORMAL
SL AMB POCT BILIRUBIN,UA: ABNORMAL
SL AMB POCT BLOOD,UA: ABNORMAL
SL AMB POCT CLARITY,UA: CLEAR
SL AMB POCT COLOR,UA: YELLOW
SL AMB POCT KETONES,UA: ABNORMAL
SL AMB POCT NITRITE,UA: ABNORMAL
SL AMB POCT PH,UA: 5.5
SL AMB POCT SPECIFIC GRAVITY,UA: 1.02
SL AMB POCT URINE PROTEIN: ABNORMAL
SL AMB POCT UROBILINOGEN: 0.2

## 2018-03-19 PROCEDURE — 51798 US URINE CAPACITY MEASURE: CPT | Performed by: UROLOGY

## 2018-03-19 PROCEDURE — 99214 OFFICE O/P EST MOD 30 MIN: CPT | Performed by: UROLOGY

## 2018-03-19 PROCEDURE — 81003 URINALYSIS AUTO W/O SCOPE: CPT | Performed by: UROLOGY

## 2018-03-19 NOTE — LETTER
March 19, 2018     Sandra Shah MD  [address]    Patient: Kristine Rodriguez   YOB: 1940   Date of Visit: 3/19/2018       Dear Dr Charles Jones: Thank you for referring Kristine Rodriguez to me for evaluation  Below are my notes for this consultation  If you have questions, please do not hesitate to call me  I look forward to following your patient along with you  Sincerely,        Tian Pham MD        CC: No Recipients  Tian Pham MD  3/19/2018 11:46 AM  Sign at close encounter  H&P Exam - Urology   Kristine Rodriguez 68 y o  male MRN: [id number]  Unit/Bed#:  Encounter: [id number]    Assessment/Plan     Assessment:  1   6 mm right renal calculus-radiopaque, proximal migration after ureteroscopy and laser lithotripsy, now for ESWL-right sized  2  Urge incontinence-the patient has a history of radiation therapy for prostate cancer 6 years ago  He did   have a  degree of urge incontinence immediately after radiation therapy but notes that after the 1st cystoscopy and stent placement for the right ureteral stone back in December of 2017 the patient developed urgency and urge incontinence that was much worse and was exacerbated by the ureteral stent that was in place but continues now despite stent removal   He has had no urinary infection that may be a reason for this but did react rather negatively towards the previous right ureteral stent which is exacerbated his urgency and urge incontinence  Despite attempts at using alpha blockade with Flomax as well as anticholinergics with both did trip an XL and trospium the patient is urgency and urge incontinence is still severe  The patient was given Myrbetriq 50 mg p  o  daily as samples in attempt to control his urgency and urge incontinence  PVR bladder scan today was 120 cc  After better it does not help him then consideration towards Botox intravesical E will be made    3   Adenocarcinoma of the prostate, almost 6 years status post radiation therapy     Reviewing his prostate biopsy which was performed 08/07/2012 he had a Rosey pattern score 6 (3+ 3) in the left lateral apex comprising 30% of the core   Plan:  1  Right renal ESWL  2   Myrbetriq 50 mg p o  daily given his samples    History of Present Illness   HPI:  Phil Velazco is a 68 y o  male who presents with a history of adenocarcinoma of the prostate treated with IMRT approximately 6 years ago  In December of 2017 the patient presented with right renal colic secondary to a mid ureteral 6 mm calculus  The patient initially underwent cystoscopy and right ureteral stenting however the stent bothered him quite a bit with regard to urgency and frequency with urge incontinence prompting early return to the operating room  With much difficulty the stone was accessed using a ureteral scope and laser lithotripsy applied  Large portion of the stone migrated back into the renal pelvis and the patient now presents for right ESWL  He continues to have urgency and urge incontinence failing to respond adequately to oxybutynin XL as well as trospium  He will try Myrbetriq 50 mg in the this does not work will be considered for intravesical Botox       Review of Systems    Historical Information   Past Medical History:   Diagnosis Date    Arthritis     Cancer [facility])     Cardiac disease     Coronary artery disease     Hyperlipidemia     Hypertension     Kidney stone     Prostate CA [facility])      Past Surgical History:   Procedure Laterality Date    MA CYSTO/URETERO W/LITHOTRIPSY &INDWELL STENT INSRT Right 12/25/2017    Procedure: CYSTOSCOPY URETEROSCOPY WITH LITHOTRIPSY HOLMIUM LASER, RETROGRADE PYELOGRAM AND INSERTION STENT URETERAL;  Surgeon: Fara Mcgraw MD;  [facility];  Service: Urology    MA CYSTO/URETERO W/LITHOTRIPSY &INDWELL STENT INSRT Right 1/19/2018    Procedure: CYSTOSCOPY URETEROSCOPIC STONE EXTRACTION  WITH LITHOTRIPSY HOLMIUM LASER, RETROGRADE PYELOGRAM AND INSERTION STENT URETERAL;  Surgeon: Magalys Arechiga MD;  Location: [facility] OR;  Service: Urology     Social History   History   Alcohol Use    1 2 oz/week    1 Glasses of wine, 1 Cans of beer per week     Comment: occasionally     History   Drug Use No     History   Smoking Status    Current Some Day Smoker    Types: Cigars   Smokeless Tobacco    Current User     Comment: declined     Family History: History reviewed  No pertinent family history  Meds/Allergies   all medications and allergies reviewed  Allergies   Allergen Reactions    Indomethacin Other (See Comments)     Pt unsure of reaction     Lisinopril Other (See Comments)     COUGH    Niacin      Other reaction(s): Other (See Comments)  red, flushed skin    Nystatin     Rosuvastatin Other (See Comments)     MUSCLE ACHES       Objective   Vitals: Blood pressure 122/86, height 5' 6" (1 676 m), weight 88 5 kg (195 lb)  [unfilled]    Invasive Devices     Drain            Ureteral Drain/Stent Right ureter 22 Fr  84 days    Ureteral Drain/Stent Right ureter 6 Fr  58 days                Physical Exam    Lab Results: I have personally reviewed pertinent reports  Imaging: I have personally reviewed pertinent reports  EKG, Pathology, and Other Studies: I have personally reviewed pertinent reports  VTE Prophylaxis: Sequential compression device Tammy Barriga)     Code Status: [unfilled]  Advance Directive and Living Will:      Power of :    POLST:      Counseling / Coordination of Care  Total floor / unit time spent today 45 minutes  Greater than 50% of total time was spent with the patient and / or family counseling and / or coordination of care  A description of the counseling / coordination of care: Jhoan Alejandro

## 2018-03-19 NOTE — PROGRESS NOTES
H&P Exam - Urology   Darleen aSntos 68 y o  male MRN: [id number]  Unit/Bed#:  Encounter: [id number]    Assessment/Plan     Assessment:  1   6 mm right renal calculus-radiopaque, proximal migration after ureteroscopy and laser lithotripsy, now for ESWL-right sized  2  Urge incontinence-the patient has a history of radiation therapy for prostate cancer 6 years ago  He did   have a  degree of urge incontinence immediately after radiation therapy but notes that after the 1st cystoscopy and stent placement for the right ureteral stone back in December of 2017 the patient developed urgency and urge incontinence that was much worse and was exacerbated by the ureteral stent that was in place but continues now despite stent removal   He has had no urinary infection that may be a reason for this but did react rather negatively towards the previous right ureteral stent which is exacerbated his urgency and urge incontinence  Despite attempts at using alpha blockade with Flomax as well as anticholinergics with both did trip an XL and trospium the patient is urgency and urge incontinence is still severe  The patient was given Myrbetriq 50 mg p  o  daily as samples in attempt to control his urgency and urge incontinence  PVR bladder scan today was 120 cc  After better it does not help him then consideration towards Botox intravesical E will be made  3   Adenocarcinoma of the prostate, almost 6 years status post radiation therapy     Reviewing his prostate biopsy which was performed 08/07/2012 he had a Ronceverte pattern score 6 (3+ 3) in the left lateral apex comprising 30% of the core   Plan:  1  Right renal ESWL  2   Myrbetriq 50 mg p o  daily given his samples    History of Present Illness   HPI:  Darleen Santos is a 68 y o  male who presents with a history of adenocarcinoma of the prostate treated with IMRT approximately 6 years ago    In December of 2017 the patient presented with right renal colic secondary to a mid ureteral 6 mm calculus  The patient initially underwent cystoscopy and right ureteral stenting however the stent bothered him quite a bit with regard to urgency and frequency with urge incontinence prompting early return to the operating room  With much difficulty the stone was accessed using a ureteral scope and laser lithotripsy applied  Large portion of the stone migrated back into the renal pelvis and the patient now presents for right ESWL  He continues to have urgency and urge incontinence failing to respond adequately to oxybutynin XL as well as trospium  He will try Myrbetriq 50 mg in the this does not work will be considered for intravesical Botox       Review of Systems    Historical Information   Past Medical History:   Diagnosis Date    Arthritis     [facility])     Cardiac disease     Coronary artery disease     Hyperlipidemia     Hypertension     Kidney stone     Prostate CA [facility])      Past Surgical History:   Procedure Laterality Date    DC CYSTO/URETERO W/LITHOTRIPSY &INDWELL STENT INSRT Right 12/25/2017    Procedure: CYSTOSCOPY URETEROSCOPY WITH LITHOTRIPSY HOLMIUM LASER, RETROGRADE PYELOGRAM AND INSERTION STENT URETERAL;  Surgeon: Horacio Wadsworth MD;  Location:  MAIN OR;  Service: Urology    DC CYSTO/URETERO W/LITHOTRIPSY &INDWELL STENT INSRT Right 1/19/2018    Procedure: CYSTOSCOPY URETEROSCOPIC STONE EXTRACTION  WITH LITHOTRIPSY HOLMIUM LASER, RETROGRADE PYELOGRAM AND INSERTION STENT URETERAL;  Surgeon: Jazmyne Benoit MD;  Location: [facility];  Service: Urology     Social History   History   Alcohol Use    1 2 oz/week    1 Glasses of wine, 1 Cans of beer per week     Comment: occasionally     History   Drug Use No     History   Smoking Status    Current Some Day Smoker    Types: Cigars   Smokeless Tobacco    Current User     Comment: declined     Family History: History reviewed  No pertinent family history      Meds/Allergies   all medications and allergies reviewed  Allergies   Allergen Reactions    Indomethacin Other (See Comments)     Pt unsure of reaction     Lisinopril Other (See Comments)     COUGH    Niacin      Other reaction(s): Other (See Comments)  red, flushed skin    Nystatin     Rosuvastatin Other (See Comments)     MUSCLE ACHES       Objective   Vitals: Blood pressure 122/86, height 5' 6" (1 676 m), weight 88 5 kg (195 lb)  [unfilled]    Invasive Devices     Drain            Ureteral Drain/Stent Right ureter 22 Fr  84 days    Ureteral Drain/Stent Right ureter 6 Fr  58 days                Physical Exam    Lab Results: I have personally reviewed pertinent reports  Imaging: I have personally reviewed pertinent reports  EKG, Pathology, and Other Studies: I have personally reviewed pertinent reports  VTE Prophylaxis: Sequential compression device Adebayo Mcpherson)     Code Status: @[facility]CODESTAS[facility]@  Advance Directive and Living Will:      Power of :    POLST:      Counseling / Coordination of Care  Total floor / unit time spent today 45 minutes  Greater than 50% of total time was spent with the patient and / or family counseling and / or coordination of care  A description of the counseling / coordination of care: Dara Soulier

## 2018-03-19 NOTE — PATIENT INSTRUCTIONS
Lithotripsy   WHAT YOU NEED TO KNOW:   What do I need to know about lithotripsy? Lithotripsy is a procedure that uses sound waves to break up stones in the kidney, ureter, or bladder  The stone pieces then pass out of your body through your urine  How do I prepare for a lithotripsy? Your healthcare provider will talk to you about how to prepare for the procedure  He may tell you not to eat or drink anything after midnight on the day of your surgery  He will tell you what medicines to take or not take on the day of your procedure  You may need to stop taking any medicines that thin your blood 1 week or more before your procedure  These medicines include aspirin, ibuprofen, and anticoagulants  What will happen during lithotripsy? You may be given medicine to keep you relaxed and free from pain during the procedure  You may instead be given general anesthesia to keep you asleep and free from pain during the procedure  X-rays or an ultrasound are used to find the kidney stone  You may lie on a cushion filled with water or sit in a bath of warm water  High-energy sound waves are aimed at your kidney stone  The sound waves break the stone into tiny pieces  You will pass these pieces after a few days when you urinate  A stent (tube) may be put into your kidney or ureter through your bladder or back  The stent helps the pieces of stone pass out of your body  What will happen after a lithotripsy? You may have blood in your urine for 1 to 2 days  You may also have bruising and discomfort in your back or abdomen  You may have pain whenever you pass pieces of your kidney stone  This may happen over a few weeks  What are the risks of a lithotripsy? You may develop bleeding around your kidney or get a kidney infection  The pieces of stone may block the flow of urine from your kidney  You may need another lithotripsy, or other procedure, if pieces of stone are left in your body   You may develop a stomach or intestine ulcer  Your kidney may not work correctly after the procedure  Your kidney may stop working completely  This can be life-threatening  CARE AGREEMENT:   You have the right to help plan your care  Learn about your health condition and how it may be treated  Discuss treatment options with your caregivers to decide what care you want to receive  You always have the right to refuse treatment  The above information is an  only  It is not intended as medical advice for individual conditions or treatments  Talk to your doctor, nurse or pharmacist before following any medical regimen to see if it is safe and effective for you  © 2017 [employer] Information is for End User's use only and may not be sold, redistributed or otherwise used for commercial purposes  All illustrations and images included in CareNotes® are the copyrighted property of A D A M , Inc  or Denzel Leong

## 2018-04-13 ENCOUNTER — ANESTHESIA EVENT (OUTPATIENT)
Dept: PERIOP | Facility: HOSPITAL | Age: 78
End: 2018-04-13
Payer: MEDICARE

## 2018-04-13 RX ORDER — SODIUM CHLORIDE 9 MG/ML
125 INJECTION, SOLUTION INTRAVENOUS CONTINUOUS
Status: CANCELLED | OUTPATIENT
Start: 2018-04-26

## 2018-04-16 ENCOUNTER — TELEPHONE (OUTPATIENT)
Dept: UROLOGY | Facility: AMBULATORY SURGERY CENTER | Age: 78
End: 2018-04-16

## 2018-04-16 ENCOUNTER — APPOINTMENT (OUTPATIENT)
Dept: LAB | Facility: HOSPITAL | Age: 78
End: 2018-04-16
Attending: UROLOGY
Payer: MEDICARE

## 2018-04-16 ENCOUNTER — APPOINTMENT (OUTPATIENT)
Dept: PREADMISSION TESTING | Facility: HOSPITAL | Age: 78
End: 2018-04-16
Payer: MEDICARE

## 2018-04-16 DIAGNOSIS — N39.41 URGE INCONTINENCE: ICD-10-CM

## 2018-04-16 DIAGNOSIS — N20.0 KIDNEY STONE: ICD-10-CM

## 2018-04-16 LAB
ANION GAP SERPL CALCULATED.3IONS-SCNC: 8 MMOL/L (ref 4–13)
BASOPHILS # BLD AUTO: 0.03 THOUSANDS/ΜL (ref 0–0.1)
BASOPHILS NFR BLD AUTO: 1 % (ref 0–1)
BUN SERPL-MCNC: 15 MG/DL (ref 5–25)
CALCIUM SERPL-MCNC: 8.8 MG/DL
CHLORIDE SERPL-SCNC: 105 MMOL/L (ref 100–108)
CO2 SERPL-SCNC: 29 MMOL/L (ref 21–32)
CREAT SERPL-MCNC: 1 MG/DL (ref 0.6–1.3)
EOSINOPHIL # BLD AUTO: 0.15 THOUSAND/ΜL (ref 0–0.61)
EOSINOPHIL NFR BLD AUTO: 2 % (ref 0–6)
ERYTHROCYTE [DISTWIDTH] IN BLOOD BY AUTOMATED COUNT: 13.8 % (ref 11.6–15.1)
GFR SERPL CREATININE-BSD FRML MDRD: 72 ML/MIN/1.73SQ M
GLUCOSE SERPL-MCNC: 118 MG/DL (ref 65–140)
HCT VFR BLD AUTO: 41.2 % (ref 36.5–49.3)
HGB BLD-MCNC: 14.3 G/DL (ref 12–17)
LYMPHOCYTES # BLD AUTO: 1.65 THOUSANDS/ΜL (ref 0.6–4.47)
LYMPHOCYTES NFR BLD AUTO: 27 % (ref 14–44)
MCH RBC QN AUTO: 33.5 PG (ref 26.8–34.3)
MCHC RBC AUTO-ENTMCNC: 34.7 G/DL (ref 31.4–37.4)
MCV RBC AUTO: 97 FL (ref 82–98)
MONOCYTES # BLD AUTO: 0.56 THOUSAND/ΜL (ref 0.17–1.22)
MONOCYTES NFR BLD AUTO: 9 % (ref 4–12)
NEUTROPHILS # BLD AUTO: 3.78 THOUSANDS/ΜL (ref 1.85–7.62)
NEUTS SEG NFR BLD AUTO: 61 % (ref 43–75)
NRBC BLD AUTO-RTO: 0 /100 WBCS
PLATELET # BLD AUTO: 161 THOUSANDS/UL (ref 149–390)
PMV BLD AUTO: 9.9 FL (ref 8.9–12.7)
POTASSIUM SERPL-SCNC: 4 MMOL/L (ref 3.5–5.3)
RBC # BLD AUTO: 4.27 MILLION/UL (ref 3.88–5.62)
SODIUM SERPL-SCNC: 142 MMOL/L (ref 136–145)
WBC # BLD AUTO: 6.17 THOUSAND/UL (ref 4.31–10.16)

## 2018-04-16 PROCEDURE — 87086 URINE CULTURE/COLONY COUNT: CPT

## 2018-04-16 PROCEDURE — 85025 COMPLETE CBC W/AUTO DIFF WBC: CPT

## 2018-04-16 PROCEDURE — 36415 COLL VENOUS BLD VENIPUNCTURE: CPT

## 2018-04-16 PROCEDURE — 80048 BASIC METABOLIC PNL TOTAL CA: CPT

## 2018-04-16 RX ORDER — VENLAFAXINE 75 MG/1
75 TABLET ORAL DAILY
COMMUNITY
End: 2021-06-14 | Stop reason: ALTCHOICE

## 2018-04-16 RX ORDER — LIDOCAINE 50 MG/G
1 PATCH TOPICAL DAILY PRN
COMMUNITY

## 2018-04-16 RX ORDER — ROSUVASTATIN CALCIUM 20 MG/1
40 TABLET, COATED ORAL EVERY EVENING
COMMUNITY

## 2018-04-16 RX ORDER — ACETAMINOPHEN 500 MG
500 TABLET ORAL EVERY 6 HOURS PRN
Status: ON HOLD | COMMUNITY
End: 2019-07-12

## 2018-04-16 NOTE — ANESTHESIA PREPROCEDURE EVALUATION
Review of Systems/Medical History  Patient summary reviewed  Chart reviewed  No history of anesthetic complications     Cardiovascular  Hyperlipidemia, Hypertension on > 1 medication, No past MI , CAD, CAD status: 1VD, No history of CABG, Cardiac stents > 1 year No history of percutaneous transluminal coronary angioplasty, No angina ,    Pulmonary  Smoker cigar smoker  , Tobacco cessation counseling given , Shortness of breath,        GI/Hepatic  Negative GI/hepatic ROS          Kidney stones, Prostatic disorder, history of prostate cancer       Endo/Other  Negative endo/other ROS      GYN       Hematology   Musculoskeletal    Arthritis     Neurology  Negative neurology ROS      Psychology   Depression , being treated for depression,              Physical Exam    Airway    Mallampati score: II  TM Distance: >3 FB  Neck ROM: full     Dental   lower dentures and upper dentures,     Cardiovascular  Rhythm: regular, Rate: normal, Cardiovascular exam normal    Pulmonary  Pulmonary exam normal Breath sounds clear to auscultation,     Other Findings        Anesthesia Plan  ASA Score- 3     Anesthesia Type- general with ASA Monitors  Additional Monitors:   Airway Plan:         Plan Factors-Patient not instructed to abstain from smoking on day of procedure  Patient did not smoke on day of surgery  Induction- intravenous  Postoperative Plan- Plan for postoperative opioid use  Informed Consent- Anesthetic plan and risks discussed with patient

## 2018-04-16 NOTE — TELEPHONE ENCOUNTER
Spoke to wife, forgetfulness not an adverse reaction listed for Myrbetriq  If okay with Dr Aleyda Rouse would like refill sent for 30 day supply

## 2018-04-16 NOTE — TELEPHONE ENCOUNTER
Spoke with wife, She wants to know if  Myrbetriq can make patient more forgetful? And if there is something else to prescribe   Please call her back

## 2018-04-16 NOTE — PRE-PROCEDURE INSTRUCTIONS
Pre-Surgery Instructions:   Medication Instructions    acetaminophen (TYLENOL) 500 mg tablet Patient was instructed by Physician and understands   amLODIPine (NORVASC) 2 5 mg tablet Patient was instructed by Physician and understands   aspirin (ADULT ASPIRIN EC LOW STRENGTH) 81 mg EC tablet Patient was instructed by Physician and understands   b complex vitamins capsule Patient was instructed by Physician and understands   CALCIUM CARBONATE-VITAMIN D PO Patient was instructed by Physician and understands   carvedilol (COREG) 6 25 mg tablet Patient was instructed by Physician and understands   ibuprofen (MOTRIN) 200 mg tablet Patient was instructed by Physician and understands   lidocaine (LIDODERM) 5 % Patient was instructed by Physician and understands   meclizine (ANTIVERT) 25 mg tablet Patient was instructed by Physician and understands   Mirabegron ER 50 MG TB24 Patient was instructed by Physician and understands   Multiple Vitamins-Minerals (MULTIVITAMIN ADULT PO) Patient was instructed by Physician and understands   rosuvastatin (CRESTOR) 20 MG tablet Patient was instructed by Physician and understands   TiZANidine (ZANAFLEX) 2 MG capsule Patient was instructed by Physician and understands   venlafaxine (EFFEXOR) 75 mg tablet Patient was instructed by Physician and understands   venlafaxine (EFFEXOR-XR) 150 mg 24 hr capsule Patient was instructed by Physician and understands  Pt instructed by Dr Celina Wilkins to take carvedilol*with a sip of water the morning of surgery  Pt given/reviewed [facility] preop instructions and chlorhexadine soap   Pt to hold asa/NSAIDS/vitamins/herbal supplements one week before surgery or as per Dr Megan Koehler

## 2018-04-17 ENCOUNTER — TELEPHONE (OUTPATIENT)
Dept: UROLOGY | Facility: MEDICAL CENTER | Age: 78
End: 2018-04-17

## 2018-04-17 LAB — BACTERIA UR CULT: NORMAL

## 2018-04-26 ENCOUNTER — APPOINTMENT (EMERGENCY)
Dept: RADIOLOGY | Facility: HOSPITAL | Age: 78
End: 2018-04-26
Payer: MEDICARE

## 2018-04-26 ENCOUNTER — APPOINTMENT (OUTPATIENT)
Dept: RADIOLOGY | Facility: HOSPITAL | Age: 78
End: 2018-04-26
Payer: MEDICARE

## 2018-04-26 ENCOUNTER — HOSPITAL ENCOUNTER (OUTPATIENT)
Facility: HOSPITAL | Age: 78
Setting detail: OUTPATIENT SURGERY
Discharge: HOME | End: 2018-04-26
Attending: UROLOGY | Admitting: UROLOGY
Payer: MEDICARE

## 2018-04-26 ENCOUNTER — ANESTHESIA (OUTPATIENT)
Dept: PERIOP | Facility: HOSPITAL | Age: 78
End: 2018-04-26
Payer: MEDICARE

## 2018-04-26 ENCOUNTER — HOSPITAL ENCOUNTER (EMERGENCY)
Facility: HOSPITAL | Age: 78
Discharge: HOME | End: 2018-04-27
Attending: EMERGENCY MEDICINE | Admitting: EMERGENCY MEDICINE
Payer: MEDICARE

## 2018-04-26 VITALS
TEMPERATURE: 98 F | HEART RATE: 90 BPM | OXYGEN SATURATION: 96 % | HEIGHT: 66 IN | BODY MASS INDEX: 32.3 KG/M2 | RESPIRATION RATE: 14 BRPM | SYSTOLIC BLOOD PRESSURE: 159 MMHG | WEIGHT: 201 LBS | DIASTOLIC BLOOD PRESSURE: 73 MMHG

## 2018-04-26 VITALS
HEART RATE: 82 BPM | WEIGHT: 200 LBS | SYSTOLIC BLOOD PRESSURE: 155 MMHG | DIASTOLIC BLOOD PRESSURE: 79 MMHG | OXYGEN SATURATION: 95 % | RESPIRATION RATE: 18 BRPM | TEMPERATURE: 98.3 F | BODY MASS INDEX: 32.28 KG/M2

## 2018-04-26 DIAGNOSIS — N20.0 NEPHROLITHIASIS: Primary | ICD-10-CM

## 2018-04-26 PROBLEM — Z85.46 HISTORY OF PROSTATE CANCER: Status: ACTIVE | Noted: 2017-12-25

## 2018-04-26 LAB
ALBUMIN SERPL BCP-MCNC: 3.8 G/DL (ref 3.5–5)
ALP SERPL-CCNC: 63 U/L (ref 46–116)
ALT SERPL W P-5'-P-CCNC: 28 U/L (ref 12–78)
ANION GAP SERPL CALCULATED.3IONS-SCNC: 8 MMOL/L (ref 4–13)
AST SERPL W P-5'-P-CCNC: 23 U/L (ref 5–45)
BACTERIA UR QL AUTO: ABNORMAL /HPF
BASOPHILS # BLD AUTO: 0 THOUSANDS/ΜL (ref 0–0.1)
BASOPHILS NFR BLD AUTO: 0 % (ref 0–1)
BILIRUB SERPL-MCNC: 0.36 MG/DL (ref 0.2–1)
BILIRUB UR QL STRIP: ABNORMAL
BUN SERPL-MCNC: 22 MG/DL (ref 5–25)
CALCIUM SERPL-MCNC: 8.8 MG/DL (ref 8.3–10.1)
CHLORIDE SERPL-SCNC: 108 MMOL/L (ref 100–108)
CLARITY UR: ABNORMAL
CO2 SERPL-SCNC: 24 MMOL/L (ref 21–32)
COLOR UR: ABNORMAL
COLOR, POC: NORMAL
CREAT SERPL-MCNC: 1.25 MG/DL (ref 0.6–1.3)
EOSINOPHIL # BLD AUTO: 0 THOUSAND/ΜL (ref 0–0.61)
EOSINOPHIL NFR BLD AUTO: 0 % (ref 0–6)
ERYTHROCYTE [DISTWIDTH] IN BLOOD BY AUTOMATED COUNT: 13.5 % (ref 11.6–15.1)
GFR SERPL CREATININE-BSD FRML MDRD: 55 ML/MIN/1.73SQ M
GLUCOSE SERPL-MCNC: 197 MG/DL (ref 65–140)
GLUCOSE UR STRIP-MCNC: NEGATIVE MG/DL
HCT VFR BLD AUTO: 40.2 % (ref 36.5–49.3)
HGB BLD-MCNC: 13.9 G/DL (ref 12–17)
HGB UR QL STRIP.AUTO: ABNORMAL
HYALINE CASTS #/AREA URNS LPF: ABNORMAL /LPF
KETONES UR STRIP-MCNC: ABNORMAL MG/DL
LEUKOCYTE ESTERASE UR QL STRIP: ABNORMAL
LIPASE SERPL-CCNC: 84 U/L (ref 73–393)
LYMPHOCYTES # BLD AUTO: 0.66 THOUSANDS/ΜL (ref 0.6–4.47)
LYMPHOCYTES NFR BLD AUTO: 6 % (ref 14–44)
MCH RBC QN AUTO: 32.6 PG (ref 26.8–34.3)
MCHC RBC AUTO-ENTMCNC: 34.6 G/DL (ref 31.4–37.4)
MCV RBC AUTO: 94 FL (ref 82–98)
MONOCYTES # BLD AUTO: 0.86 THOUSAND/ΜL (ref 0.17–1.22)
MONOCYTES NFR BLD AUTO: 8 % (ref 4–12)
NEUTROPHILS # BLD AUTO: 8.75 THOUSANDS/ΜL (ref 1.85–7.62)
NEUTS SEG NFR BLD AUTO: 86 % (ref 43–75)
NITRITE UR QL STRIP: NEGATIVE
NON-SQ EPI CELLS URNS QL MICRO: ABNORMAL /HPF
NRBC BLD AUTO-RTO: 0 /100 WBCS
PH UR STRIP.AUTO: 5.5 [PH] (ref 4.5–8)
PLATELET # BLD AUTO: 179 THOUSANDS/UL (ref 149–390)
PMV BLD AUTO: 9.7 FL (ref 8.9–12.7)
POTASSIUM SERPL-SCNC: 4 MMOL/L (ref 3.5–5.3)
PROT SERPL-MCNC: 7.1 G/DL (ref 6.4–8.2)
PROT UR STRIP-MCNC: ABNORMAL MG/DL
RBC # BLD AUTO: 4.27 MILLION/UL (ref 3.88–5.62)
RBC #/AREA URNS AUTO: ABNORMAL /HPF
SODIUM SERPL-SCNC: 140 MMOL/L (ref 136–145)
SP GR UR STRIP.AUTO: 1.02 (ref 1–1.03)
UROBILINOGEN UR QL STRIP.AUTO: 0.2 E.U./DL
WBC # BLD AUTO: 10.29 THOUSAND/UL (ref 4.31–10.16)
WBC #/AREA URNS AUTO: ABNORMAL /HPF

## 2018-04-26 PROCEDURE — 96375 TX/PRO/DX INJ NEW DRUG ADDON: CPT

## 2018-04-26 PROCEDURE — 74018 RADEX ABDOMEN 1 VIEW: CPT

## 2018-04-26 PROCEDURE — 81002 URINALYSIS NONAUTO W/O SCOPE: CPT | Performed by: EMERGENCY MEDICINE

## 2018-04-26 PROCEDURE — 81001 URINALYSIS AUTO W/SCOPE: CPT

## 2018-04-26 PROCEDURE — 36415 COLL VENOUS BLD VENIPUNCTURE: CPT | Performed by: EMERGENCY MEDICINE

## 2018-04-26 PROCEDURE — 96374 THER/PROPH/DIAG INJ IV PUSH: CPT

## 2018-04-26 PROCEDURE — 80053 COMPREHEN METABOLIC PANEL: CPT | Performed by: EMERGENCY MEDICINE

## 2018-04-26 PROCEDURE — 85025 COMPLETE CBC W/AUTO DIFF WBC: CPT | Performed by: EMERGENCY MEDICINE

## 2018-04-26 PROCEDURE — 50590 FRAGMENTING OF KIDNEY STONE: CPT | Performed by: UROLOGY

## 2018-04-26 PROCEDURE — 83690 ASSAY OF LIPASE: CPT | Performed by: EMERGENCY MEDICINE

## 2018-04-26 PROCEDURE — 74176 CT ABD & PELVIS W/O CONTRAST: CPT

## 2018-04-26 PROCEDURE — 87086 URINE CULTURE/COLONY COUNT: CPT

## 2018-04-26 RX ORDER — KETOROLAC TROMETHAMINE 30 MG/ML
15 INJECTION, SOLUTION INTRAMUSCULAR; INTRAVENOUS ONCE
Status: COMPLETED | OUTPATIENT
Start: 2018-04-26 | End: 2018-04-26

## 2018-04-26 RX ORDER — ONDANSETRON 2 MG/ML
4 INJECTION INTRAMUSCULAR; INTRAVENOUS ONCE
Status: COMPLETED | OUTPATIENT
Start: 2018-04-26 | End: 2018-04-26

## 2018-04-26 RX ORDER — SODIUM CHLORIDE 9 MG/ML
125 INJECTION, SOLUTION INTRAVENOUS CONTINUOUS
Status: DISCONTINUED | OUTPATIENT
Start: 2018-04-26 | End: 2018-04-26 | Stop reason: HOSPADM

## 2018-04-26 RX ORDER — FENTANYL CITRATE/PF 50 MCG/ML
50 SYRINGE (ML) INJECTION
Status: DISCONTINUED | OUTPATIENT
Start: 2018-04-26 | End: 2018-04-26 | Stop reason: HOSPADM

## 2018-04-26 RX ORDER — FENTANYL CITRATE 50 UG/ML
INJECTION, SOLUTION INTRAMUSCULAR; INTRAVENOUS AS NEEDED
Status: DISCONTINUED | OUTPATIENT
Start: 2018-04-26 | End: 2018-04-26 | Stop reason: SURG

## 2018-04-26 RX ORDER — MEPERIDINE HYDROCHLORIDE 50 MG/ML
12.5 INJECTION INTRAMUSCULAR; INTRAVENOUS; SUBCUTANEOUS AS NEEDED
Status: DISCONTINUED | OUTPATIENT
Start: 2018-04-26 | End: 2018-04-26 | Stop reason: HOSPADM

## 2018-04-26 RX ORDER — ONDANSETRON 2 MG/ML
INJECTION INTRAMUSCULAR; INTRAVENOUS AS NEEDED
Status: DISCONTINUED | OUTPATIENT
Start: 2018-04-26 | End: 2018-04-26 | Stop reason: SURG

## 2018-04-26 RX ORDER — ALBUTEROL SULFATE 2.5 MG/3ML
2.5 SOLUTION RESPIRATORY (INHALATION) ONCE AS NEEDED
Status: DISCONTINUED | OUTPATIENT
Start: 2018-04-26 | End: 2018-04-26 | Stop reason: HOSPADM

## 2018-04-26 RX ORDER — OXYCODONE HYDROCHLORIDE AND ACETAMINOPHEN 5; 325 MG/1; MG/1
1 TABLET ORAL EVERY 4 HOURS PRN
Qty: 6 TABLET | Refills: 0 | Status: SHIPPED | OUTPATIENT
Start: 2018-04-26 | End: 2018-05-06

## 2018-04-26 RX ORDER — OXYCODONE HYDROCHLORIDE AND ACETAMINOPHEN 5; 325 MG/1; MG/1
1 TABLET ORAL EVERY 4 HOURS PRN
Status: DISCONTINUED | OUTPATIENT
Start: 2018-04-26 | End: 2018-04-26 | Stop reason: HOSPADM

## 2018-04-26 RX ORDER — ONDANSETRON 4 MG/1
4 TABLET, FILM COATED ORAL EVERY 6 HOURS
Qty: 12 TABLET | Refills: 0 | Status: SHIPPED | OUTPATIENT
Start: 2018-04-26 | End: 2020-12-18 | Stop reason: ALTCHOICE

## 2018-04-26 RX ORDER — NAPROXEN 500 MG/1
500 TABLET ORAL 2 TIMES DAILY WITH MEALS
Qty: 14 TABLET | Refills: 0 | Status: SHIPPED | OUTPATIENT
Start: 2018-04-26 | End: 2019-06-26

## 2018-04-26 RX ORDER — DEXAMETHASONE SODIUM PHOSPHATE 4 MG/ML
INJECTION, SOLUTION INTRA-ARTICULAR; INTRALESIONAL; INTRAMUSCULAR; INTRAVENOUS; SOFT TISSUE AS NEEDED
Status: DISCONTINUED | OUTPATIENT
Start: 2018-04-26 | End: 2018-04-26 | Stop reason: SURG

## 2018-04-26 RX ADMIN — ONDANSETRON 4 MG: 2 INJECTION INTRAMUSCULAR; INTRAVENOUS at 22:54

## 2018-04-26 RX ADMIN — FENTANYL CITRATE 25 MCG: 50 INJECTION, SOLUTION INTRAMUSCULAR; INTRAVENOUS at 07:53

## 2018-04-26 RX ADMIN — ONDANSETRON HYDROCHLORIDE 4 MG: 2 INJECTION, SOLUTION INTRAVENOUS at 08:09

## 2018-04-26 RX ADMIN — DEXAMETHASONE SODIUM PHOSPHATE 4 MG: 4 INJECTION, SOLUTION INTRAMUSCULAR; INTRAVENOUS at 08:09

## 2018-04-26 RX ADMIN — SODIUM CHLORIDE 125 ML/HR: 0.9 INJECTION, SOLUTION INTRAVENOUS at 09:13

## 2018-04-26 RX ADMIN — LIDOCAINE HYDROCHLORIDE 100 MG: 20 INJECTION, SOLUTION INTRAVENOUS at 08:05

## 2018-04-26 RX ADMIN — KETOROLAC TROMETHAMINE 15 MG: 30 INJECTION, SOLUTION INTRAMUSCULAR at 22:55

## 2018-04-26 RX ADMIN — Medication 2000 MG: at 08:06

## 2018-04-26 RX ADMIN — FENTANYL CITRATE 25 MCG: 50 INJECTION, SOLUTION INTRAMUSCULAR; INTRAVENOUS at 08:09

## 2018-04-26 RX ADMIN — SODIUM CHLORIDE 125 ML/HR: 0.9 INJECTION, SOLUTION INTRAVENOUS at 06:30

## 2018-04-26 NOTE — OP NOTE
OPERATIVE REPORT  PATIENT NAME: Zehra Millan    :  1940  MRN: [id number]  Pt Location: [facility] OR ROOM 02    SURGERY DATE: 2018    Surgeon(s) and Role:     * Brenda Leon MD - Primary    Preop Diagnosis:  Kidney stone [N20 0]    Post-Op Diagnosis Codes:     * Kidney stone [N20 0]    Procedure(s) (LRB):  RIGHT LITHROTRIPSY EXTRACORPORAL SHOCKWAVE (ESWL) (Right)    Specimen(s):  * No specimens in log *    Estimated Blood Loss:   Minimal    Drains:  Ureteral Drain/Stent Right ureter 22 Fr  (Active)   Number of days: 122       Ureteral Drain/Stent Right ureter 6 Fr  (Active)   Number of days: 97       Anesthesia Type:   General    Operative Indications:  Kidney stone [N20 0]  Right    Operative Findings:  6 mm right renal calculus with marginal blurring and volume expansion consistent with adequate fragmentation status post ESWL    Complications:   None    Procedure and Technique:  The patient was seen in the holding room and agreed that he had a right renal calculus in that he was here for right ESWL  Patient agreed to the procedure was taken to the operating room where he was identified by the surgeon and placed on the lithotripsy table  Fluoroscopy was used to identify a radiopaque 6 mm calculus overlying the right renal shadow and after the patient was under adequate anesthesia ESWL took place utilizing progressively increasing energy levels and delivering a total of 3000 shocks with marginal blurring and volume expansion seen fluoroscopically with regard to the stone indicative of fragmentation  At the conclusion of the procedure the patient was in good condition without complication and was transferred to the recovery room     I was present for the entire procedure and A qualified resident physician was not available    Patient Disposition:  PACU     SIGNATURE: Brenda Leon MD  DATE: 2018  TIME: 8:02 AM

## 2018-04-26 NOTE — ADDENDUM NOTE
Addendum  created 04/26/18 1012 by Daniel Rahman CRNA    Anesthesia Intra LDAs edited, LDA properties accepted

## 2018-04-26 NOTE — ANESTHESIA POSTPROCEDURE EVALUATION
Post-Op Assessment Note      CV Status:  Stable    Mental Status:  Alert and awake    Hydration Status:  Euvolemic    PONV Controlled:  Controlled    Airway Patency:  Patent    Post Op Vitals Reviewed: Yes          Staff: Anesthesiologist           /85 (04/26/18 0931)    Temp      Pulse 88 (04/26/18 0931)   Resp 14 (04/26/18 0931)    SpO2 98 % (04/26/18 0931)

## 2018-04-26 NOTE — INTERIM OP NOTE
RIGHT LITHROTRIPSY EXTRACORPORAL SHOCKWAVE (ESWL)  Postoperative Note  PATIENT NAME: Amanda Cotto  : 1940  MRN: [id number]  [facility] ROOM 02    Surgery Date: 2018    Preop Diagnosis:  Kidney stone [N20 0]    Post-Op Diagnosis Codes:     * Kidney stone [N20 0]    Procedure(s) (LRB):  RIGHT LITHROTRIPSY EXTRACORPORAL SHOCKWAVE (ESWL) (Right)    Surgeon(s) and Role:     * Saul Sood MD - Primary    Specimens:  * No specimens in log *    Estimated Blood Loss:   Minimal    Anesthesia Type:   General     Findings:    Radiopaque 6 mm right renal calculus with marginal blurring and volume expansion consistent with adequate fragmentation status post ESWL  Complications:   None    SIGNATURE: Saul Sood MD   DATE: 2018   TIME: 8:01 AM

## 2018-04-26 NOTE — DISCHARGE SUMMARY
Discharge Summary - Elo Kern 68 y o  male MRN: [id number]    Unit/Bed#: OR POOL Encounter: [id number]    Admission Date: 4/26/2018     Discharge Date:  04/26/2018    Admitting Diagnosis: Kidney stone [N20 0] right    Admitting Provider: Stephon Devlin MD    Discharging Provider: Stephon Devlin MD    Primary Care Physician at Discharge: Trina Child [phone]     HPI:This is a 68 y o  old male presented with Kidney stone [N20 0],  The patient has a history of adenocarcinoma of the prostate as well as significant urge incontinence and overactive bladder refractory to most pharmacologic agents  Patient has a history of renal stone disease with renal colic in the past and has a residual radiopaque right renal stone and nonobstructive position  Patient now presented for right ESWL  Allergies   Allergen Reactions    Indomethacin Other (See Comments)     Pt unsure of reaction     Lisinopril Other (See Comments)     COUGH,,pt cant remember    Niacin      Other reaction(s): Other (See Comments)  red, flushed skin    Nystatin      Pt unsure    Rosuvastatin Other (See Comments)     MUSCLE ACHES,, pt unsure of reaction       Consults   None          Procedures Performed: No orders of the defined types were placed in this encounter  Hospital Course: Tolerated hospitalization without complication discharged in good condition  Significant Findings, Care, Treatment and Services Provided:  Right ESWL    Complications:  None    Discharge Diagnosis:  Right renal stone    Condition at Discharge: good     Discharge instructions/Information to patient and family:   See after visit summary for information provided to patient and family  Provisions for Follow-Up Care:  See after visit summary for information related to follow-up care and any pertinent home health orders  Disposition: Home    Planned Readmission: No    Discharge Statement   I spent 30 minutes discharging the patient  This time was spent on the day of discharge  I had direct contact with the patient on the day of discharge  Additional documentation is required if more than 30 minutes were spent on discharge  Discharge Medications:  See after visit summary for reconciled discharge medications provided to patient and family          ?  ?

## 2018-04-26 NOTE — DISCHARGE INSTRUCTIONS
How to Strain Your Urine   American Urological Association (AUA): Kidney Stones: Basic Facts Everyone Should Know  American Urological Association (AUA)  Yamilka Montes MD  2005  Available from URL: http://dangelo org/  pdf  As accessed 2011-01-13  Iris Espinal & [place] JS : Selected Urologic Problems  In: [facility] [facility], Juan A RS, El RM, et al  Lupillo Vera's Emergency Medicine: Concepts and Clinical Practice, 7th ed  [place], [place], 2010  Ryan S & Vincent C : Specimen collection and testing  In: Nursing Procedures, 3rd ed  [address] [place], [place], 2000  [facility] and [address] [place] [address]): What I need to know about kidney stones  [facility] and [address] [place] [address])  MD Jim  2007  Available from URL: http://kidney  niddk nih gov/kudiseases/pubs/stones_ez/  As accessed 2011-01-13  Isabela SC & Prachi BG : Management of Patients with Urinary Disorders  In: [place] [address] [employer] Medical-Surgical Nursing, 10th ed  [address] [facility] [place], [place], 2004 © 2017 [facility] Information is for End User's use only and may not be sold, redistributed or otherwise used for commercial purposes  All illustrations and images included in CareNotes® are the copyrighted property of A D A M , Inc  or Denzel Leong  The above information is an  only  It is not intended as medical advice for individual conditions or treatments  Talk to your doctor, nurse or pharmacist before following any medical regimen to see if it is safe and effective for you  Kidney Stones   WHAT YOU NEED TO KNOW:   Kidney stones form in the urinary system when the water and waste in your urine are out of balance  When this happens, certain types of waste crystals separate from the urine  The crystals build up and form kidney stones   You may have 1 or more kidney stones  DISCHARGE INSTRUCTIONS:   Return to the emergency department if:   · You have vomiting that is not relieved by medicine  Contact your healthcare provider if:   · You have a fever  · You have trouble passing urine  · You see blood in your urine  · You have severe pain  · You have any questions or concerns about your condition or care  Medicines:   · NSAIDs , such as ibuprofen, help decrease swelling, pain, and fever  This medicine is available with or without a doctor's order  NSAIDs can cause stomach bleeding or kidney problems in certain people  If you take blood thinner medicine, always ask your healthcare provider if NSAIDs are safe for you  Always read the medicine label and follow directions  · Prescription medicine  may be given  Ask how to take this medicine safely  · Medicines  to balance your electrolytes may be needed  · Take your medicine as directed  Contact your healthcare provider if you think your medicine is not helping or if you have side effects  Tell him or her if you are allergic to any medicine  Keep a list of the medicines, vitamins, and herbs you take  Include the amounts, and when and why you take them  Bring the list or the pill bottles to follow-up visits  Carry your medicine list with you in case of an emergency  Follow up with your healthcare provider as directed: You may need to return for more tests  Write down your questions so you remember to ask them during your visits  Self-care:   · Drink plenty of liquids  Your healthcare provider may tell you to drink at least 8 to 12 (eight-ounce) cups of liquids each day  This helps flush out the kidney stones when you urinate  Water is the best liquid to drink  · Strain your urine every time you go to the bathroom  Urinate through a strainer or a piece of thin cloth to catch the stones  Take the stones to your healthcare provider so they can be sent to the lab for tests   This will help your healthcare providers plan the best treatment for you  · Eat a variety of healthy foods  Healthy foods include fruits, vegetables, whole-grain breads, low-fat dairy products, beans, and fish  You may need to limit how much sodium (salt) or protein you eat  Ask for information about the best foods for you  · Stay active  Your stones may pass more easily by if you stay active  Ask about the best activities for you  After you pass your kidney stones:  Once you have passed your kidney stones, your healthcare provider may  order a 24-hour urine test  Results from a 24-hour urine test will help your healthcare provider plan ways to prevent more stones from forming  If you are told to do a 24-hour test, your healthcare provider will give you more instructions  © 2017 [facility] Information is for End User's use only and may not be sold, redistributed or otherwise used for commercial purposes  All illustrations and images included in CareNotes® are the copyrighted property of A D A M , Inc  or Denzel Leong  The above information is an  only  It is not intended as medical advice for individual conditions or treatments  Talk to your doctor, nurse or pharmacist before following any medical regimen to see if it is safe and effective for you  Kidney Stones   WHAT YOU NEED TO KNOW:   Kidney stones form in the urinary system when the water and waste in your urine are out of balance  When this happens, certain types of waste crystals separate from the urine  The crystals build up and form kidney stones  You may have 1 or more kidney stones  DISCHARGE INSTRUCTIONS:   Seek care immediately:   · You have vomiting that is not relieved by medicine  Contact your healthcare provider if:   · You have a fever  · You have trouble passing urine  · You see blood in your urine  · You have severe pain      · You have any questions or concerns about your condition or care  Medicines:   · NSAIDs , such as ibuprofen, help decrease swelling, pain, and fever  This medicine is available with or without a doctor's order  NSAIDs can cause stomach bleeding or kidney problems in certain people  If you take blood thinner medicine, always ask your healthcare provider if NSAIDs are safe for you  Always read the medicine label and follow directions  · Prescription medicine  may be given  Ask how to take this medicine safely  · Medicines  to balance your electrolytes may be needed  · Take your medicine as directed  Contact your healthcare provider if you think your medicine is not helping or if you have side effects  Tell him or her if you are allergic to any medicine  Keep a list of the medicines, vitamins, and herbs you take  Include the amounts, and when and why you take them  Bring the list or the pill bottles to follow-up visits  Carry your medicine list with you in case of an emergency  Follow up with your healthcare provider as directed: You may need to return for more tests  Write down your questions so you remember to ask them during your visits  Self-care:   · Drink plenty of liquids  Your healthcare provider may tell you to drink at least 8 to 12 (eight-ounce) cups of liquids each day  This helps flush out the kidney stones when you urinate  Water is the best liquid to drink  · Strain your urine every time you go to the bathroom  Urinate through a strainer or a piece of thin cloth to catch the stones  Take the stones to your healthcare provider so they can be sent to the lab for tests  This will help your healthcare providers plan the best treatment for you  · Eat a variety of healthy foods  Healthy foods include fruits, vegetables, whole-grain breads, low-fat dairy products, beans, and fish  You may need to limit how much sodium (salt) or protein you eat  Ask for information about the best foods for you  · Stay active    Your stones may pass more easily by if you stay active  Ask about the best activities for you  After you pass your kidney stones:  Once you have passed your kidney stones, your healthcare provider may  order a 24-hour urine test  Results from a 24-hour urine test will help your healthcare provider plan ways to prevent more stones from forming  If you are told to do a 24-hour test, your healthcare provider will give you more instructions  © 2017 [address] [employer] Information is for End User's use only and may not be sold, redistributed or otherwise used for commercial purposes  All illustrations and images included in CareNotes® are the copyrighted property of A D A M , Inc  or Denzel Leong  The above information is an  only  It is not intended as medical advice for individual conditions or treatments  Talk to your doctor, nurse or pharmacist before following any medical regimen to see if it is safe and effective for you  How to Stop Smoking   WHAT YOU NEED TO KNOW:   You will improve your health and the health of others around you if you stop smoking  Your risk for heart and lung disease, cancer, stroke, heart attack, and vision problems will also decrease  You can benefit from quitting no matter how long you have smoked  DISCHARGE INSTRUCTIONS:   Prepare to stop smoking:  Nicotine is a highly addictive drug found in cigarettes  Withdrawal symptoms can happen when you stop smoking and make it hard to quit  These include anxiety, depression, irritability, trouble sleeping, and increased appetite  You increase your chances of success if you prepare to quit  · Set a quit date  Maya Meager a date that is within the next 2 weeks  Do not pick a day that you think may be stressful or busy  Write down the day or Ute it on your calender  · Tell friends and family that you plan to quit  Explain that you may have withdrawal symptoms when you try to quit  Ask them to support you   They may be able to encourage you and help reduce your stress to make it easier for you to quit  · Make a list of your reasons for quitting  Put the list somewhere you will see it every day, such as your refrigerator  You can look at the list when you have a craving  · Remove all tobacco and nicotine products from your home, car, and workplace  Also, remove anything else that will tempt you to smoke, such as lighters, matches, or ashtrays  Clean your car, home, and places at work that smell like smoke  The smell of smoke can trigger a craving  · Identify triggers that make you want to smoke  This may include activities, feelings, or people  Also write down 1 way you can deal with each of your triggers  For example, if you want to smoke as soon as you wake up, plan another activity during this time, such as exercise  · Make a plan for how you will quit  Learn about the tools that can help you quit, such as medicine, counseling, or nicotine replacement therapy  Choose at least 2 options to help you quit  Tools to help you stop smoking:   · Counseling  from a trained healthcare provider can provide you with support and skills to quit smoking  The provider will also teach you to manage your withdrawal symptoms and cravings  You may receive counseling from one counselor, in group therapy, or through phone therapy called a quit line  · Nicotine replacement therapy (NRT)  such as nicotine patches, gum, or lozenges may help reduce your nicotine cravings  You may get these without a doctor's order  Do not use e-cigarettes or smokeless tobacco in place of cigarettes or to help you quit  They still contain nicotine  · Prescription medicines  such as nasal sprays or nicotine inhalers may help reduce your withdrawal symptoms  Other medicines may also be used to reduce your urge to smoke  Ask your healthcare provider about these medicines   You may need to start certain medicines 2 weeks before your quit date for them to work well  · Hypnosis  is a practice that helps guide you through thoughts and feelings  Hypnosis may help decrease your cravings and make you more willing to quit  · Acupuncture therapy  uses very thin needles to balance energy channels in the body  This is thought to help decrease cravings and symptoms of nicotine withdrawal      · Support groups  let you talk to others who are trying to quit or have already quit  It may be helpful to speak with others about how they quit  Manage your cravings:   · Avoid situations, people, and places that tempt you to smoke  Go to nonsmoking places, such as libraries or restaurants  Understand what tempts you and try to avoid these things  · Keep your hands busy  Hold things such as a stress ball or pen  · Put candy or toothpicks in your mouth  Keep lollipops, sugarless gum, or toothpicks with you at all times  · Do not have alcohol or caffeine  These drinks may tempt you to smoke  Drink healthy liquids such as water or juice instead  · Reward yourself when you resist your cravings  Rewards will motivate you and help you stay positive  · Do an activity that distracts you from your craving  Examples include going for a walk, exercising, or cleaning  Prevent weight gain after you quit:  You may gain a few pounds after you quit smoking  It is healthier for you to gain a few pounds than to continue to smoke  The following can help you prevent weight gain:  · Eat healthy foods  These include fruits, vegetables, whole-grain breads, low-fat dairy products, beans, lean meats, and fish  Eat healthy snacks, such as low-fat yogurt, if you get hungry between meals  · Drink water before, during, and between meals  This will make your stomach feel full and help prevent you from overeating  Ask your healthcare provider how much liquid to drink each day and which liquids are best for you  · Exercise  Take a walk or do some kind of exercise every day  Ask your healthcare provider what exercise is right for you  This may help reduce your cravings and reduce stress  For support and more information:   · Smokefree  gov  Phone: 1- 862 - 863-2574  Web Address: www smokefree  gov  © 2017 [address] [employer]  Information is for End User's use only and may not be sold, redistributed or otherwise used for commercial purposes  All illustrations and images included in CareNotes® are the copyrighted property of A D A M , Inc  or Denzel Leong  The above information is an  only  It is not intended as medical advice for individual conditions or treatments  Talk to your doctor, nurse or pharmacist before following any medical regimen to see if it is safe and effective for you

## 2018-04-27 ENCOUNTER — TELEPHONE (OUTPATIENT)
Dept: UROLOGY | Facility: MEDICAL CENTER | Age: 78
End: 2018-04-27

## 2018-04-27 PROCEDURE — 99284 EMERGENCY DEPT VISIT MOD MDM: CPT

## 2018-04-27 NOTE — TELEPHONE ENCOUNTER
Pt called service last night  Was back in ER due to severe back pain and dry heaves  CT scan clear  D/C'd home with Oxycodone, Naproxen and Zofran  Pt currently sleeping  To call office if symptoms return

## 2018-04-27 NOTE — ED PROVIDER NOTES
History  Chief Complaint   Patient presents with    Flank Pain     pt reports right flank pain s/p lithotripsy today     This is a 26-year-old male presenting to the emergency department for evaluation of flank pain  The patient has had a complicated nephrolithiasis frequently since about December of last year  At that time he had a large right ureteral stone requiring stenting  He has had some renal clicking license  This morning with Dr Filomena Elder per min, he had lithotripsy of right renal colic ill eye  He was pain free after the procedure this morning did have 1 episode of painless hematuria was otherwise been asymptomatic  This evening shortly prior to arrival, he developed left flank pain and right groin pain similar to the pain from his previous renal stones  He was not given any medications for this by his urologist and so presented again to the emergency department for further evaluation and treatment  He has had some nausea and dry heaving without  Vomiting  He denies fevers chills dysuria lightheadedness chest pain palpitations or shortness of breath  Prior to Admission Medications   Prescriptions Last Dose Informant Patient Reported? Taking?    CALCIUM CARBONATE-VITAMIN D PO 4/26/2018 at Unknown time  Yes Yes   Sig: Take 1 tablet by mouth daily   Mirabegron ER 50 MG TB24 4/26/2018 at Unknown time  No Yes   Sig: Take 1 tablet (50 mg total) by mouth daily   Multiple Vitamins-Minerals (MULTIVITAMIN ADULT PO) 4/26/2018 at Unknown time  Yes Yes   Sig: Take by mouth daily     TiZANidine (ZANAFLEX) 2 MG capsule 4/26/2018 at Unknown time  Yes Yes   Sig: Take 2 mg by mouth 3 (three) times a day     acetaminophen (TYLENOL) 325 mg tablet 4/26/2018 at Unknown time Self Yes Yes   Sig: Take 650 mg by mouth every 4 (four) hours as needed     acetaminophen (TYLENOL) 500 mg tablet 4/26/2018 at Unknown time  Yes Yes   Sig: Take 500 mg by mouth every 6 (six) hours as needed for mild pain   amLODIPine (NORVASC) 2 5 mg tablet 4/26/2018 at Unknown time  Yes Yes   Sig: Take 2 5 mg by mouth daily     aspirin (ADULT ASPIRIN EC LOW STRENGTH) 81 mg EC tablet 4/26/2018 at Unknown time Self Yes Yes   Sig: Take 325 mg by mouth     b complex vitamins capsule 4/26/2018 at Unknown time  Yes Yes   Sig: Take 1 capsule by mouth daily   carvedilol (COREG) 6 25 mg tablet 4/26/2018 at Unknown time  Yes Yes   Sig: Take 12 5 mg by mouth 2 (two) times a day with meals   ibuprofen (MOTRIN) 200 mg tablet 4/26/2018 at Unknown time  Yes Yes   Sig: Take by mouth as needed     lidocaine (LIDODERM) 5 % 4/26/2018 at Unknown time  Yes Yes   Sig: Place 1 patch on the skin daily Remove & Discard patch within 12 hours or as directed by MD   meclizine (ANTIVERT) 25 mg tablet 4/26/2018 at Unknown time  Yes Yes   Sig: Take 32 mg by mouth 3 (three) times a day as needed for dizziness Spouse states patient has been taking this medication for last 2 weeks   omeprazole (PriLOSEC) 40 MG capsule 4/26/2018 at Unknown time Self Yes Yes   Sig: daily       rosuvastatin (CRESTOR) 20 MG tablet 4/26/2018 at Unknown time  Yes Yes   Sig: Take 20 mg by mouth daily   venlafaxine (EFFEXOR) 75 mg tablet 4/26/2018 at Unknown time  Yes Yes   Sig: Take 75 mg by mouth 2 (two) times a day Pt takes along with 150mg cap once a day for total dose of 225mg once a day   venlafaxine (EFFEXOR-XR) 150 mg 24 hr capsule 4/26/2018 at Unknown time  Yes Yes   Sig: Take 150 mg by mouth daily        Facility-Administered Medications: None       Past Medical History:   Diagnosis Date    Anesthesia     "once in awhile takes awhile to wake up:    Arthritis     Balance problems     Cancer [facility])     Cardiac disease     Coronary artery disease     Dry mouth     Full dentures     History of coronary artery stent placement     x2, "approx 20 yrs ago or more"    History of radiation therapy     "20 treatments for prostate" "approx 10yrs ago"    [place] (hard of hearing)     Hyperlipidemia  Hypertension     Kidney stone     Neck pain     Prostate CA (HCC)     Risk for falls     Shortness of breath     occas    Swelling of ankle     occas both ankles    Urinary frequency     Urinary urgency     Vertigo     Wears glasses        Past Surgical History:   Procedure Laterality Date    CAROTID ENDARTERECTOMY Left     CHOLECYSTECTOMY      COLONOSCOPY      CORONARY STENT PLACEMENT      x2    CYSTOSCOPY      CYSTOSCOPY W/ URETERAL STENT REMOVAL      DENTAL SURGERY      HEMORRHOID SURGERY      SD CYSTO/URETERO W/LITHOTRIPSY &INDWELL STENT INSRT Right 12/25/2017    Procedure: CYSTOSCOPY URETEROSCOPY WITH LITHOTRIPSY HOLMIUM LASER, RETROGRADE PYELOGRAM AND INSERTION STENT URETERAL;  Surgeon: Dae Perez MD;  [facility];  Service: Urology    SD CYSTO/URETERO W/LITHOTRIPSY &INDWELL STENT INSRT Right 1/19/2018    Procedure: CYSTOSCOPY URETEROSCOPIC STONE EXTRACTION  WITH LITHOTRIPSY HOLMIUM LASER, RETROGRADE PYELOGRAM AND INSERTION STENT URETERAL;  Surgeon: Daryl Berg MD;  [facility];  Service: Urology    SD FRAGMENT KIDNEY STONE/ ESWL Right 4/26/2018    Procedure: RIGHT LITHROTRIPSY EXTRACORPORAL SHOCKWAVE (ESWL); Surgeon: Daryl Berg MD;  [facility];  Service: Urology    ROTATOR CUFF REPAIR Right        History reviewed  No pertinent family history  I have reviewed and agree with the history as documented  Social History   Substance Use Topics    Smoking status: Current Some Day Smoker     Years: 57 00     Types: Cigars    Smokeless tobacco: Never Used      Comment: declined    Alcohol use 0 6 oz/week     1 Cans of beer per week      Comment: occasionally        Review of Systems   Constitutional: Negative for appetite change, chills, fatigue and fever  HENT: Negative for sneezing and sore throat  Eyes: Negative for visual disturbance     Respiratory: Negative for cough, choking, chest tightness, shortness of breath and wheezing  Cardiovascular: Negative for chest pain and palpitations  Gastrointestinal: Positive for abdominal pain  Negative for constipation, diarrhea, nausea and vomiting  Genitourinary: Positive for dysuria and flank pain  Negative for difficulty urinating  Neurological: Negative for dizziness, weakness, light-headedness, numbness and headaches  All other systems reviewed and are negative  Physical Exam  ED Triage Vitals   Temperature Pulse Respirations Blood Pressure SpO2   04/26/18 2154 04/26/18 2154 04/26/18 2154 04/26/18 2154 04/26/18 2154   98 3 °F (36 8 °C) 94 18 (!) 172/77 94 %      Temp Source Heart Rate Source Patient Position - Orthostatic VS BP Location FiO2 (%)   04/26/18 2154 04/26/18 2210 04/26/18 2210 04/26/18 2210 --   Oral Monitor Sitting Left arm       Pain Score       04/26/18 2154       6           Orthostatic Vital Signs  Vitals:    04/26/18 2154 04/26/18 2210 04/26/18 2245 04/26/18 2300   BP: (!) 172/77 149/74 153/83 155/79   Pulse: 94 89 87 82   Patient Position - Orthostatic VS:  Sitting Lying        Physical Exam   Constitutional: He is oriented to person, place, and time  He appears well-developed and well-nourished  No distress  HENT:   Head: Normocephalic and atraumatic  Mouth/Throat: Oropharynx is clear and moist    Eyes: EOM are normal  Pupils are equal, round, and reactive to light  Neck: No JVD present  No tracheal deviation present  Cardiovascular: Normal rate, regular rhythm, normal heart sounds and intact distal pulses  Exam reveals no gallop and no friction rub  No murmur heard  Pulmonary/Chest: Effort normal and breath sounds normal  No respiratory distress  He has no wheezes  He has no rales  Abdominal: Soft  Bowel sounds are normal  He exhibits no distension  There is tenderness in the right lower quadrant  There is CVA tenderness  There is no rebound and no guarding  Neurological: He is alert and oriented to person, place, and time   No cranial nerve deficit  He exhibits normal muscle tone  Skin: Skin is warm and dry  He is not diaphoretic  No pallor  Psychiatric: He has a normal mood and affect  His behavior is normal    Nursing note and vitals reviewed  ED Medications  Medications   ondansetron (ZOFRAN) injection 4 mg (4 mg Intravenous Given 4/26/18 2254)   ketorolac (TORADOL) injection 15 mg (15 mg Intravenous Given 4/26/18 2255)       Diagnostic Studies  Results Reviewed     Procedure Component Value Units Date/Time    Urine culture [[id number]] Collected:  04/26/18 2234    Lab Status:  Final result Specimen:  Urine from Urine, Other Updated:  04/28/18 0900     Urine Culture No Growth <1000 cfu/mL    Urine Microscopic [[id number]]  (Abnormal) Collected:  04/26/18 2234    Lab Status:  Final result Specimen:  Urine from Urine, Other Updated:  04/26/18 2248     RBC, UA Innumerable (A) /hpf      WBC, UA 10-20 (A) /hpf      Epithelial Cells None Seen /hpf      Bacteria, UA None Seen /hpf      Hyaline Casts, UA None Seen /lpf     Lipase [[id number]]  (Normal) Collected:  04/26/18 2211    Lab Status:  Final result Specimen:  Blood from Arm, Right Updated:  04/26/18 2236     Lipase 84 u/L     Comprehensive metabolic panel [[id number]]  (Abnormal) Collected:  04/26/18 2211    Lab Status:  Final result Specimen:  Blood from Arm, Right Updated:  04/26/18 2236     Sodium 140 mmol/L      Potassium 4 0 mmol/L      Chloride 108 mmol/L      CO2 24 mmol/L      Anion Gap 8 mmol/L      BUN 22 mg/dL      Creatinine 1 25 mg/dL      Glucose 197 (H) mg/dL      Calcium 8 8 mg/dL      AST 23 U/L      ALT 28 U/L      Alkaline Phosphatase 63 U/L      Total Protein 7 1 g/dL      Albumin 3 8 g/dL      Total Bilirubin 0 36 mg/dL      eGFR 55 ml/min/1 73sq m     Narrative:         National Kidney Disease Education Program recommendations are as follows:  GFR calculation is accurate only with a steady state creatinine  Chronic Kidney disease less than 60 ml/min/1 73 sq  meters  Kidney failure less than 15 ml/min/1 73 sq  meters  POCT urinalysis dipstick [[id number]]  (Normal) Resulted:  04/26/18 2232    Lab Status:  Final result Updated:  04/26/18 2232     Color, UA bridget    CBC and differential [[id number]]  (Abnormal) Collected:  04/26/18 2211    Lab Status:  Final result Specimen:  Blood from Arm, Right Updated:  04/26/18 2231     WBC 10 29 (H) Thousand/uL      RBC 4 27 Million/uL      Hemoglobin 13 9 g/dL      Hematocrit 40 2 %      MCV 94 fL      MCH 32 6 pg      MCHC 34 6 g/dL      RDW 13 5 %      MPV 9 7 fL      Platelets 425 Thousands/uL      nRBC 0 /100 WBCs      Neutrophils Relative 86 (H) %      Lymphocytes Relative 6 (L) %      Monocytes Relative 8 %      Eosinophils Relative 0 %      Basophils Relative 0 %      Neutrophils Absolute 8 75 (H) Thousands/µL      Lymphocytes Absolute 0 66 Thousands/µL      Monocytes Absolute 0 86 Thousand/µL      Eosinophils Absolute 0 00 Thousand/µL      Basophils Absolute 0 00 Thousands/µL     ED Urine Macroscopic [[id number]]  (Abnormal) Collected:  04/26/18 2234    Lab Status:  Final result Specimen:  Urine Updated:  04/26/18 2231     Color, UA Bridget     Clarity, UA Cloudy     pH, UA 5 5     Leukocytes, UA Trace (A)     Nitrite, UA Negative     Protein,  (2+) (A) mg/dl      Glucose, UA Negative mg/dl      Ketones, UA Trace (A) mg/dl      Urobilinogen, UA 0 2 E U /dl      Bilirubin, UA Interference- unable to analyze (A)     Blood, UA Large (A)     Specific [place], UA 1 025    Narrative:       CLINITEK RESULT                 CT renal stone study abdomen pelvis wo contrast   Final Result by Magali Potter MD (04/26 0556)         1  Moderate right hydronephrosis and perinephric congestive change  Tapered narrowing of the proximal ureter without evidence of obstructing calculus  Intraureteral hematoma not excluded  Findings may also represent recent passage of a calculus or    stone fragments     2   Nonobstructing 2 to 4 mm calculi and right renal calyces  Workstation performed: [id number]               Procedures  Procedures      Phone Consults  ED Phone Contact    ED Course           Identification of Seniors at Risk      Most Recent Value   (ISAR) Identification of Seniors at Risk   Before the illness or injury that brought you to the Emergency, did you need someone to help you on a regular basis? 1 Filed at: 04/26/2018 2157   In the last 24 hours, have you needed more help than usual?  1 Filed at: 04/26/2018 2157   Have you been hospitalized for one or more nights during the past 6 months? 1 Filed at: 04/26/2018 2157   In general, do you see well? 1 Filed at: 04/26/2018 2157   In general, do you have serious problems with your memory? 0 Filed at: 04/26/2018 2157   Do you take more than three different medications every day? 1 Filed at: 04/26/2018 2157   ISAR Score  5 Filed at: 04/26/2018 2157                          MDM  Number of Diagnoses or Management Options  Nephrolithiasis:   Diagnosis management comments:   80-year-old male with flank pain  After lithotripsy this morning  Suspect that the nephroliths have migrated through the ureter and have now possibly become lodged causing nephrolithiasis  Check renal function, UA to evaluate for infected sone and CT renal stone study to evaluate for possible kidney stone  the patient's pain is resolved  I reviewed the case with patient's on-call urologist feels that outpatient follow-up is appropriate  Will discharge the patient pain medicines and outpatient follow-up at this time      CritCare Time    Disposition  Final diagnoses:   Nephrolithiasis     Time reflects when diagnosis was documented in both MDM as applicable and the Disposition within this note     Time User Action Codes Description Comment    4/26/2018 11:55 PM [place], [facility] [N20 0] Nephrolithiasis       ED Disposition     ED Disposition Condition Comment    Discharge  [facility] discharge to home/self care  Condition at discharge: Stable        Follow-up Information     Follow up With Specialties Details Why Contact Info    Saul Sood MD Urology Call  Zach Hatch  [address]  [phone]          Discharge Medication List as of 4/26/2018 11:56 PM      CONTINUE these medications which have NOT CHANGED    Details   !! acetaminophen (TYLENOL) 325 mg tablet Take 650 mg by mouth every 4 (four) hours as needed  , Starting Tue 8/5/2014, Historical Med      !! acetaminophen (TYLENOL) 500 mg tablet Take 500 mg by mouth every 6 (six) hours as needed for mild pain, Historical Med      amLODIPine (NORVASC) 2 5 mg tablet Take 2 5 mg by mouth daily  , Historical Med      aspirin (ADULT ASPIRIN EC LOW STRENGTH) 81 mg EC tablet Take 325 mg by mouth  , Historical Med      b complex vitamins capsule Take 1 capsule by mouth daily, Historical Med      CALCIUM CARBONATE-VITAMIN D PO Take 1 tablet by mouth daily, Historical Med      carvedilol (COREG) 6 25 mg tablet Take 12 5 mg by mouth 2 (two) times a day with meals, Historical Med      ibuprofen (MOTRIN) 200 mg tablet Take by mouth as needed  , Historical Med      lidocaine (LIDODERM) 5 % Place 1 patch on the skin daily Remove & Discard patch within 12 hours or as directed by MD, Historical Med      meclizine (ANTIVERT) 25 mg tablet Take 32 mg by mouth 3 (three) times a day as needed for dizziness Spouse states patient has been taking this medication for last 2 weeks, Historical Med      Mirabegron ER 50 MG TB24 Take 1 tablet (50 mg total) by mouth daily, Starting Mon 4/16/2018, Normal      Multiple Vitamins-Minerals (MULTIVITAMIN ADULT PO) Take by mouth daily  , Starting Thu 6/7/2012, Historical Med      omeprazole (PriLOSEC) 40 MG capsule daily    , Historical Med      rosuvastatin (CRESTOR) 20 MG tablet Take 20 mg by mouth daily, Historical Med      TiZANidine (ZANAFLEX) 2 MG capsule Take 2 mg by mouth 3 (three) times a day  , Historical Med      venlafaxine (EFFEXOR) 75 mg tablet Take 75 mg by mouth 2 (two) times a day Pt takes along with 150mg cap once a day for total dose of 225mg once a day, Historical Med      venlafaxine (EFFEXOR-XR) 150 mg 24 hr capsule Take 150 mg by mouth daily  , Historical Med       !! - Potential duplicate medications found  Please discuss with provider  No discharge procedures on file  ED Provider  Attending physically available and evaluated [facility]  I managed the patient along with the ED Attending      Electronically Signed by         Carmen Meza MD  04/29/18 2097

## 2018-04-27 NOTE — DISCHARGE INSTRUCTIONS
Kidney Stones   WHAT YOU NEED TO KNOW:   Kidney stones form in the urinary system when the water and waste in your urine are out of balance  When this happens, certain types of waste crystals separate from the urine  The crystals build up and form kidney stones  You may have 1 or more kidney stones  DISCHARGE INSTRUCTIONS:   Return to the emergency department if:   · You have vomiting that is not relieved by medicine  Contact your healthcare provider if:   · You have a fever  · You have trouble passing urine  · You see blood in your urine  · You have severe pain  · You have any questions or concerns about your condition or care  Medicines:   · NSAIDs , such as ibuprofen, help decrease swelling, pain, and fever  This medicine is available with or without a doctor's order  NSAIDs can cause stomach bleeding or kidney problems in certain people  If you take blood thinner medicine, always ask your healthcare provider if NSAIDs are safe for you  Always read the medicine label and follow directions  · Prescription medicine  may be given  Ask how to take this medicine safely  · Medicines  to balance your electrolytes may be needed  · Take your medicine as directed  Contact your healthcare provider if you think your medicine is not helping or if you have side effects  Tell him or her if you are allergic to any medicine  Keep a list of the medicines, vitamins, and herbs you take  Include the amounts, and when and why you take them  Bring the list or the pill bottles to follow-up visits  Carry your medicine list with you in case of an emergency  Follow up with your healthcare provider as directed: You may need to return for more tests  Write down your questions so you remember to ask them during your visits  Self-care:   · Drink plenty of liquids  Your healthcare provider may tell you to drink at least 8 to 12 (eight-ounce) cups of liquids each day   This helps flush out the kidney stones when you urinate  Water is the best liquid to drink  · Strain your urine every time you go to the bathroom  Urinate through a strainer or a piece of thin cloth to catch the stones  Take the stones to your healthcare provider so they can be sent to the lab for tests  This will help your healthcare providers plan the best treatment for you  · Eat a variety of healthy foods  Healthy foods include fruits, vegetables, whole-grain breads, low-fat dairy products, beans, and fish  You may need to limit how much sodium (salt) or protein you eat  Ask for information about the best foods for you  · Stay active  Your stones may pass more easily by if you stay active  Ask about the best activities for you  After you pass your kidney stones:  Once you have passed your kidney stones, your healthcare provider may  order a 24-hour urine test  Results from a 24-hour urine test will help your healthcare provider plan ways to prevent more stones from forming  If you are told to do a 24-hour test, your healthcare provider will give you more instructions  © 2017 [facility] Information is for End User's use only and may not be sold, redistributed or otherwise used for commercial purposes  All illustrations and images included in CareNotes® are the copyrighted property of A D A M , Inc  or Denzel Leong  The above information is an  only  It is not intended as medical advice for individual conditions or treatments  Talk to your doctor, nurse or pharmacist before following any medical regimen to see if it is safe and effective for you

## 2018-04-27 NOTE — ED ATTENDING ATTESTATION
Elyssa Johnson MD, saw and evaluated the patient  I have discussed the patient with the resident/non-physician practitioner and agree with the resident's/non-physician practitioner's findings, Plan of Care, and MDM as documented in the resident's/non-physician practitioner's note, except where noted  All available labs and Radiology studies were reviewed  At this point I agree with the current assessment done in the Emergency Department  I have conducted an independent evaluation of this patient a history and physical is as follows:      Critical Care Time  CritCare Time    Procedures     67 yo male with hx of kidney stones, lithotripsy done this morning and this afternoon c/o right sided flank pain and groin pain, nausea, dry heaving, no blood in urine  Pt with hx of bph, stents, prostate ca  No fever  vss, afebrile, lungs cta, rrr, abdomen soft nontender, left cva tenderness  Stone study, pain meds, labs, discuss with urology

## 2018-04-28 LAB — BACTERIA UR CULT: NORMAL

## 2018-04-30 ENCOUNTER — TELEPHONE (OUTPATIENT)
Dept: UROLOGY | Facility: AMBULATORY SURGERY CENTER | Age: 78
End: 2018-04-30

## 2018-04-30 DIAGNOSIS — N20.0 KIDNEY STONES: Primary | ICD-10-CM

## 2018-04-30 NOTE — TELEPHONE ENCOUNTER
Spoke with wife, she would like a KUB script added into the system so patient can get it done   Please advise

## 2018-04-30 NOTE — TELEPHONE ENCOUNTER
Pt S/P right ESWL  F/U appt 5/14  Will obtain KUB prior  Order in [facility]

## 2018-05-02 NOTE — TELEPHONE ENCOUNTER
Now that it's almost one week post-op, I called the patient  Spoke with his wife, Ezra Marroquin  Patient doing well post-operatively  As for his urgency, wife states she's only giving him one Myrbetriq 50mg every other day because he doesn't seem to be urinating much  She's trying to wean him off and see how things go  She states next follow-up office visit with Dr Andrea Philippe is 5/14/18  She will reassess then  I explained the drug prior authorization process to her  These things take time since his plan ([employer]) requires step-therapy  I spoke with HUNTER Carrillo Ph at [address] [place] [address] and he re-ran the script for a 30 day supply  Cost to patient would be $434  Prior Authorization is required as Myrbetriq is non-covered  They suggest Oxybutynin ER and Toviaz as alternatives  Patient's history remarkable to trials of Oxybutynin ER 10mg, Vesicare and Trospium; all drugs were ineffective at controlling symptoms (urinary urgency and urge incontinence )    I could call [employer] ([phone]) or attempt approval via CoverMyMeds com  BIN#:  [id number] - PCN:  [id number] - Id#: [id number]    Wife has my name and number and will call me directly if they want to pursue approval

## 2018-05-10 ENCOUNTER — HOSPITAL ENCOUNTER (OUTPATIENT)
Dept: RADIOLOGY | Facility: HOSPITAL | Age: 78
Discharge: HOME | End: 2018-05-10
Attending: UROLOGY
Payer: MEDICARE

## 2018-05-10 DIAGNOSIS — N20.0 KIDNEY STONES: ICD-10-CM

## 2018-05-10 PROCEDURE — 74018 RADEX ABDOMEN 1 VIEW: CPT

## 2018-05-14 ENCOUNTER — OFFICE VISIT (OUTPATIENT)
Dept: UROLOGY | Facility: MEDICAL CENTER | Age: 78
End: 2018-05-14
Payer: MEDICARE

## 2018-05-14 VITALS
DIASTOLIC BLOOD PRESSURE: 78 MMHG | BODY MASS INDEX: 31.39 KG/M2 | WEIGHT: 200 LBS | HEIGHT: 67 IN | SYSTOLIC BLOOD PRESSURE: 148 MMHG

## 2018-05-14 DIAGNOSIS — N20.0 NEPHROLITHIASIS: ICD-10-CM

## 2018-05-14 DIAGNOSIS — Z85.46 HISTORY OF PROSTATE CANCER: Primary | ICD-10-CM

## 2018-05-14 PROBLEM — N39.41 URGE INCONTINENCE: Status: RESOLVED | Noted: 2018-03-19 | Resolved: 2018-05-14

## 2018-05-14 PROCEDURE — 99024 POSTOP FOLLOW-UP VISIT: CPT | Performed by: UROLOGY

## 2018-05-14 NOTE — LETTER
May 14, 2018     Jose Angel Urrutia MD  [facility]  [address]    Patient: Aleida Maxwell   YOB: 1940   Date of Visit: 5/14/2018       Dear Dr Fatimah Calvo: Thank you for referring Jenny Eastman to me for evaluation  Below are my notes for this consultation  If you have questions, please do not hesitate to call me  I look forward to following your patient along with you  Sincerely,        Myrtis Shone, MD        CC: No Recipients  Myrtis Shone, MD  5/14/2018 12:10 PM  Sign at close encounter  Assessment/Plan:   1  Adenocarcinoma of the prostate treated approximately 6 years ago by radiation therapy with low PSA and no evidence of disease recurrence  Yearly PSA testing in interval history will take place with physical examinations  2   Urgency and urge incontinence  With discontinuance of the ureteral stent the patient continence has markedly improved to the point that he is voiding adequately with little or no wetting whatsoever  He denies any dysuria gross hematuria significant nocturia or weak stream   3   Renal stone disease  The patient appears to have some right lower pole renal stone material that looks smaller and less dense than prior to ESW L  No further intervention is recommended at this point  The patient will maintain a low-sodium diet with vigorous oral fluid intake  He will contact me should he have any further issues  KUB will be obtained in 1 year along with PSA and chemistry profile with uric acid level  Diagnoses and all orders for this visit:    History of prostate cancer  -     PSA Total, Diagnostic; Future  -     Comprehensive metabolic panel; Future          Subjective:     Patient ID: Aleida Maxwell is a 68 y o  male  HPI 69-year-old male with a history of adenocarcinoma of the prostate treated with radiation therapy/IMRT 6 years ago and a history of renal stone disease    The patient recently underwent placement of ureteral stent followed by attempted ureteroscopic stone fragmentation and then ESWL  He presents for discussion of results  We also discussed his adenocarcinoma of the prostate and the resolution of his urgency and urge incontinence  The patient notes that he is voiding well without dysuria frequency urgency gross hematuria or incontinence at this point  Review of Systems   Constitutional: Negative  HENT: Negative  Eyes: Negative  Respiratory: Negative  Cardiovascular: Negative  Gastrointestinal: Negative  Genitourinary: Negative  Musculoskeletal: Negative  Neurological: Negative  Psychiatric/Behavioral: Negative  Objective:     Physical Exam   Constitutional: He is oriented to person, place, and time  He appears well-developed and well-nourished  No distress  HENT:   Head: Atraumatic  Eyes: EOM are normal    Neck: Neck supple  Pulmonary/Chest: Effort normal  No respiratory distress  Abdominal: Soft  He exhibits no distension  Neurological: He is alert and oriented to person, place, and time  Psychiatric: He has a normal mood and affect   His behavior is normal  Judgment and thought content normal

## 2018-05-14 NOTE — PROGRESS NOTES
Assessment/Plan:   1  Adenocarcinoma of the prostate treated approximately 6 years ago by radiation therapy with low PSA and no evidence of disease recurrence  Yearly PSA testing in interval history will take place with physical examinations  2   Urgency and urge incontinence  With discontinuance of the ureteral stent the patient continence has markedly improved to the point that he is voiding adequately with little or no wetting whatsoever  He denies any dysuria gross hematuria significant nocturia or weak stream   3   Renal stone disease  The patient appears to have some right lower pole renal stone material that looks smaller and less dense than prior to ESW L  No further intervention is recommended at this point  The patient will maintain a low-sodium diet with vigorous oral fluid intake  He will contact me should he have any further issues  KUB will be obtained in 1 year along with PSA and chemistry profile with uric acid level  Diagnoses and all orders for this visit:    History of prostate cancer  -     PSA Total, Diagnostic; Future  -     Comprehensive metabolic panel; Future          Subjective:     Patient ID: Gil Edmonds is a 68 y o  male  HPI 59-year-old male with a history of adenocarcinoma of the prostate treated with radiation therapy/IMRT 6 years ago and a history of renal stone disease  The patient recently underwent placement of ureteral stent followed by attempted ureteroscopic stone fragmentation and then ESWL  He presents for discussion of results  We also discussed his adenocarcinoma of the prostate and the resolution of his urgency and urge incontinence  The patient notes that he is voiding well without dysuria frequency urgency gross hematuria or incontinence at this point  Review of Systems   Constitutional: Negative  HENT: Negative  Eyes: Negative  Respiratory: Negative  Cardiovascular: Negative  Gastrointestinal: Negative      Genitourinary: Negative  Musculoskeletal: Negative  Neurological: Negative  Psychiatric/Behavioral: Negative  Objective:     Physical Exam   Constitutional: He is oriented to person, place, and time  He appears well-developed and well-nourished  No distress  HENT:   Head: Atraumatic  Eyes: EOM are normal    Neck: Neck supple  Pulmonary/Chest: Effort normal  No respiratory distress  Abdominal: Soft  He exhibits no distension  Neurological: He is alert and oriented to person, place, and time  Psychiatric: He has a normal mood and affect   His behavior is normal  Judgment and thought content normal

## 2018-07-26 DIAGNOSIS — C61 MALIGNANT NEOPLASM OF PROSTATE (HCC): ICD-10-CM

## 2018-08-31 ENCOUNTER — TRANSCRIBE ORDERS (OUTPATIENT)
Dept: ADMINISTRATIVE | Facility: HOSPITAL | Age: 78
End: 2018-08-31

## 2018-08-31 DIAGNOSIS — G47.33 OSA (OBSTRUCTIVE SLEEP APNEA): Primary | ICD-10-CM

## 2018-12-06 NOTE — MISCELLANEOUS
Message   Recorded as Task   Date: 01/22/2018 08:32 AM, Created By: Wilfredo Flores   Task Name: Care Coordination   Assigned To: Ra MIRAMONTES,TEAM   Regarding Patient: Candelaria Dejesus, Status: In Progress   Comment:    Mily Hayes - 22 Jan 2018 8:32 AM     TASK CREATED  Spoke with patient's wife, she would a call back from one of the nurses regarding a KUB script and when to get it done  Please call her back her phone number is [phone]  Thank you   Temple Isai - 22 Jan 2018 10:05 AM     TASK IN PROGRESS   Temple Isai - 22 Jan 2018 12:53 PM     TASK EDITED  Per Dr Fairchild Current have pt have is KUB end of this week and schedule to see him 02/01/18     Pt notified and apt scheduled  Active Problems    1  Adenocarcinoma of prostate (185) (C61)   2  Gross hematuria (599 71) (R31 0)   3  Kidney stone (592 0) (N20 0)   4  Urinary frequency (788 41) (R35 0)    Current Meds   1  Adult Aspirin EC Low Strength 81 MG Oral Tablet Delayed Release; Therapy: (Recorded:97Fqs5412) to Recorded   2  AmLODIPine Besylate 2 5 MG Oral Tablet; Therapy: (Recorded:84Jrh5662) to Recorded   3  Calcium 600-D 600-400 MG-UNIT Oral Tablet; Therapy: (Merary Felt) to Recorded   4  Carvedilol 6 25 MG Oral Tablet; Therapy: (Merary Felt) to Recorded   5  Daily Multivitamin TABS; Therapy: (Merary Felt) to Recorded   6  Ibuprofen 200 MG Oral Tablet; Therapy: (Merary Felt) to Recorded   7  Meclizine HCl - 25 MG Oral Tablet; Therapy: (Merary Felt) to Recorded   8  Mobic 15 MG Oral Tablet (Meloxicam); Therapy: (Merary [place]) to Recorded   9  Oxybutynin Chloride 5 MG Oral Tablet; sig: one po bid prn urinary frequency and   urgency; Therapy: [id number] to (Last KW:89HGY3942)  Requested for: [id number] Ordered   10  Oxybutynin Chloride ER 10 MG Oral Tablet Extended Release 24 Hour; Take 1 tablet    daily;     Therapy: [id number] to (Last Rx:15Jan2018)  Requested for: Telephone Encounter by Delfina Veliz RN, BSN at 09/19/17 10:52 AM     Author:  Delfina Veliz RN, BSN Service:  (none) Author Type:  Registered Nurse     Filed:  09/19/17 10:54 AM Encounter Date:  9/19/2017 Status:  Signed     :  Delfina Veliz RN, BSN (Registered Nurse)            Advised patient she should continue to take her Synthroid as directed. Explained that Prolia is an injection and will not interfere with her Synthroid dose.[JH1.1M] Geovanna verbalized understanding of information given.[JH1.1T]        Revision History        User Key Date/Time User Provider Type Action    > JH1.1 09/19/17 10:54 AM Delfina Veliz RN, BSN Registered Nurse Sign    M - Manual, T - Template             [id number] Ordered   11  PriLOSEC OTC 20 MG Oral Tablet Delayed Release; Therapy: (0345 74 47 21) to Recorded   12  Rosuvastatin Calcium 20 MG Oral Tablet; Therapy: (0345 74 47 21) to Recorded   13  Venlafaxine HCl  MG Oral Capsule Extended Release 24 Hour; Therapy: (0345 74 47 21) to Recorded   14  Vitamin B Complex Oral Tablet; Therapy: (0345 74 47 21) to Recorded   15  Zanaflex 2 MG Oral Capsule (TiZANidine HCl); Therapy: (Recorded:04Hvr0980) to Recorded    Allergies    1   Niacin TABS    Signatures   Electronically signed by : Jyoti Lee, ; Jan 22 2018 12:53PM EST                       (Author)

## 2019-05-13 ENCOUNTER — TELEPHONE (OUTPATIENT)
Dept: UROLOGY | Facility: MEDICAL CENTER | Age: 79
End: 2019-05-13

## 2019-05-13 DIAGNOSIS — N20.0 KIDNEY STONE: ICD-10-CM

## 2019-05-13 DIAGNOSIS — Z85.46 PERSONAL HISTORY OF MALIGNANT NEOPLASM OF PROSTATE: Primary | ICD-10-CM

## 2019-05-21 ENCOUNTER — HOSPITAL ENCOUNTER (OUTPATIENT)
Dept: RADIOLOGY | Facility: HOSPITAL | Age: 79
Discharge: HOME | End: 2019-05-21
Attending: UROLOGY
Payer: MEDICARE

## 2019-05-21 ENCOUNTER — APPOINTMENT (OUTPATIENT)
Dept: LAB | Facility: HOSPITAL | Age: 79
End: 2019-05-21
Attending: UROLOGY
Payer: MEDICARE

## 2019-05-21 DIAGNOSIS — N20.0 KIDNEY STONE: ICD-10-CM

## 2019-05-21 DIAGNOSIS — Z85.46 PERSONAL HISTORY OF MALIGNANT NEOPLASM OF PROSTATE: ICD-10-CM

## 2019-05-21 LAB
ALBUMIN SERPL BCP-MCNC: 3.7 G/DL (ref 3.5–5)
ALP SERPL-CCNC: 59 U/L (ref 46–116)
ALT SERPL W P-5'-P-CCNC: 42 U/L (ref 12–78)
ANION GAP SERPL CALCULATED.3IONS-SCNC: 9 MMOL/L (ref 4–13)
AST SERPL W P-5'-P-CCNC: 24 U/L (ref 5–45)
BILIRUB SERPL-MCNC: 0.59 MG/DL (ref 0.2–1)
BUN SERPL-MCNC: 14 MG/DL (ref 5–25)
CALCIUM SERPL-MCNC: 9.3 MG/DL (ref 8.3–10.1)
CHLORIDE SERPL-SCNC: 106 MMOL/L (ref 100–108)
CO2 SERPL-SCNC: 28 MMOL/L (ref 21–32)
CREAT SERPL-MCNC: 0.97 MG/DL (ref 0.6–1.3)
GFR SERPL CREATININE-BSD FRML MDRD: 74 ML/MIN/1.73SQ M
GLUCOSE P FAST SERPL-MCNC: 179 MG/DL (ref 65–99)
POTASSIUM SERPL-SCNC: 4.2 MMOL/L (ref 3.5–5.3)
PROT SERPL-MCNC: 7.4 G/DL (ref 6.4–8.2)
PSA SERPL-MCNC: <0.1 NG/ML (ref 0–4)
SODIUM SERPL-SCNC: 143 MMOL/L (ref 136–145)

## 2019-05-21 PROCEDURE — 74018 RADEX ABDOMEN 1 VIEW: CPT

## 2019-05-21 PROCEDURE — 80053 COMPREHEN METABOLIC PANEL: CPT

## 2019-05-21 PROCEDURE — 84153 ASSAY OF PSA TOTAL: CPT

## 2019-05-21 PROCEDURE — 36415 COLL VENOUS BLD VENIPUNCTURE: CPT

## 2019-05-28 ENCOUNTER — TELEPHONE (OUTPATIENT)
Dept: UROLOGY | Facility: MEDICAL CENTER | Age: 79
End: 2019-05-28

## 2019-05-28 ENCOUNTER — OFFICE VISIT (OUTPATIENT)
Dept: UROLOGY | Facility: MEDICAL CENTER | Age: 79
End: 2019-05-28
Payer: MEDICARE

## 2019-05-28 VITALS
HEART RATE: 116 BPM | HEIGHT: 66 IN | DIASTOLIC BLOOD PRESSURE: 80 MMHG | WEIGHT: 205 LBS | SYSTOLIC BLOOD PRESSURE: 132 MMHG | BODY MASS INDEX: 32.95 KG/M2

## 2019-05-28 DIAGNOSIS — N47.1 PHIMOSIS: Primary | ICD-10-CM

## 2019-05-28 DIAGNOSIS — N48.1 BALANITIS: ICD-10-CM

## 2019-05-28 DIAGNOSIS — Z85.46 PERSONAL HISTORY OF MALIGNANT NEOPLASM OF PROSTATE: ICD-10-CM

## 2019-05-28 LAB
SL AMB  POCT GLUCOSE, UA: NORMAL
SL AMB LEUKOCYTE ESTERASE,UA: NORMAL
SL AMB POCT BILIRUBIN,UA: NORMAL
SL AMB POCT BLOOD,UA: NORMAL
SL AMB POCT CLARITY,UA: CLEAR
SL AMB POCT COLOR,UA: YELLOW
SL AMB POCT KETONES,UA: NORMAL
SL AMB POCT NITRITE,UA: NORMAL
SL AMB POCT PH,UA: 5.5
SL AMB POCT SPECIFIC GRAVITY,UA: 1.02
SL AMB POCT URINE PROTEIN: NORMAL
SL AMB POCT UROBILINOGEN: 0.2

## 2019-05-28 PROCEDURE — 99214 OFFICE O/P EST MOD 30 MIN: CPT | Performed by: UROLOGY

## 2019-05-28 PROCEDURE — 81003 URINALYSIS AUTO W/O SCOPE: CPT | Performed by: UROLOGY

## 2019-06-26 RX ORDER — LEVOTHYROXINE SODIUM 0.05 MG/1
50 TABLET ORAL
COMMUNITY

## 2019-06-26 RX ORDER — EZETIMIBE 10 MG/1
10 TABLET ORAL DAILY
COMMUNITY

## 2019-06-26 RX ORDER — FUROSEMIDE 20 MG/1
20 TABLET ORAL 2 TIMES DAILY
Status: ON HOLD | COMMUNITY
End: 2021-07-06 | Stop reason: ALTCHOICE

## 2019-06-26 RX ORDER — MELOXICAM 15 MG/1
15 TABLET ORAL
COMMUNITY

## 2019-06-26 NOTE — PRE-PROCEDURE INSTRUCTIONS
Pre-Surgery Instructions:   Medication Instructions    acetaminophen (TYLENOL) 325 mg tablet Instructed patient per Anesthesia Guidelines   amLODIPine (NORVASC) 2 5 mg tablet Instructed patient per Anesthesia Guidelines   aspirin (ADULT ASPIRIN EC LOW STRENGTH) 81 mg EC tablet Instructed patient per Anesthesia Guidelines   b complex vitamins capsule Instructed patient per Anesthesia Guidelines   CALCIUM CARBONATE-VITAMIN D PO Instructed patient per Anesthesia Guidelines   carvedilol (COREG) 6 25 mg tablet Instructed patient per Anesthesia Guidelines   ezetimibe (ZETIA) 10 mg tablet Instructed patient per Anesthesia Guidelines   furosemide (LASIX) 20 mg tablet Instructed patient per Anesthesia Guidelines   ibuprofen (MOTRIN) 200 mg tablet Instructed patient per Anesthesia Guidelines   levothyroxine 50 mcg tablet Instructed patient per Anesthesia Guidelines   lidocaine (LIDODERM) 5 % Instructed patient per Anesthesia Guidelines   meclizine (ANTIVERT) 25 mg tablet Instructed patient per Anesthesia Guidelines   meloxicam (MOBIC) 15 mg tablet Instructed patient per Anesthesia Guidelines   metFORMIN (GLUCOPHAGE) 500 mg tablet Instructed patient per Anesthesia Guidelines   omeprazole (PriLOSEC) 40 MG capsule Instructed patient per Anesthesia Guidelines   rosuvastatin (CRESTOR) 20 MG tablet Instructed patient per Anesthesia Guidelines   venlafaxine (EFFEXOR) 75 mg tablet Instructed patient per Anesthesia Guidelines  Instructed to take Omeprazole, Carvedilol, and Amlodipine with sip of water morning of surgery per anesthesia guidelines  No NSAIDs1 week before surgery

## 2019-07-01 PROCEDURE — NC001 PR NO CHARGE: Performed by: UROLOGY

## 2019-07-01 NOTE — H&P
History and physical examination    Assessment/Plan      Assessment:  Phimosis, recurrent balanitis  History of adenocarcinoma of the prostate treated with radiation therapy in 2012  History of renal stone disease with right renal stone in lower pole approximately 4 mm on last KUB  Plan:  Circumcision     History of Present Illness          HPI:  Margarito Ledesma is a 66 y  o  male who presents with a history of recurrent balanitis with phimosis   The patient presented to the office noting a painful tender distal foreskin that was unable to be retracted back over the head of the penis   This has been a chronic problem with inflammatory episodes being episodic   The patient was made aware of his need for circumcision either in a formal sense or dorsal slit   The patient has opted for circumcision understanding risks of infection bleeding pain   Patient presents for that procedure      Review of Systems   Constitutional: Negative     HENT: Negative     Eyes: Negative     Respiratory: Negative     Cardiovascular: Negative     Gastrointestinal: Negative     Endocrine: Negative     Genitourinary: Positive for urgency     Musculoskeletal: Negative     Neurological: Negative     Psychiatric/Behavioral: Negative           Historical Information          Medical History           Past Medical History:   Diagnosis Date    Anesthesia       "once in awhile takes awhile to wake up:    Arthritis      Balance problems      Cancer [facility])      Cardiac disease      Coronary artery disease      Dry mouth      Full dentures      History of coronary artery stent placement       x2, "approx 20 yrs ago or more"    History of radiation therapy       "20 treatments for prostate" "approx 10yrs ago"    [place] hearing[place]      Hyperlipidemia      Hypertension      Kidney stone      Neck pain      Prostate CA (HCC)      Risk for falls      Shortness of breath       occas    Swelling of ankle       occas both ankles    Urinary frequency      Urinary urgency      Vertigo      Wears glasses           Surgical History             Past Surgical History:   Procedure Laterality Date    CAROTID ENDARTERECTOMY Left      CHOLECYSTECTOMY        COLONOSCOPY        CORONARY STENT PLACEMENT         x2    CYSTOSCOPY        CYSTOSCOPY W/ URETERAL STENT REMOVAL        DENTAL SURGERY        HEMORRHOID SURGERY        NV CYSTO/URETERO W/LITHOTRIPSY &INDWELL STENT INSRT Right 12/25/2017     Procedure: CYSTOSCOPY URETEROSCOPY WITH LITHOTRIPSY HOLMIUM LASER, RETROGRADE PYELOGRAM AND INSERTION STENT URETERAL;  Surgeon: Rafal Golden MD;  [facility];  Service: Urology    NV CYSTO/URETERO W/LITHOTRIPSY &INDWELL STENT INSRT Right 1/19/2018     Procedure: CYSTOSCOPY URETEROSCOPIC STONE EXTRACTION  WITH LITHOTRIPSY HOLMIUM LASER, RETROGRADE PYELOGRAM AND INSERTION STENT URETERAL;  Surgeon: Terry Donnelly MD;  Location[facility];  Service: Urology    NV FRAGMENT KIDNEY STONE/ ESWL Right 4/26/2018     Procedure: RIGHT LITHROTRIPSY EXTRACORPORAL SHOCKWAVE (ESWL);  Surgeon: Terry Donnelly MD;  Location[facility];  Service: Urology    ROTATOR CUFF REPAIR Right           Social History          Social History           Substance and Sexual Activity   Alcohol Use Yes    Alcohol/week: 0 6 oz    Types: 1 Cans of beer per week     Comment: occasionally      Social History            Substance and Sexual Activity   Drug Use No      Social History              Tobacco Use   Smoking Status Current Some Day Smoker    Years: 57 00    Types: Cigars   Smokeless Tobacco Never Used   Tobacco Comment     declined      Family History: History reviewed   No pertinent family history      Meds/Allergies   all medications and allergies reviewed            Allergies   Allergen Reactions    Indomethacin Other (See Comments)       Pt unsure of reaction     Lisinopril Other (See Comments)       COUGH,,pt cant remember    Niacin         Other reaction(s): Other (See Comments)  red, flushed skin    Nystatin         Pt unsure    Rosuvastatin Other (See Comments)       MUSCLE ACHES,, pt unsure of reaction         Objective   Vitals: Blood pressure 132/80, pulse (!) 116, height 5' 5 5" (1 664 m), weight 93 kg (205 lb)      [unfilled]           Invasive Devices                           Peripheral Intravenous Line                            Peripheral IV 04/26/18 Right Forearm 396 days                                Drain                            Ureteral Drain/Stent Right ureter 22 Fr  519 days       Ureteral Drain/Stent Right ureter 6 Fr  494 days                                        Airway                            Supraglottic Airway LMA 4 397 days                           Physical Exam   Constitutional: He is oriented to person, place, and time  He appears well-developed and well-nourished  HENT:   Head: Normocephalic and atraumatic  Eyes: EOM are normal    Neck: Neck supple  Cardiovascular: Normal rate, regular rhythm, normal heart sounds and intact distal pulses  Exam reveals no friction rub    No murmur heard  Pulmonary/Chest: Effort normal and breath sounds normal  No stridor  No respiratory distress  He has no wheezes  He has no rales  He exhibits no tenderness  Abdominal: Soft  He exhibits no distension and no mass  There is no tenderness  There is no rebound and no guarding  Genitourinary:   Genitourinary Comments: Phallus uncircumcised with 5 modest somewhat tender foreskin   No other penile or scrotal lesions identified    Musculoskeletal: Normal range of motion  Neurological: He is alert and oriented to person, place, and time  Psychiatric: He has a normal mood and affect   His behavior is normal  Judgment and thought content normal    Vitals reviewed         Lab Results: I have personally reviewed pertinent reports     Imaging: I have personally reviewed pertinent reports     XR abdomen 1 view kub   Status: Final result   PACS Images       Show images for XR abdomen 1 view kub   Study Result      ABDOMEN     INDICATION:   N20 0: Calculus of kidney      COMPARISON:  Abdomen plain films from 5/10/2018   Abdomen and pelvic CT from 4/26/2018      VIEWS:  AP supine        FINDINGS:     Unchanged, faint 4 mm right kidney lower pole stone      No radiopaque ureteral calculi identified      Nonobstructive bowel gas pattern      No acute osseous abnormality is seen      IMPRESSION:     Unchanged, faint 4 mm right kidney lower pole stone         Workstation performed: [id number]      Imaging      XR abdomen 1 view kub (Order: [id number]) - 5/21/2019   Result History      XR abdomen 1 view kub (Order #[id number]) on 5/24/2019 - Order Result History Report   Order Report      Order Details   Reason for Exam      Dx: Kidney stone [N20 0 (ICD-10-CM)]   All Reviewers List      Diana Acosta MD on 5/24/2019 11:02 AM   Signed by      Signed Date/Time   Phone Pager   Sole Zurita 5/24/2019 10:24 [phone]     Exam Information               Status Exam Begun   Exam Ended     Final [99] 5/21/2019 07:50 5/21/2019 08:09   External Results Report      Open External Results Report    Encounter      View Encounter                 Patient Care Timeline      No data selected in time range   Pre-op Summary      Pre-op            Recovery Summary      Recovery              Routing History      None         EKG, Pathology, and Other Studies: I have personally reviewed pertinent reports     VTE Prophylaxis: Sequential compression device (Venodyne)  and Heparin

## 2019-07-06 ENCOUNTER — LAB (OUTPATIENT)
Dept: LAB | Facility: CLINIC | Age: 79
End: 2019-07-06
Payer: MEDICARE

## 2019-07-06 ENCOUNTER — APPOINTMENT (OUTPATIENT)
Dept: LAB | Facility: CLINIC | Age: 79
End: 2019-07-06
Payer: MEDICARE

## 2019-07-06 DIAGNOSIS — N47.1 PHIMOSIS: ICD-10-CM

## 2019-07-06 DIAGNOSIS — N48.1 BALANITIS: ICD-10-CM

## 2019-07-06 DIAGNOSIS — R39.89 SUSPECTED UTI: ICD-10-CM

## 2019-07-06 DIAGNOSIS — Z01.810 PRE-OPERATIVE CARDIOVASCULAR EXAMINATION: ICD-10-CM

## 2019-07-06 DIAGNOSIS — Z01.812 PRE-OPERATIVE LABORATORY EXAMINATION: ICD-10-CM

## 2019-07-06 DIAGNOSIS — Z79.01 LONG TERM (CURRENT) USE OF ANTICOAGULANTS: ICD-10-CM

## 2019-07-06 LAB
ALBUMIN SERPL BCP-MCNC: 3.9 G/DL (ref 3.5–5)
ALP SERPL-CCNC: 64 U/L (ref 46–116)
ALT SERPL W P-5'-P-CCNC: 36 U/L (ref 12–78)
ANION GAP SERPL CALCULATED.3IONS-SCNC: 9 MMOL/L (ref 4–13)
APTT PPP: 28 SECONDS (ref 23–37)
AST SERPL W P-5'-P-CCNC: 23 U/L (ref 5–45)
BASOPHILS # BLD AUTO: 0.05 THOUSANDS/ΜL (ref 0–0.1)
BASOPHILS NFR BLD AUTO: 1 % (ref 0–1)
BILIRUB SERPL-MCNC: 0.8 MG/DL (ref 0.2–1)
BUN SERPL-MCNC: 25 MG/DL (ref 5–25)
CALCIUM SERPL-MCNC: 9 MG/DL (ref 8.3–10.1)
CHLORIDE SERPL-SCNC: 103 MMOL/L (ref 100–108)
CO2 SERPL-SCNC: 29 MMOL/L (ref 21–32)
CREAT SERPL-MCNC: 1.12 MG/DL (ref 0.6–1.3)
EOSINOPHIL # BLD AUTO: 0.39 THOUSAND/ΜL (ref 0–0.61)
EOSINOPHIL NFR BLD AUTO: 5 % (ref 0–6)
ERYTHROCYTE [DISTWIDTH] IN BLOOD BY AUTOMATED COUNT: 12.9 % (ref 11.6–15.1)
GFR SERPL CREATININE-BSD FRML MDRD: 63 ML/MIN/1.73SQ M
GLUCOSE P FAST SERPL-MCNC: 114 MG/DL (ref 65–99)
HCT VFR BLD AUTO: 40.6 % (ref 36.5–49.3)
HGB BLD-MCNC: 13.5 G/DL (ref 12–17)
IMM GRANULOCYTES # BLD AUTO: 0.01 THOUSAND/UL (ref 0–0.2)
IMM GRANULOCYTES NFR BLD AUTO: 0 % (ref 0–2)
INR PPP: 1.01 (ref 0.84–1.19)
LYMPHOCYTES # BLD AUTO: 1.4 THOUSANDS/ΜL (ref 0.6–4.47)
LYMPHOCYTES NFR BLD AUTO: 18 % (ref 14–44)
MCH RBC QN AUTO: 32.1 PG (ref 26.8–34.3)
MCHC RBC AUTO-ENTMCNC: 33.3 G/DL (ref 31.4–37.4)
MCV RBC AUTO: 97 FL (ref 82–98)
MONOCYTES # BLD AUTO: 0.93 THOUSAND/ΜL (ref 0.17–1.22)
MONOCYTES NFR BLD AUTO: 12 % (ref 4–12)
NEUTROPHILS # BLD AUTO: 4.9 THOUSANDS/ΜL (ref 1.85–7.62)
NEUTS SEG NFR BLD AUTO: 64 % (ref 43–75)
NRBC BLD AUTO-RTO: 0 /100 WBCS
PLATELET # BLD AUTO: 173 THOUSANDS/UL (ref 149–390)
PMV BLD AUTO: 9.9 FL (ref 8.9–12.7)
POTASSIUM SERPL-SCNC: 4.2 MMOL/L (ref 3.5–5.3)
PROT SERPL-MCNC: 7.3 G/DL (ref 6.4–8.2)
PROTHROMBIN TIME: 12.7 SECONDS (ref 11.6–14.5)
RBC # BLD AUTO: 4.2 MILLION/UL (ref 3.88–5.62)
SODIUM SERPL-SCNC: 141 MMOL/L (ref 136–145)
WBC # BLD AUTO: 7.68 THOUSAND/UL (ref 4.31–10.16)

## 2019-07-06 PROCEDURE — 87086 URINE CULTURE/COLONY COUNT: CPT

## 2019-07-06 PROCEDURE — 36415 COLL VENOUS BLD VENIPUNCTURE: CPT

## 2019-07-06 PROCEDURE — 85025 COMPLETE CBC W/AUTO DIFF WBC: CPT

## 2019-07-06 PROCEDURE — 85730 THROMBOPLASTIN TIME PARTIAL: CPT

## 2019-07-06 PROCEDURE — 93005 ELECTROCARDIOGRAM TRACING: CPT

## 2019-07-06 PROCEDURE — 85610 PROTHROMBIN TIME: CPT

## 2019-07-06 PROCEDURE — 80053 COMPREHEN METABOLIC PANEL: CPT

## 2019-07-07 LAB — BACTERIA UR CULT: NORMAL

## 2019-07-08 LAB
ATRIAL RATE: 72 BPM
P AXIS: 18 DEGREES
PR INTERVAL: 180 MS
QRS AXIS: -31 DEGREES
QRSD INTERVAL: 108 MS
QT INTERVAL: 408 MS
QTC INTERVAL: 446 MS
T WAVE AXIS: 11 DEGREES
VENTRICULAR RATE: 72 BPM

## 2019-07-08 PROCEDURE — 93010 ELECTROCARDIOGRAM REPORT: CPT | Performed by: INTERNAL MEDICINE

## 2019-07-11 ENCOUNTER — TELEPHONE (OUTPATIENT)
Dept: UROLOGY | Facility: MEDICAL CENTER | Age: 79
End: 2019-07-11

## 2019-07-11 ENCOUNTER — ANESTHESIA EVENT (OUTPATIENT)
Dept: PERIOP | Facility: HOSPITAL | Age: 79
End: 2019-07-11
Payer: MEDICARE

## 2019-07-11 NOTE — TELEPHONE ENCOUNTER
Patient of Dr Clara Garcia, scheduled for circumcision  Called to advise he has been unable to pull excess skin back to clean  He is requesting a call back at [phone] to discuss

## 2019-07-11 NOTE — TELEPHONE ENCOUNTER
Spoke to wife  Pt is scheduled for circumcision tomorrow for this chronic problem  Informed her hospital will call with time pt is to report

## 2019-07-12 ENCOUNTER — HOSPITAL ENCOUNTER (OUTPATIENT)
Facility: HOSPITAL | Age: 79
Setting detail: OUTPATIENT SURGERY
Discharge: HOME | End: 2019-07-12
Attending: UROLOGY | Admitting: UROLOGY
Payer: MEDICARE

## 2019-07-12 ENCOUNTER — ANESTHESIA (OUTPATIENT)
Dept: PERIOP | Facility: HOSPITAL | Age: 79
End: 2019-07-12
Payer: MEDICARE

## 2019-07-12 VITALS
OXYGEN SATURATION: 94 % | WEIGHT: 200 LBS | DIASTOLIC BLOOD PRESSURE: 61 MMHG | HEIGHT: 66 IN | TEMPERATURE: 97.7 F | HEART RATE: 78 BPM | RESPIRATION RATE: 15 BRPM | SYSTOLIC BLOOD PRESSURE: 137 MMHG | BODY MASS INDEX: 32.14 KG/M2

## 2019-07-12 DIAGNOSIS — N47.1 PHIMOSIS: ICD-10-CM

## 2019-07-12 LAB — GLUCOSE SERPL-MCNC: 122 MG/DL (ref 65–140)

## 2019-07-12 PROCEDURE — NC001 PR NO CHARGE: Performed by: UROLOGY

## 2019-07-12 PROCEDURE — 54161 CIRCUM 28 DAYS OR OLDER: CPT | Performed by: UROLOGY

## 2019-07-12 PROCEDURE — 82948 REAGENT STRIP/BLOOD GLUCOSE: CPT

## 2019-07-12 PROCEDURE — 88304 TISSUE EXAM BY PATHOLOGIST: CPT | Performed by: PATHOLOGY

## 2019-07-12 RX ORDER — ONDANSETRON 2 MG/ML
4 INJECTION INTRAMUSCULAR; INTRAVENOUS ONCE AS NEEDED
Status: DISCONTINUED | OUTPATIENT
Start: 2019-07-12 | End: 2019-07-12 | Stop reason: HOSPADM

## 2019-07-12 RX ORDER — HYDROCODONE BITARTRATE AND ACETAMINOPHEN 5; 325 MG/1; MG/1
1 TABLET ORAL EVERY 6 HOURS PRN
Qty: 10 TABLET | Refills: 0 | Status: SHIPPED | OUTPATIENT
Start: 2019-07-12 | End: 2020-12-18 | Stop reason: ALTCHOICE

## 2019-07-12 RX ORDER — MAGNESIUM HYDROXIDE 1200 MG/15ML
LIQUID ORAL AS NEEDED
Status: DISCONTINUED | OUTPATIENT
Start: 2019-07-12 | End: 2019-07-12 | Stop reason: HOSPADM

## 2019-07-12 RX ORDER — FENTANYL CITRATE 50 UG/ML
INJECTION, SOLUTION INTRAMUSCULAR; INTRAVENOUS AS NEEDED
Status: DISCONTINUED | OUTPATIENT
Start: 2019-07-12 | End: 2019-07-12 | Stop reason: SURG

## 2019-07-12 RX ORDER — OXYCODONE HYDROCHLORIDE AND ACETAMINOPHEN 5; 325 MG/1; MG/1
1 TABLET ORAL EVERY 4 HOURS PRN
Status: DISCONTINUED | OUTPATIENT
Start: 2019-07-12 | End: 2019-07-12 | Stop reason: HOSPADM

## 2019-07-12 RX ORDER — CEFAZOLIN SODIUM 2 G/50ML
2000 SOLUTION INTRAVENOUS ONCE
Status: COMPLETED | OUTPATIENT
Start: 2019-07-12 | End: 2019-07-12

## 2019-07-12 RX ORDER — ACETAMINOPHEN 325 MG/1
650 TABLET ORAL EVERY 6 HOURS PRN
Status: DISCONTINUED | OUTPATIENT
Start: 2019-07-12 | End: 2019-07-12 | Stop reason: HOSPADM

## 2019-07-12 RX ORDER — MORPHINE SULFATE 4 MG/ML
4 INJECTION, SOLUTION INTRAMUSCULAR; INTRAVENOUS EVERY 6 HOURS PRN
Status: DISCONTINUED | OUTPATIENT
Start: 2019-07-12 | End: 2019-07-12 | Stop reason: HOSPADM

## 2019-07-12 RX ORDER — ONDANSETRON 2 MG/ML
INJECTION INTRAMUSCULAR; INTRAVENOUS AS NEEDED
Status: DISCONTINUED | OUTPATIENT
Start: 2019-07-12 | End: 2019-07-12 | Stop reason: SURG

## 2019-07-12 RX ORDER — EPHEDRINE SULFATE 50 MG/ML
INJECTION INTRAVENOUS AS NEEDED
Status: DISCONTINUED | OUTPATIENT
Start: 2019-07-12 | End: 2019-07-12 | Stop reason: SURG

## 2019-07-12 RX ORDER — GLYCOPYRROLATE 0.2 MG/ML
INJECTION INTRAMUSCULAR; INTRAVENOUS AS NEEDED
Status: DISCONTINUED | OUTPATIENT
Start: 2019-07-12 | End: 2019-07-12 | Stop reason: SURG

## 2019-07-12 RX ORDER — PROPOFOL 10 MG/ML
INJECTION, EMULSION INTRAVENOUS AS NEEDED
Status: DISCONTINUED | OUTPATIENT
Start: 2019-07-12 | End: 2019-07-12 | Stop reason: SURG

## 2019-07-12 RX ORDER — BUPIVACAINE HYDROCHLORIDE 5 MG/ML
INJECTION, SOLUTION EPIDURAL; INTRACAUDAL AS NEEDED
Status: DISCONTINUED | OUTPATIENT
Start: 2019-07-12 | End: 2019-07-12 | Stop reason: HOSPADM

## 2019-07-12 RX ORDER — FENTANYL CITRATE/PF 50 MCG/ML
25 SYRINGE (ML) INJECTION
Status: DISCONTINUED | OUTPATIENT
Start: 2019-07-12 | End: 2019-07-12 | Stop reason: HOSPADM

## 2019-07-12 RX ORDER — SODIUM CHLORIDE 9 MG/ML
125 INJECTION, SOLUTION INTRAVENOUS CONTINUOUS
Status: DISCONTINUED | OUTPATIENT
Start: 2019-07-12 | End: 2019-07-12 | Stop reason: HOSPADM

## 2019-07-12 RX ORDER — LIDOCAINE HYDROCHLORIDE 20 MG/ML
INJECTION, SOLUTION INFILTRATION; PERINEURAL AS NEEDED
Status: DISCONTINUED | OUTPATIENT
Start: 2019-07-12 | End: 2019-07-12 | Stop reason: HOSPADM

## 2019-07-12 RX ADMIN — PHENYLEPHRINE HYDROCHLORIDE 100 MCG: 10 INJECTION INTRAVENOUS at 08:10

## 2019-07-12 RX ADMIN — PROPOFOL 200 MG: 10 INJECTION, EMULSION INTRAVENOUS at 07:43

## 2019-07-12 RX ADMIN — PHENYLEPHRINE HYDROCHLORIDE 100 MCG: 10 INJECTION INTRAVENOUS at 08:17

## 2019-07-12 RX ADMIN — EPHEDRINE SULFATE 10 MG: 50 INJECTION, SOLUTION INTRAVENOUS at 08:02

## 2019-07-12 RX ADMIN — EPHEDRINE SULFATE 5 MG: 50 INJECTION, SOLUTION INTRAVENOUS at 07:59

## 2019-07-12 RX ADMIN — CEFAZOLIN SODIUM 2000 MG: 2 SOLUTION INTRAVENOUS at 07:35

## 2019-07-12 RX ADMIN — EPHEDRINE SULFATE 10 MG: 50 INJECTION, SOLUTION INTRAVENOUS at 08:15

## 2019-07-12 RX ADMIN — FENTANYL CITRATE 25 MCG: 50 INJECTION, SOLUTION INTRAMUSCULAR; INTRAVENOUS at 08:19

## 2019-07-12 RX ADMIN — GLYCOPYRROLATE 0.2 MG: 0.2 INJECTION INTRAMUSCULAR; INTRAVENOUS at 08:05

## 2019-07-12 RX ADMIN — LIDOCAINE HYDROCHLORIDE 100 MG: 20 INJECTION, SOLUTION INTRAVENOUS at 07:43

## 2019-07-12 RX ADMIN — OXYCODONE HYDROCHLORIDE AND ACETAMINOPHEN 1 TABLET: 5; 325 TABLET ORAL at 11:24

## 2019-07-12 RX ADMIN — SODIUM CHLORIDE 125 ML/HR: 0.9 INJECTION, SOLUTION INTRAVENOUS at 06:21

## 2019-07-12 RX ADMIN — FENTANYL CITRATE 25 MCG: 50 INJECTION, SOLUTION INTRAMUSCULAR; INTRAVENOUS at 07:56

## 2019-07-12 RX ADMIN — ONDANSETRON HYDROCHLORIDE 4 MG: 2 INJECTION, SOLUTION INTRAVENOUS at 08:18

## 2019-07-12 NOTE — DISCHARGE SUMMARY
Discharge Summary - Ted Pierce 66 y o  male MRN: [id number]    Unit/Bed#: OR POOL Encounter: [id number]    Admission Date: 7/12/2019     Discharge Date:  07/12/2019     Admitting Diagnosis: Phimosis [N47 1]    Admitting Provider: Saul Sood MD    Discharging Provider: Saul Sood MD    Primary Care Physician at Discharge: Manuela Patel [phone]     HPI:This is a 66 y o  old male presented with Phimosis [N47 1],   The patient had the inability to retract his foreskin and this was interfering with voiding as well as penile hygiene  Circumcision was recommended and the patient was taken to the operating room on 07/12/2019 and under LMA anesthesia with local lidocaine and Marcaine penile block anesthesia circumcision was performed without difficulty or complication  The patient was discharged that same day and will return to the office in 2 weeks for wound examination  There were no complications  Allergies   Allergen Reactions    Indomethacin Other (See Comments)     Pt unsure of reaction     Lisinopril Other (See Comments)     COUGH,,pt cant remember    Niacin      Other reaction(s): Other (See Comments)  red, flushed skin    Nystatin      Pt unsure    Rosuvastatin Other (See Comments)     MUSCLE ACHES,, pt unsure of reaction       Consults   None       Order Current Status    Tissue Exam Collected (07/12/19 0809)          Procedures Performed: No orders of the defined types were placed in this encounter  Hospital Course: Tolerated hospitalization well without complication    Significant Findings, Care, Treatment and Services Provided:  Circumcision    Complications:  None    Discharge Diagnosis:  Phimosis    Condition at Discharge: good     Discharge instructions/Information to patient and family:   See after visit summary for information provided to patient and family        Provisions for Follow-Up Care:  See after visit summary for information related to follow-up care and any pertinent home health orders  Disposition: Home    Planned Readmission: No    Discharge Statement   I spent 30 minutes discharging the patient  This time was spent on the day of discharge  I had direct contact with the patient on the day of discharge  Additional documentation is required if more than 30 minutes were spent on discharge  Discharge Medications:  See after visit summary for reconciled discharge medications provided to patient and family          ?  ?

## 2019-07-12 NOTE — INTERVAL H&P NOTE
H&P reviewed  After examining the patient I find no changes in the patients condition since the H&P had been written  /73   Pulse 69   Temp 98 2 °F (36 8 °C) (Tympanic)   Resp 16   Ht 5' 5 5" (1 664 m)   Wt 90 7 kg (200 lb)   SpO2 94%   BMI 32 78 kg/m²

## 2019-07-12 NOTE — OP NOTE
OPERATIVE REPORT  PATIENT NAME: Greg Reeder    :  1940  MRN: [id number]  Pt Location: [facility] ROOM 08    SURGERY DATE: 2019    Surgeon(s) and Role:     * Gerri Farr MD - Primary    Preop Diagnosis:  Phimosis [N47 1]    Post-Op Diagnosis Codes:     * Phimosis [N47 1]    Procedure(s) (LRB):  CIRCUMCISION ADULT (N/A)    Specimen(s):  ID Type Source Tests Collected by Time Destination   1 : foreskin Tissue Foreskin TISSUE EXAM Gerri Farr MD 2019 0809        Estimated Blood Loss:   Minimal    Drains:  None                     Anesthesia Type:   General    Operative Indications:  Phimosis [N47 1]       Operative Findings:  Phimosis of the foreskin    Complications:   None    Procedure and Technique:  Patient was seen in the holding area and I again reviewed the procedure of circumcision  The patient agreed to the procedure understanding risks of penile injury dehiscence bleeding infection and pain  Patient was taken to the operating room and after appropriate time-out and after being placed supine on the operating table with LMA anesthesia induced 20 cc of lidocaine 2% mixed with Marcaine 0 5% with both without epinephrine was injected in a penile block fashion  After anesthesia was obtained and after appropriate time-out a circumcision was performed  Using a 15 scalpel blade a circumferential incision was made in the cutaneous aspect of the foreskin approximately 2 cm proximal to the coronal sulcus of the penis  At the same romero to did another incision was made in the mucosal aspect of the foreskin circumferentially  Hemostasis was obtained through the use electric cautery  Chromic 3 O in 4 0 suture in an interrupted simple fashion was used to reapproximate the cut free edges of the foreskin  There was no significant bleeding and the results was excellent with reduction of phimosis and no evidence of paraphimosis    The trimmed foreskin was sent to the pathology Department for microscopic evaluation  At the conclusion of the procedure all needle and instrument counts were reported to me as being correct  Sponge counts were reported as correct as well  Patient was then transferred to the recovery room having tolerated the procedure well and in good condition     I was present for the entire procedure and A qualified resident physician was not available    Patient Disposition:  PACU     SIGNATURE: Sartia Dorantes MD  DATE: July 12, 2019  TIME: 8:31 AM

## 2019-07-12 NOTE — INTERIM OP NOTE
CIRCUMCISION ADULT  Postoperative Note  PATIENT NAME: Laura Lala  : 1940  MRN: [id number]  [facility] OR ROOM 08    Surgery Date: 2019    Preop Diagnosis:  Phimosis [N47 1]    Post-Op Diagnosis Codes:     * Phimosis [N47 1]    Procedure(s) (LRB):  CIRCUMCISION ADULT (N/A)    Surgeon(s) and Role:     * Francois Spatz, MD - Primary    Specimens:  ID Type Source Tests Collected by Time Destination   1 : foreskin Tissue Foreskin TISSUE EXAM Francois Spatz, MD 2019 [id number]        Estimated Blood Loss:   Minimal    Anesthesia Type:   General     Findings:    Phimosis  Complications:   None    SIGNATURE: Francois Spatz, MD   DATE: 2019   TIME: 8:30 AM

## 2019-07-12 NOTE — ANESTHESIA PREPROCEDURE EVALUATION
Review of Systems/Medical History  Patient summary reviewed  Chart reviewed  No history of anesthetic complications     Cardiovascular  Hyperlipidemia, Hypertension on > 1 medication, No past MI , CAD , CAD status: 1VD, No history of CABG, Cardiac stents > 1 year No history of percutaneous transluminal coronary angioplasty, No angina , CABRERA,    Pulmonary  Smoker cigar smoker  , Tobacco cessation counseling given , Shortness of breath, Sleep apnea CPAP,        GI/Hepatic    GERD well controlled,        Kidney stones, Prostatic disorder, history of prostate cancer       Endo/Other  Diabetes well controlled type 2 Oral agent,      GYN       Hematology   Musculoskeletal    Arthritis     Neurology  Negative neurology ROS      Psychology   Depression , being treated for depression,              Physical Exam    Airway    Mallampati score: II  TM Distance: >3 FB  Neck ROM: full     Dental   lower dentures and upper dentures,     Cardiovascular  Rhythm: regular, Rate: normal, Cardiovascular exam normal    Pulmonary  Pulmonary exam normal Breath sounds clear to auscultation,     Other Findings        Anesthesia Plan  ASA Score- 3     Anesthesia Type- general with ASA Monitors  Additional Monitors:   Airway Plan: LMA  Plan Factors-Patient not instructed to abstain from smoking on day of procedure  Patient did not smoke on day of surgery  Induction- intravenous  Postoperative Plan-     Informed Consent- Anesthetic plan and risks discussed with patient

## 2019-07-12 NOTE — DISCHARGE INSTRUCTIONS
Keep dressing on for the next 48 hours and then take a 20 minutes warm Sitz bath and removed dressing  Thereafter dressed with clean underwear on daily basis     Adult Male Circumcision   WHAT YOU NEED TO KNOW:   Circumcision is surgery to remove the foreskin of the penis  The foreskin is the fold of skin that covers the tip of the penis  DISCHARGE INSTRUCTIONS:   Medicines:   · Pain medicine: You may need medicine to take away or decrease pain  ¨ Learn how to take your medicine  Ask what medicine and how much you should take  Be sure you know how, when, and how often to take it  ¨ Do not wait until the pain is severe before you take your medicine  Tell caregivers if your pain does not decrease  ¨ Pain medicine can make you dizzy or sleepy  Prevent falls by calling someone when you get out of bed or if you need help  · Antibiotics: This medicine is given to fight or prevent an infection caused by bacteria  Always take your antibiotics exactly as ordered by your healthcare provider  Do not stop taking your medicine unless directed by your healthcare provider  Never save antibiotics or take leftover antibiotics that were given to you for another illness  · Take your medicine as directed  Contact your healthcare provider if you think your medicine is not helping or if you have side effects  Tell him or her if you are allergic to any medicine  Keep a list of the medicines, vitamins, and herbs you take  Include the amounts, and when and why you take them  Bring the list or the pill bottles to follow-up visits  Carry your medicine list with you in case of an emergency  Follow up with your healthcare provider as directed: You may need to return to have your stitches removed  Write down your questions so you remember to ask them during your visits  Sexual activity:  You may need to wait 4 to 6 weeks after the procedure before sexual activity   Ask your healthcare provider before you engage in sexual activity  Rest when you need to while you heal after surgery  Slowly start to do more each day  Return to your daily activities as directed  Self-care:   · Bathing and wound care: The bandages may be removed 24 to 48 hours after the procedure  When you are allowed to shower, carefully wash the incision with soap and water every day  Do not take a tub bath or get in a hot tub until your healthcare provider says it is okay  · Clothing:  Do not wear tight-fitting briefs, shorts, or pants  Contact your healthcare provider if:   · You have a fever  · You have pain when you urinate  · You have chills, a cough, or feel weak and achy  · You have questions or concerns about your procedure, condition, or care  Seek care immediately or call 911 if:   · Your urine has blood in it, becomes very cloudy, and smells bad  · You cannot urinate  · You have pain or swelling of the penis that does not go away, even with medicine  · Your incision is red, swollen, painful, and leaking pus  © 2017 [facility] Information is for End User's use only and may not be sold, redistributed or otherwise used for commercial purposes  All illustrations and images included in CareNotes® are the copyrighted property of A D A M , Inc  or Denzel Leong  The above information is an  only  It is not intended as medical advice for individual conditions or treatments  Talk to your doctor, nurse or pharmacist before following any medical regimen to see if it is safe and effective for you

## 2019-07-29 ENCOUNTER — TELEPHONE (OUTPATIENT)
Dept: UROLOGY | Facility: AMBULATORY SURGERY CENTER | Age: 79
End: 2019-07-29

## 2019-07-29 NOTE — TELEPHONE ENCOUNTER
Patient managed by Megan Koehler has an appointment today for a wound check and to remove stitches  Wife would like to know if he should take a hydrocodone for pain before his appointment  Please advise

## 2019-07-29 NOTE — TELEPHONE ENCOUNTER
Spoke with wife and told her that is a good idea as long as he doesn't drive to office  She is going to drive him to office

## 2019-07-30 PROBLEM — N20.0 NEPHROLITHIASIS: Status: RESOLVED | Noted: 2017-12-24 | Resolved: 2019-07-30

## 2019-07-30 PROBLEM — Z87.438 HISTORY OF PHIMOSIS: Status: ACTIVE | Noted: 2019-07-30

## 2019-07-30 NOTE — PROGRESS NOTES
7/31/2019      Chief Complaint   Patient presents with    phimosis       Assessment and Plan    66 y o  male managed by Dr Jerry Granados    1  Phimosis and recurrent balanitis s/p circumcision 7/12/19  - patient well-healed and most sutures have dissolved    2  Prostate cancer s/p radiation therapy in 2012  - PSA < 0 1 (5/21/19)  - due again 5/2020    3  Nephrolithiasis  - KUB 5/21/19 revealed a stable 4 mm right lower pole stone    Follow up May 2020 with a KUB and PSA prior      History of Present Illness  Anya Grimes is a 66 y o  male here for follow up evaluation of phimosis recurrent balanitis status post circumcision 7/12/2019, prostate cancer status post radiation therapy in 2012, and history of nephrolithiasis  The patient presents today for wound check after his circumcision  He has been doing well and denies any complications  His most recent PSA was undetectable and his KUB revealed a stable punctate right lower pole stone both obtained in May of 2019  Review of Systems   Constitutional: Negative for activity change, chills and fever  Gastrointestinal: Negative for abdominal distention and abdominal pain  Musculoskeletal: Negative for back pain and gait problem  Psychiatric/Behavioral: Negative for behavioral problems and confusion         Past Medical History  Past Medical History:   Diagnosis Date    Anesthesia     "once in awhile takes awhile to wake up:    Arthritis     Balance problems     Cancer [facility])     Cardiac disease     Coronary artery disease     CPAP (continuous positive airway pressure) dependence     Dry mouth     Full dentures     Full dentures     History of coronary artery stent placement     x2, "approx 20 yrs ago or more"    History of radiation therapy     "20 treatments for prostate" "approx 10yrs ago"    [place] (hard of hearing)     Hyperlipidemia     Hypertension     Kidney stone     Neck pain     Phimosis     circumcison today 7/12/2019    Prostate CA ([address] )     Risk for falls     Shortness of breath     occas    Sleep apnea     Swelling of ankle     occas both ankles    Urinary frequency     Urinary urgency     Vertigo     Wears glasses     Wears hearing aid in both ears        Past Social History  Past Surgical History:   Procedure Laterality Date    CAROTID ENDARTERECTOMY Left     CHOLECYSTECTOMY      COLONOSCOPY      CORONARY STENT PLACEMENT      x2    CYSTOSCOPY      CYSTOSCOPY W/ URETERAL STENT REMOVAL      DENTAL SURGERY      HEMORRHOID SURGERY      MO CIRCUMCISION,OTHR N/A 7/12/2019    Procedure: CIRCUMCISION ADULT;  Surgeon: Myrtis Shone, MD;  [facility];  Service: Urology    MO CYSTO/URETERO W/LITHOTRIPSY &INDWELL STENT INSRT Right 12/25/2017    Procedure: CYSTOSCOPY URETEROSCOPY WITH LITHOTRIPSY HOLMIUM LASER, RETROGRADE PYELOGRAM AND INSERTION STENT URETERAL;  Surgeon: Soledad Goodell, MD;  [facility];  Service: Urology    MO CYSTO/URETERO W/LITHOTRIPSY &INDWELL STENT INSRT Right 1/19/2018    Procedure: CYSTOSCOPY URETEROSCOPIC STONE EXTRACTION  WITH LITHOTRIPSY HOLMIUM LASER, RETROGRADE PYELOGRAM AND INSERTION STENT URETERAL;  Surgeon: Myrtis Shone, MD;  [facility];  Service: Urology    MO FRAGMENT KIDNEY STONE/ ESWL Right 4/26/2018    Procedure: RIGHT LITHROTRIPSY EXTRACORPORAL SHOCKWAVE (ESWL); Surgeon: Myrtis Shone, MD;  [facility];  Service: Urology    ROTATOR CUFF REPAIR Right      Social History     Tobacco Use   Smoking Status Current Some Day Smoker    Years: 57 00    Types: Cigars   Smokeless Tobacco Never Used   Tobacco Comment    declined       Past Family History  No family history on file      Past Social history  Social History     Socioeconomic History    Marital status: /Civil Union     Spouse name: Not on file    Number of children: Not on file    Years of education: Not on file    Highest education level: Not on file Occupational History    Not on file   Social Needs    Financial resource strain: Not on file    Food insecurity:     Worry: Not on file     Inability: Not on file    Transportation needs:     Medical: Not on file     Non-medical: Not on file   Tobacco Use    Smoking status: Current Some Day Smoker     Years: 57 00     Types: Cigars    Smokeless tobacco: Never Used    Tobacco comment: declined   Substance and Sexual Activity    Alcohol use:  Yes     Alcohol/week: 1 0 standard drinks     Types: 1 Cans of beer per week     Frequency: Monthly or less     Comment: occasionally    Drug use: No    Sexual activity: Not on file   Lifestyle    Physical activity:     Days per week: Not on file     Minutes per session: Not on file    Stress: Not on file   Relationships    Social connections:     Talks on phone: Not on file     Gets together: Not on file     Attends Orthodox service: Not on file     Active member of club or organization: Not on file     Attends meetings of clubs or organizations: Not on file     Relationship status: Not on file    Intimate partner violence:     Fear of current or ex partner: Not on file     Emotionally abused: Not on file     Physically abused: Not on file     Forced sexual activity: Not on file   Other Topics Concern    Not on file   Social History Narrative    Not on file       Current Medications  Current Outpatient Medications   Medication Sig Dispense Refill    acetaminophen (TYLENOL) 325 mg tablet Take 650 mg by mouth every 4 (four) hours as needed        amLODIPine (NORVASC) 2 5 mg tablet Take 5 mg by mouth daily       aspirin (ADULT ASPIRIN EC LOW STRENGTH) 81 mg EC tablet Take 325 mg by mouth        b complex vitamins capsule Take 1 capsule by mouth daily      CALCIUM CARBONATE-VITAMIN D PO Take 1 tablet by mouth daily 600 mg      carvedilol (COREG) 6 25 mg tablet Take 12 5 mg by mouth 2 (two) times a day with meals      ezetimibe (ZETIA) 10 mg tablet Take 10 mg by mouth daily      furosemide (LASIX) 20 mg tablet Take 20 mg by mouth 2 (two) times a day      ibuprofen (MOTRIN) 200 mg tablet Take by mouth as needed        levothyroxine 50 mcg tablet Take 50 mcg by mouth daily after dinner       lidocaine (LIDODERM) 5 % Place 1 patch on the skin daily as needed Remove & Discard patch within 12 hours or as directed by MD       meclizine (ANTIVERT) 25 mg tablet Take 32 mg by mouth 2 (two) times a day Spouse states patient has been taking this medication for last 2 weeks       meloxicam (MOBIC) 15 mg tablet Take 15 mg by mouth daily      metFORMIN (GLUCOPHAGE) 500 mg tablet Take 1,000 mg by mouth 2 (two) times a day with meals       omeprazole (PriLOSEC) 40 MG capsule daily   rosuvastatin (CRESTOR) 20 MG tablet Take 40 mg by mouth daily       venlafaxine (EFFEXOR) 75 mg tablet Take 75 mg by mouth daily Pt takes along with 150mg cap once a day for total dose of 225mg once a day       HYDROcodone-acetaminophen (NORCO) 5-325 mg per tablet Take 1 tablet by mouth every 6 (six) hours as needed for pain for up to 10 dosesMax Daily Amount: 4 tablets (Patient not taking: Reported on 7/31/2019) 10 tablet 0    HYDROcodone-acetaminophen (NORCO) 5-325 mg per tablet Take 1 tablet by mouth every 6 (six) hours as needed for pain for up to 10 dosesMax Daily Amount: 4 tablets (Patient not taking: Reported on 7/31/2019) 10 tablet 0    ondansetron (ZOFRAN) 4 mg tablet Take 1 tablet (4 mg total) by mouth every 6 (six) hours (Patient not taking: Reported on 7/31/2019) 12 tablet 0     No current facility-administered medications for this visit  Allergies  Allergies   Allergen Reactions    Indomethacin Other (See Comments)     Pt unsure of reaction     Iodinated Diagnostic Agents     Lisinopril Other (See Comments)     COUGH,,pt cant remember    Niacin      Other reaction(s):  Other (See Comments)  red, flushed skin    Nystatin      Pt unsure    Rosuvastatin Other (See Comments)     MUSCLE ACHES,, pt unsure of reaction         The following portions of the patient's history were reviewed and updated as appropriate: allergies, current medications, past medical history, past social history, past surgical history and problem list       Vitals  Vitals:    07/31/19 1451   BP: 130/80   Pulse: 94   Weight: 90 7 kg (200 lb)   Height: 5' 5 5" (1 664 m)       Physical Exam  Constitutional   General appearance: Patient is seated and in no acute distress, well appearing and well nourished  Head and Face   Head and face: Normal     Eyes   Conjunctiva and lids: No erythema, swelling or discharge  Ears, Nose, Mouth, and Throat   Hearing: Normal     Pulmonary   Respiratory effort: No increased work of breathing or signs of respiratory distress  Cardiovascular   Examination of extremities for edema and/or varicosities: Normal     Abdomen   Abdomen: Non-tender, no masses  Genitourinary   Penis circumcised, phallus normal, meatus patent  Circumferential well-approximated and well-healing surgical incision  No erythema, edema, or exudates  Musculoskeletal   Gait and station:   Normal      Skin   Skin and subcutaneous tissue: Warm, dry, and intact  No visible lesions or rashes    Psychiatric   Judgment and insight: Normal  Recent and remote memory:  Normal  Mood and affect: Normal      Results  No results found for this or any previous visit (from the past 1 hour(s)) ]  Lab Results   Component Value Date    PSA <0 1 05/21/2019     Lab Results   Component Value Date    CALCIUM 9 0 07/06/2019    K 4 2 07/06/2019    CO2 29 07/06/2019     07/06/2019    BUN 25 07/06/2019    CREATININE 1 12 07/06/2019     Lab Results   Component Value Date    WBC 7 68 07/06/2019    HGB 13 5 07/06/2019    HCT 40 6 07/06/2019    MCV 97 07/06/2019     07/06/2019       Orders  Orders Placed This Encounter   Procedures    XR abdomen 1 view kub     Standing Status:   Future     Standing Expiration Date: 7/31/2023     Scheduling Instructions:      Bring along any outside films relating to this procedure  Order Specific Question:   Reason for Exam:     Answer:   nephrolithiasis    PSA, Total Screen     This is a patient instruction: This test is non-fasting  Please drink two glasses of water morning of bloodwork          Standing Status:   Future     Standing Expiration Date:   1/31/2021

## 2019-07-31 ENCOUNTER — OFFICE VISIT (OUTPATIENT)
Dept: UROLOGY | Facility: MEDICAL CENTER | Age: 79
End: 2019-07-31
Payer: MEDICARE

## 2019-07-31 VITALS
BODY MASS INDEX: 32.14 KG/M2 | SYSTOLIC BLOOD PRESSURE: 130 MMHG | HEART RATE: 94 BPM | WEIGHT: 200 LBS | HEIGHT: 66 IN | DIASTOLIC BLOOD PRESSURE: 80 MMHG

## 2019-07-31 DIAGNOSIS — Z85.46 HISTORY OF PROSTATE CANCER: Primary | ICD-10-CM

## 2019-07-31 DIAGNOSIS — Z87.438: ICD-10-CM

## 2019-07-31 DIAGNOSIS — N20.0 KIDNEY STONE: ICD-10-CM

## 2019-07-31 PROCEDURE — 1123F ACP DISCUSS/DSCN MKR DOCD: CPT | Performed by: PHYSICIAN ASSISTANT

## 2019-07-31 PROCEDURE — 99213 OFFICE O/P EST LOW 20 MIN: CPT | Performed by: PHYSICIAN ASSISTANT

## 2020-02-21 NOTE — H&P
H&P Exam - Urology   Maikel Cortes 68 y o  male MRN: [id number]  Unit/Bed#:  Encounter: [id number]     Assessment/Plan      Assessment:  1   6 mm right renal calculus-radiopaque, proximal migration after ureteroscopy and laser lithotripsy, now for ESWL-right sized  2  Urge incontinence-the patient has a history of radiation therapy for prostate cancer 6 years ago  He did   have a  degree of urge incontinence immediately after radiation therapy but notes that after the 1st cystoscopy and stent placement for the right ureteral stone back in December of 2017 the patient developed urgency and urge incontinence that was much worse and was exacerbated by the ureteral stent that was in place but continues now despite stent removal   He has had no urinary infection that may be a reason for this but did react rather negatively towards the previous right ureteral stent which is exacerbated his urgency and urge incontinence  Despite attempts at using alpha blockade with Flomax as well as anticholinergics with both did trip an XL and trospium the patient is urgency and urge incontinence is still severe  The patient was given Myrbetriq 50 mg p  o  daily as samples in attempt to control his urgency and urge incontinence  PVR bladder scan today was 120 cc  After better it does not help him then consideration towards Botox intravesical E will be made  3   Adenocarcinoma of the prostate, almost 6 years status post radiation therapy     Reviewing his prostate biopsy which was performed 08/07/2012 he had a Rosey pattern score 6 (3+ 3) in the left lateral apex comprising 30% of the core   Plan:  1  Right renal ESWL  2   Myrbetriq 50 mg p o  daily given his samples     History of Present Illness         HPI:  Maikel Cortes is a 68 y o  male who presents with a history of adenocarcinoma of the prostate treated with IMRT approximately 6 years ago    In December of 2017 the patient presented with right renal colic secondary to a mid ureteral 6 mm calculus  The patient initially underwent cystoscopy and right ureteral stenting however the stent bothered him quite a bit with regard to urgency and frequency with urge incontinence prompting early return to the operating room  With much difficulty the stone was accessed using a ureteral scope and laser lithotripsy applied  Large portion of the stone migrated back into the renal pelvis and the patient now presents for right ESWL  He continues to have urgency and urge incontinence failing to respond adequately to oxybutynin XL as well as trospium  He will try Myrbetriq 50 mg in the this does not work will be considered for intravesical Botox        Review of Systems     Historical Information         Medical History        Past Medical History:   Diagnosis Date    Arthritis      Cancer [facility])      Cardiac disease      Coronary artery disease      Hyperlipidemia      Hypertension      Kidney stone      Prostate CA ([address] )           Surgical History         Past Surgical History:   Procedure Laterality Date    ID CYSTO/URETERO W/LITHOTRIPSY &INDWELL STENT INSRT Right 12/25/2017     Procedure: CYSTOSCOPY URETEROSCOPY WITH LITHOTRIPSY HOLMIUM LASER, RETROGRADE PYELOGRAM AND INSERTION STENT URETERAL;  Surgeon: Cristy Martinez MD;  Location:  MAIN OR;  Service: Urology    ID CYSTO/URETERO W/LITHOTRIPSY &INDWELL STENT INSRT Right 1/19/2018     Procedure: CYSTOSCOPY URETEROSCOPIC STONE EXTRACTION  WITH LITHOTRIPSY HOLMIUM LASER, RETROGRADE PYELOGRAM AND INSERTION STENT URETERAL;  Surgeon: Sarita Dorantes MD;  [facility];  Service: Urology         Social History              History   Alcohol Use    1 2 oz/week    1 Glasses of wine, 1 Cans of beer per week       Comment: occasionally          History   Drug Use No            History   Smoking Status    Current Some Day Smoker    Types: Cigars   Smokeless Tobacco    Current User       Comment: declined      Family History: History reviewed  No pertinent family history      Meds/Allergies   all medications and allergies reviewed        Allergies   Allergen Reactions    Indomethacin Other (See Comments)       Pt unsure of reaction     Lisinopril Other (See Comments)       COUGH    Niacin         Other reaction(s): Other (See Comments)  red, flushed skin    Nystatin      Rosuvastatin Other (See Comments)       MUSCLE ACHES         Objective   Vitals: Blood pressure 122/86, height 5' 6" (1 676 m), weight 88 5 kg (195 lb)      [unfilled]     Invasive Devices            Drain                     Ureteral Drain/Stent Right ureter 22 Fr  84 days      Ureteral Drain/Stent Right ureter 6 Fr  58 days                     Physical Exam     Lab Results: I have personally reviewed pertinent reports  Imaging: I have personally reviewed pertinent reports  EKG, Pathology, and Other Studies: I have personally reviewed pertinent reports  VTE Prophylaxis: Sequential compression device Morena Lindsey      Code Status: [unfilled]  Advance Directive and Living Will:      Power of :    POLST:       Counseling / Coordination of Care  Total floor / unit time spent today 45 minutes  Greater than 50% of total time was spent with the patient and / or family counseling and / or coordination of care  A description of the counseling / coordination of care: Siddharth Bonilla Instructions     Patient Instructions                After Visit Summary (Printed 3/19/2018)   Additional Documentation     Vitals:    /86    Ht 5' 6" (1 676 m)    Wt 88 5 kg (195 lb)    BMI 31 47 kg/m²    BSA 1 98 m²    Flowsheets:    Custom Formula Data       SmartForms:    [facility] PRE-CHARTING [facility] PCMH/PCSP WRAP UP REQUIREMENTS ADVANCED       Encounter Info:    Billing Info,    History,    Allergies,    Detailed Report      Siddharth Bonilla  (3 more)   Communications         Letter sent to Mir Terrell MD   Orders Placed         POCT urine dip auto non-scope Urine culture   Medication Changes         Mirabegron 50 mg Oral Daily       AmLODIPine Besylate                  2 5 mg Oral       Aspirin                  81 mg Tbec, Oral       Ibuprofen                  200 mg Tabs, Oral       Rosuvastatin Calcium                  40 mg Oral                              Medication List   Visit Diagnoses         Calculus of kidney       Prostate cancer [address] [address]      Urge incontinence [FreeTextEntry1] :  \par \par  \par

## 2020-03-18 ENCOUNTER — TELEPHONE (OUTPATIENT)
Dept: UROLOGY | Facility: MEDICAL CENTER | Age: 80
End: 2020-03-18

## 2020-03-18 NOTE — TELEPHONE ENCOUNTER
Patient of Dr Estevan Ellsworth  Patient wife calling to advise patient is bleeding from his penis  Patient states it hurts when touches his penis  Patient states bleeding started 3 days ago

## 2020-03-18 NOTE — TELEPHONE ENCOUNTER
Spoke with pt's wife, pt is c/o bleeding from head of penis  There doesn't appear to be an open wound, no redness or swelling, no pus or drainage  Pt states it only hurts when touched, no pain with urination or at any other time  Has been like this for about 3 days  Please advise  Jesika Kelly aware RML not in office today and may not receive return call until tomorrow

## 2020-03-19 NOTE — TELEPHONE ENCOUNTER
Call placed to patient and spoke with wife as per signed consent form  Informed her of recommendations of Dr Axel Mills  She is aware and will call the office back with any questions or concerns

## 2020-05-06 ENCOUNTER — TELEMEDICINE (OUTPATIENT)
Dept: UROLOGY | Facility: MEDICAL CENTER | Age: 80
End: 2020-05-06
Payer: MEDICARE

## 2020-05-06 DIAGNOSIS — N20.0 KIDNEY STONE: ICD-10-CM

## 2020-05-06 DIAGNOSIS — Z85.46 HISTORY OF PROSTATE CANCER: Primary | ICD-10-CM

## 2020-05-06 DIAGNOSIS — E29.1 HYPOGONADISM IN MALE: ICD-10-CM

## 2020-05-06 PROCEDURE — 99442 PR PHYS/QHP TELEPHONE EVALUATION 11-20 MIN: CPT | Performed by: UROLOGY

## 2020-06-09 ENCOUNTER — APPOINTMENT (EMERGENCY)
Dept: RADIOLOGY | Facility: HOSPITAL | Age: 80
End: 2020-06-09
Payer: MEDICARE

## 2020-06-09 ENCOUNTER — HOSPITAL ENCOUNTER (EMERGENCY)
Facility: HOSPITAL | Age: 80
Discharge: HOME | End: 2020-06-09
Attending: EMERGENCY MEDICINE
Payer: MEDICARE

## 2020-06-09 VITALS
RESPIRATION RATE: 20 BRPM | WEIGHT: 212.96 LBS | TEMPERATURE: 98 F | SYSTOLIC BLOOD PRESSURE: 148 MMHG | DIASTOLIC BLOOD PRESSURE: 96 MMHG | BODY MASS INDEX: 34.9 KG/M2 | OXYGEN SATURATION: 96 % | HEART RATE: 86 BPM

## 2020-06-09 DIAGNOSIS — R06.02 SOB (SHORTNESS OF BREATH): Primary | ICD-10-CM

## 2020-06-09 DIAGNOSIS — Z91.14 NON COMPLIANCE W MEDICATION REGIMEN: ICD-10-CM

## 2020-06-09 LAB
ALBUMIN SERPL BCP-MCNC: 3.7 G/DL (ref 3.5–5)
ALP SERPL-CCNC: 56 U/L (ref 46–116)
ALT SERPL W P-5'-P-CCNC: 29 U/L (ref 12–78)
ANION GAP SERPL CALCULATED.3IONS-SCNC: 13 MMOL/L (ref 4–13)
AST SERPL W P-5'-P-CCNC: 23 U/L (ref 5–45)
BASOPHILS # BLD AUTO: 0.04 THOUSANDS/ΜL (ref 0–0.1)
BASOPHILS NFR BLD AUTO: 1 % (ref 0–1)
BILIRUB SERPL-MCNC: 0.62 MG/DL (ref 0.2–1)
BUN SERPL-MCNC: 17 MG/DL (ref 5–25)
CALCIUM SERPL-MCNC: 8.8 MG/DL (ref 8.3–10.1)
CHLORIDE SERPL-SCNC: 104 MMOL/L (ref 100–108)
CO2 SERPL-SCNC: 23 MMOL/L (ref 21–32)
CREAT SERPL-MCNC: 1.13 MG/DL (ref 0.6–1.3)
EOSINOPHIL # BLD AUTO: 0.21 THOUSAND/ΜL (ref 0–0.61)
EOSINOPHIL NFR BLD AUTO: 3 % (ref 0–6)
ERYTHROCYTE [DISTWIDTH] IN BLOOD BY AUTOMATED COUNT: 13.1 % (ref 11.6–15.1)
GFR SERPL CREATININE-BSD FRML MDRD: 61 ML/MIN/1.73SQ M
GLUCOSE SERPL-MCNC: 158 MG/DL (ref 65–140)
HCT VFR BLD AUTO: 39.8 % (ref 36.5–49.3)
HGB BLD-MCNC: 13.4 G/DL (ref 12–17)
IMM GRANULOCYTES # BLD AUTO: 0.02 THOUSAND/UL (ref 0–0.2)
IMM GRANULOCYTES NFR BLD AUTO: 0 % (ref 0–2)
LYMPHOCYTES # BLD AUTO: 1.5 THOUSANDS/ΜL (ref 0.6–4.47)
LYMPHOCYTES NFR BLD AUTO: 23 % (ref 14–44)
MCH RBC QN AUTO: 32.1 PG (ref 26.8–34.3)
MCHC RBC AUTO-ENTMCNC: 33.7 G/DL (ref 31.4–37.4)
MCV RBC AUTO: 95 FL (ref 82–98)
MONOCYTES # BLD AUTO: 0.87 THOUSAND/ΜL (ref 0.17–1.22)
MONOCYTES NFR BLD AUTO: 13 % (ref 4–12)
NEUTROPHILS # BLD AUTO: 3.99 THOUSANDS/ΜL (ref 1.85–7.62)
NEUTS SEG NFR BLD AUTO: 60 % (ref 43–75)
NRBC BLD AUTO-RTO: 0 /100 WBCS
PLATELET # BLD AUTO: 177 THOUSANDS/UL (ref 149–390)
PMV BLD AUTO: 10 FL (ref 8.9–12.7)
POTASSIUM SERPL-SCNC: 3.2 MMOL/L (ref 3.5–5.3)
PROT SERPL-MCNC: 6.9 G/DL (ref 6.4–8.2)
RBC # BLD AUTO: 4.18 MILLION/UL (ref 3.88–5.62)
SODIUM SERPL-SCNC: 140 MMOL/L (ref 136–145)
TROPONIN I SERPL-MCNC: <0.02 NG/ML
WBC # BLD AUTO: 6.63 THOUSAND/UL (ref 4.31–10.16)

## 2020-06-09 PROCEDURE — 71045 X-RAY EXAM CHEST 1 VIEW: CPT

## 2020-06-09 PROCEDURE — 36415 COLL VENOUS BLD VENIPUNCTURE: CPT | Performed by: EMERGENCY MEDICINE

## 2020-06-09 PROCEDURE — 85025 COMPLETE CBC W/AUTO DIFF WBC: CPT | Performed by: EMERGENCY MEDICINE

## 2020-06-09 PROCEDURE — 93005 ELECTROCARDIOGRAM TRACING: CPT

## 2020-06-09 PROCEDURE — 84484 ASSAY OF TROPONIN QUANT: CPT | Performed by: EMERGENCY MEDICINE

## 2020-06-09 PROCEDURE — 99285 EMERGENCY DEPT VISIT HI MDM: CPT

## 2020-06-09 PROCEDURE — 80053 COMPREHEN METABOLIC PANEL: CPT | Performed by: EMERGENCY MEDICINE

## 2020-06-09 PROCEDURE — 99284 EMERGENCY DEPT VISIT MOD MDM: CPT | Performed by: EMERGENCY MEDICINE

## 2020-06-09 RX ORDER — CARVEDILOL 12.5 MG/1
12.5 TABLET ORAL ONCE
Status: COMPLETED | OUTPATIENT
Start: 2020-06-09 | End: 2020-06-09

## 2020-06-09 RX ORDER — LORAZEPAM 0.5 MG/1
0.5 TABLET ORAL ONCE
Status: COMPLETED | OUTPATIENT
Start: 2020-06-09 | End: 2020-06-09

## 2020-06-09 RX ORDER — FUROSEMIDE 40 MG/1
20 TABLET ORAL ONCE
Status: COMPLETED | OUTPATIENT
Start: 2020-06-09 | End: 2020-06-09

## 2020-06-09 RX ORDER — AMLODIPINE BESYLATE 2.5 MG/1
2.5 TABLET ORAL ONCE
Status: COMPLETED | OUTPATIENT
Start: 2020-06-09 | End: 2020-06-09

## 2020-06-09 RX ADMIN — FUROSEMIDE 20 MG: 40 TABLET ORAL at 18:08

## 2020-06-09 RX ADMIN — LORAZEPAM 0.5 MG: 0.5 TABLET ORAL at 18:06

## 2020-06-09 RX ADMIN — AMLODIPINE BESYLATE 2.5 MG: 2.5 TABLET ORAL at 18:20

## 2020-06-09 RX ADMIN — CARVEDILOL 12.5 MG: 12.5 TABLET, FILM COATED ORAL at 18:20

## 2020-06-10 LAB
ATRIAL RATE: 83 BPM
ATRIAL RATE: 90 BPM
P AXIS: 93 DEGREES
PR INTERVAL: 164 MS
QRS AXIS: -35 DEGREES
QRS AXIS: -37 DEGREES
QRSD INTERVAL: 100 MS
QRSD INTERVAL: 98 MS
QT INTERVAL: 332 MS
QT INTERVAL: 368 MS
QTC INTERVAL: 408 MS
QTC INTERVAL: 474 MS
T WAVE AXIS: 130 DEGREES
T WAVE AXIS: 82 DEGREES
VENTRICULAR RATE: 100 BPM
VENTRICULAR RATE: 91 BPM

## 2020-06-10 PROCEDURE — 93010 ELECTROCARDIOGRAM REPORT: CPT | Performed by: INTERNAL MEDICINE

## 2020-11-01 ENCOUNTER — HOSPITAL ENCOUNTER (EMERGENCY)
Facility: HOSPITAL | Age: 80
Discharge: HOME | End: 2020-11-01
Attending: EMERGENCY MEDICINE
Payer: MEDICARE

## 2020-11-01 VITALS
SYSTOLIC BLOOD PRESSURE: 139 MMHG | HEIGHT: 67 IN | HEART RATE: 78 BPM | OXYGEN SATURATION: 95 % | BODY MASS INDEX: 32.18 KG/M2 | TEMPERATURE: 98.6 F | WEIGHT: 205 LBS | DIASTOLIC BLOOD PRESSURE: 99 MMHG | RESPIRATION RATE: 18 BRPM

## 2020-11-01 DIAGNOSIS — B35.6 TINEA CRURIS: ICD-10-CM

## 2020-11-01 DIAGNOSIS — N48.1 BALANITIS: Primary | ICD-10-CM

## 2020-11-01 LAB
BACTERIA UR QL AUTO: NORMAL /HPF
BILIRUB UR QL STRIP: NEGATIVE
CLARITY UR: CLEAR
COLOR UR: YELLOW
GLUCOSE SERPL-MCNC: 117 MG/DL (ref 65–140)
GLUCOSE UR STRIP-MCNC: NEGATIVE MG/DL
HGB UR QL STRIP.AUTO: NEGATIVE
HYALINE CASTS #/AREA URNS LPF: NORMAL /LPF
KETONES UR STRIP-MCNC: NEGATIVE MG/DL
LEUKOCYTE ESTERASE UR QL STRIP: NEGATIVE
NITRITE UR QL STRIP: NEGATIVE
NON-SQ EPI CELLS URNS QL MICRO: NORMAL /HPF
PH UR STRIP.AUTO: 5.5 [PH]
PROT UR STRIP-MCNC: ABNORMAL MG/DL
RBC #/AREA URNS AUTO: NORMAL /HPF
SP GR UR STRIP.AUTO: 1.02 (ref 1–1.03)
UROBILINOGEN UR QL STRIP.AUTO: 0.2 E.U./DL
WBC #/AREA URNS AUTO: NORMAL /HPF

## 2020-11-01 PROCEDURE — 81001 URINALYSIS AUTO W/SCOPE: CPT | Performed by: EMERGENCY MEDICINE

## 2020-11-01 PROCEDURE — 99283 EMERGENCY DEPT VISIT LOW MDM: CPT

## 2020-11-01 PROCEDURE — 99284 EMERGENCY DEPT VISIT MOD MDM: CPT | Performed by: EMERGENCY MEDICINE

## 2020-11-01 PROCEDURE — 82948 REAGENT STRIP/BLOOD GLUCOSE: CPT

## 2020-11-01 RX ORDER — CLOTRIMAZOLE 1 %
CREAM (GRAM) TOPICAL
Qty: 12 G | Refills: 0 | Status: SHIPPED | OUTPATIENT
Start: 2020-11-01 | End: 2020-11-07

## 2020-11-01 RX ORDER — CLOTRIMAZOLE 1 %
CREAM (GRAM) TOPICAL 2 TIMES DAILY
Status: DISCONTINUED | OUTPATIENT
Start: 2020-11-01 | End: 2020-11-01 | Stop reason: HOSPADM

## 2020-11-01 RX ADMIN — MUPIROCIN 1 APPLICATION: 20 OINTMENT TOPICAL at 15:54

## 2020-11-01 RX ADMIN — CLOTRIMAZOLE: 10 CREAM TOPICAL at 15:54

## 2020-12-06 ENCOUNTER — APPOINTMENT (EMERGENCY)
Dept: RADIOLOGY | Facility: HOSPITAL | Age: 80
End: 2020-12-06
Payer: MEDICARE

## 2020-12-06 ENCOUNTER — HOSPITAL ENCOUNTER (EMERGENCY)
Facility: HOSPITAL | Age: 80
Discharge: HOME | End: 2020-12-06
Attending: EMERGENCY MEDICINE | Admitting: EMERGENCY MEDICINE
Payer: MEDICARE

## 2020-12-06 ENCOUNTER — TELEPHONE (OUTPATIENT)
Dept: UROLOGY | Facility: AMBULATORY SURGERY CENTER | Age: 80
End: 2020-12-06

## 2020-12-06 VITALS
OXYGEN SATURATION: 96 % | WEIGHT: 205 LBS | SYSTOLIC BLOOD PRESSURE: 167 MMHG | TEMPERATURE: 98.2 F | DIASTOLIC BLOOD PRESSURE: 99 MMHG | BODY MASS INDEX: 32.18 KG/M2 | RESPIRATION RATE: 18 BRPM | HEIGHT: 67 IN | HEART RATE: 97 BPM

## 2020-12-06 DIAGNOSIS — N20.1 URETEROLITHIASIS: ICD-10-CM

## 2020-12-06 DIAGNOSIS — N17.9 AKI (ACUTE KIDNEY INJURY) (HCC): Primary | ICD-10-CM

## 2020-12-06 DIAGNOSIS — N13.30 HYDRONEPHROSIS: ICD-10-CM

## 2020-12-06 LAB
ALBUMIN SERPL BCP-MCNC: 3.7 G/DL (ref 3.5–5)
ALP SERPL-CCNC: 57 U/L (ref 46–116)
ALT SERPL W P-5'-P-CCNC: 44 U/L (ref 12–78)
ANION GAP SERPL CALCULATED.3IONS-SCNC: 7 MMOL/L (ref 4–13)
AST SERPL W P-5'-P-CCNC: 19 U/L (ref 5–45)
BACTERIA UR QL AUTO: ABNORMAL /HPF
BASOPHILS # BLD AUTO: 0.03 THOUSANDS/ΜL (ref 0–0.1)
BASOPHILS NFR BLD AUTO: 0 % (ref 0–1)
BILIRUB DIRECT SERPL-MCNC: 0.22 MG/DL (ref 0–0.2)
BILIRUB SERPL-MCNC: 0.85 MG/DL (ref 0.2–1)
BILIRUB UR QL STRIP: NEGATIVE
BUN SERPL-MCNC: 21 MG/DL (ref 5–25)
CALCIUM SERPL-MCNC: 9.2 MG/DL (ref 8.3–10.1)
CHLORIDE SERPL-SCNC: 107 MMOL/L (ref 100–108)
CLARITY UR: CLEAR
CO2 SERPL-SCNC: 27 MMOL/L (ref 21–32)
COLOR UR: YELLOW
COLOR, POC: YELLOW
CREAT SERPL-MCNC: 1.65 MG/DL (ref 0.6–1.3)
EOSINOPHIL # BLD AUTO: 0.17 THOUSAND/ΜL (ref 0–0.61)
EOSINOPHIL NFR BLD AUTO: 2 % (ref 0–6)
ERYTHROCYTE [DISTWIDTH] IN BLOOD BY AUTOMATED COUNT: 13.2 % (ref 11.6–15.1)
GFR SERPL CREATININE-BSD FRML MDRD: 39 ML/MIN/1.73SQ M
GLUCOSE SERPL-MCNC: 133 MG/DL (ref 65–140)
GLUCOSE UR STRIP-MCNC: NEGATIVE MG/DL
HCT VFR BLD AUTO: 44.8 % (ref 36.5–49.3)
HGB BLD-MCNC: 14.9 G/DL (ref 12–17)
HGB UR QL STRIP.AUTO: ABNORMAL
HYALINE CASTS #/AREA URNS LPF: ABNORMAL /LPF
IMM GRANULOCYTES # BLD AUTO: 0.03 THOUSAND/UL (ref 0–0.2)
IMM GRANULOCYTES NFR BLD AUTO: 0 % (ref 0–2)
KETONES UR STRIP-MCNC: NEGATIVE MG/DL
LEUKOCYTE ESTERASE UR QL STRIP: NEGATIVE
LIPASE SERPL-CCNC: 83 U/L (ref 73–393)
LYMPHOCYTES # BLD AUTO: 1.06 THOUSANDS/ΜL (ref 0.6–4.47)
LYMPHOCYTES NFR BLD AUTO: 13 % (ref 14–44)
MCH RBC QN AUTO: 31 PG (ref 26.8–34.3)
MCHC RBC AUTO-ENTMCNC: 33.3 G/DL (ref 31.4–37.4)
MCV RBC AUTO: 93 FL (ref 82–98)
MONOCYTES # BLD AUTO: 1.25 THOUSAND/ΜL (ref 0.17–1.22)
MONOCYTES NFR BLD AUTO: 15 % (ref 4–12)
NEUTROPHILS # BLD AUTO: 5.7 THOUSANDS/ΜL (ref 1.85–7.62)
NEUTS SEG NFR BLD AUTO: 70 % (ref 43–75)
NITRITE UR QL STRIP: NEGATIVE
NON-SQ EPI CELLS URNS QL MICRO: ABNORMAL /HPF
NRBC BLD AUTO-RTO: 0 /100 WBCS
PH UR STRIP.AUTO: 6 [PH] (ref 4.5–8)
PLATELET # BLD AUTO: 159 THOUSANDS/UL (ref 149–390)
PMV BLD AUTO: 9.5 FL (ref 8.9–12.7)
POTASSIUM SERPL-SCNC: 3.9 MMOL/L (ref 3.5–5.3)
PROT SERPL-MCNC: 7.4 G/DL (ref 6.4–8.2)
PROT UR STRIP-MCNC: ABNORMAL MG/DL
RBC # BLD AUTO: 4.8 MILLION/UL (ref 3.88–5.62)
RBC #/AREA URNS AUTO: ABNORMAL /HPF
SODIUM SERPL-SCNC: 141 MMOL/L (ref 136–145)
SP GR UR STRIP.AUTO: 1.02 (ref 1–1.03)
UROBILINOGEN UR QL STRIP.AUTO: 1 E.U./DL
WBC # BLD AUTO: 8.24 THOUSAND/UL (ref 4.31–10.16)
WBC #/AREA URNS AUTO: ABNORMAL /HPF

## 2020-12-06 PROCEDURE — 81001 URINALYSIS AUTO W/SCOPE: CPT

## 2020-12-06 PROCEDURE — 74176 CT ABD & PELVIS W/O CONTRAST: CPT

## 2020-12-06 PROCEDURE — 96374 THER/PROPH/DIAG INJ IV PUSH: CPT

## 2020-12-06 PROCEDURE — 80048 BASIC METABOLIC PNL TOTAL CA: CPT | Performed by: EMERGENCY MEDICINE

## 2020-12-06 PROCEDURE — 99284 EMERGENCY DEPT VISIT MOD MDM: CPT

## 2020-12-06 PROCEDURE — 83690 ASSAY OF LIPASE: CPT | Performed by: EMERGENCY MEDICINE

## 2020-12-06 PROCEDURE — 99285 EMERGENCY DEPT VISIT HI MDM: CPT | Performed by: EMERGENCY MEDICINE

## 2020-12-06 PROCEDURE — 80076 HEPATIC FUNCTION PANEL: CPT | Performed by: EMERGENCY MEDICINE

## 2020-12-06 PROCEDURE — G1004 CDSM NDSC: HCPCS

## 2020-12-06 PROCEDURE — 36415 COLL VENOUS BLD VENIPUNCTURE: CPT | Performed by: EMERGENCY MEDICINE

## 2020-12-06 PROCEDURE — 85025 COMPLETE CBC W/AUTO DIFF WBC: CPT | Performed by: EMERGENCY MEDICINE

## 2020-12-06 RX ORDER — TAMSULOSIN HYDROCHLORIDE 0.4 MG/1
0.4 CAPSULE ORAL
Qty: 14 CAPSULE | Refills: 0 | Status: ON HOLD | OUTPATIENT
Start: 2020-12-06 | End: 2021-07-06 | Stop reason: ALTCHOICE

## 2020-12-06 RX ORDER — ACETAMINOPHEN 325 MG/1
975 TABLET ORAL ONCE
Status: COMPLETED | OUTPATIENT
Start: 2020-12-06 | End: 2020-12-06

## 2020-12-06 RX ORDER — OXYCODONE HYDROCHLORIDE AND ACETAMINOPHEN 5; 325 MG/1; MG/1
1 TABLET ORAL EVERY 8 HOURS PRN
Qty: 6 TABLET | Refills: 0 | Status: SHIPPED | OUTPATIENT
Start: 2020-12-06 | End: 2020-12-08

## 2020-12-06 RX ORDER — FENTANYL CITRATE 50 UG/ML
50 INJECTION, SOLUTION INTRAMUSCULAR; INTRAVENOUS ONCE
Status: COMPLETED | OUTPATIENT
Start: 2020-12-06 | End: 2020-12-06

## 2020-12-06 RX ADMIN — FENTANYL CITRATE 50 MCG: 50 INJECTION INTRAMUSCULAR; INTRAVENOUS at 15:25

## 2020-12-06 RX ADMIN — ACETAMINOPHEN 975 MG: 325 TABLET, FILM COATED ORAL at 15:25

## 2020-12-11 ENCOUNTER — TELEPHONE (OUTPATIENT)
Dept: UROLOGY | Facility: MEDICAL CENTER | Age: 80
End: 2020-12-11

## 2020-12-11 NOTE — TELEPHONE ENCOUNTER
Patient's daughter Flako Pepe calling patient passed stone and will bring it when he comes in for his appt on Friday 12/18/20  Patient would like to know if he needs to continue taking Flomax   Please advise [phone]

## 2020-12-17 RX ORDER — ESCITALOPRAM OXALATE 10 MG/1
10 TABLET ORAL DAILY
COMMUNITY
Start: 2020-10-26

## 2020-12-18 ENCOUNTER — OFFICE VISIT (OUTPATIENT)
Dept: UROLOGY | Facility: MEDICAL CENTER | Age: 80
End: 2020-12-18
Payer: MEDICARE

## 2020-12-18 VITALS
WEIGHT: 198 LBS | BODY MASS INDEX: 31.08 KG/M2 | DIASTOLIC BLOOD PRESSURE: 90 MMHG | HEIGHT: 67 IN | SYSTOLIC BLOOD PRESSURE: 150 MMHG | HEART RATE: 105 BPM

## 2020-12-18 DIAGNOSIS — N28.1 RENAL CYST: ICD-10-CM

## 2020-12-18 DIAGNOSIS — N17.9 AKI (ACUTE KIDNEY INJURY) (HCC): ICD-10-CM

## 2020-12-18 DIAGNOSIS — N20.0 KIDNEY STONE: Primary | ICD-10-CM

## 2020-12-18 DIAGNOSIS — Z85.46 HISTORY OF PROSTATE CANCER: ICD-10-CM

## 2020-12-18 PROCEDURE — 82360 CALCULUS ASSAY QUANT: CPT | Performed by: NURSE PRACTITIONER

## 2020-12-18 PROCEDURE — 99213 OFFICE O/P EST LOW 20 MIN: CPT | Performed by: NURSE PRACTITIONER

## 2020-12-31 LAB
COLOR STONE: NORMAL
COM MFR STONE: 20 %
COMMENT-STONE3: NORMAL
COMPOSITION: NORMAL
LABORATORY COMMENT REPORT: NORMAL
PHOTO: NORMAL
SIZE STONE: NORMAL MM
SPEC SOURCE SUBJ: NORMAL
STONE ANALYSIS-IMP: NORMAL
URATE MFR STONE: 80 %
WT STONE: 28 MG

## 2021-01-30 ENCOUNTER — IMMUNIZATIONS (OUTPATIENT)
Dept: FAMILY MEDICINE CLINIC | Facility: HOSPITAL | Age: 81
End: 2021-01-30

## 2021-01-30 DIAGNOSIS — Z23 ENCOUNTER FOR IMMUNIZATION: Primary | ICD-10-CM

## 2021-01-30 PROCEDURE — 0001A SARS-COV-2 / COVID-19 MRNA VACCINE (PFIZER-BIONTECH) 30 MCG: CPT

## 2021-01-30 PROCEDURE — 91300 SARS-COV-2 / COVID-19 MRNA VACCINE (PFIZER-BIONTECH) 30 MCG: CPT

## 2021-02-20 ENCOUNTER — IMMUNIZATIONS (OUTPATIENT)
Dept: FAMILY MEDICINE CLINIC | Facility: HOSPITAL | Age: 81
End: 2021-02-20

## 2021-02-20 DIAGNOSIS — Z23 ENCOUNTER FOR IMMUNIZATION: Primary | ICD-10-CM

## 2021-02-20 PROCEDURE — 91300 SARS-COV-2 / COVID-19 MRNA VACCINE (PFIZER-BIONTECH) 30 MCG: CPT

## 2021-02-20 PROCEDURE — 0002A SARS-COV-2 / COVID-19 MRNA VACCINE (PFIZER-BIONTECH) 30 MCG: CPT

## 2021-06-14 ENCOUNTER — PREP FOR PROCEDURE (OUTPATIENT)
Dept: UROLOGY | Facility: MEDICAL CENTER | Age: 81
End: 2021-06-14

## 2021-06-14 ENCOUNTER — APPOINTMENT (EMERGENCY)
Dept: RADIOLOGY | Facility: HOSPITAL | Age: 81
End: 2021-06-14
Payer: MEDICARE

## 2021-06-14 ENCOUNTER — ANESTHESIA (OUTPATIENT)
Dept: PERIOP | Facility: HOSPITAL | Age: 81
End: 2021-06-14
Payer: MEDICARE

## 2021-06-14 ENCOUNTER — TELEPHONE (OUTPATIENT)
Dept: OTHER | Facility: HOSPITAL | Age: 81
End: 2021-06-14

## 2021-06-14 ENCOUNTER — ANESTHESIA EVENT (OUTPATIENT)
Dept: PERIOP | Facility: HOSPITAL | Age: 81
End: 2021-06-14
Payer: MEDICARE

## 2021-06-14 ENCOUNTER — APPOINTMENT (OUTPATIENT)
Dept: RADIOLOGY | Facility: HOSPITAL | Age: 81
End: 2021-06-14
Payer: MEDICARE

## 2021-06-14 ENCOUNTER — HOSPITAL ENCOUNTER (OUTPATIENT)
Facility: HOSPITAL | Age: 81
Setting detail: OBSERVATION
Discharge: HOME | End: 2021-06-15
Attending: EMERGENCY MEDICINE | Admitting: INTERNAL MEDICINE
Payer: MEDICARE

## 2021-06-14 DIAGNOSIS — N20.1 URETERAL CALCULUS, LEFT: Primary | ICD-10-CM

## 2021-06-14 DIAGNOSIS — N20.0 LEFT NEPHROLITHIASIS: ICD-10-CM

## 2021-06-14 DIAGNOSIS — N13.30 HYDRONEPHROSIS OF LEFT KIDNEY: Primary | ICD-10-CM

## 2021-06-14 DIAGNOSIS — N13.30 HYDRONEPHROSIS: ICD-10-CM

## 2021-06-14 PROBLEM — R06.09 DOE (DYSPNEA ON EXERTION): Status: ACTIVE | Noted: 2021-06-14

## 2021-06-14 PROBLEM — R06.00 DOE (DYSPNEA ON EXERTION): Status: ACTIVE | Noted: 2021-06-14

## 2021-06-14 LAB
ALBUMIN SERPL BCP-MCNC: 3.8 G/DL (ref 3.5–5)
ALP SERPL-CCNC: 49 U/L (ref 46–116)
ALT SERPL W P-5'-P-CCNC: 25 U/L (ref 12–78)
ANION GAP SERPL CALCULATED.3IONS-SCNC: 10 MMOL/L (ref 4–13)
ANION GAP SERPL CALCULATED.3IONS-SCNC: 8 MMOL/L (ref 4–13)
APTT PPP: 25 SECONDS (ref 23–37)
AST SERPL W P-5'-P-CCNC: 89 U/L (ref 5–45)
BASOPHILS # BLD AUTO: 0.04 THOUSANDS/ΜL (ref 0–0.1)
BASOPHILS NFR BLD AUTO: 0 % (ref 0–1)
BILIRUB SERPL-MCNC: 1.22 MG/DL (ref 0.2–1)
BUN SERPL-MCNC: 18 MG/DL (ref 5–25)
BUN SERPL-MCNC: 23 MG/DL (ref 5–25)
CALCIUM SERPL-MCNC: 7.4 MG/DL (ref 8.3–10.1)
CALCIUM SERPL-MCNC: 9.6 MG/DL (ref 8.3–10.1)
CHLORIDE SERPL-SCNC: 100 MMOL/L (ref 100–108)
CHLORIDE SERPL-SCNC: 104 MMOL/L (ref 100–108)
CO2 SERPL-SCNC: 24 MMOL/L (ref 21–32)
CO2 SERPL-SCNC: 26 MMOL/L (ref 21–32)
CREAT SERPL-MCNC: 1.27 MG/DL (ref 0.6–1.3)
CREAT SERPL-MCNC: 1.87 MG/DL (ref 0.6–1.3)
EOSINOPHIL # BLD AUTO: 0.08 THOUSAND/ΜL (ref 0–0.61)
EOSINOPHIL NFR BLD AUTO: 1 % (ref 0–6)
ERYTHROCYTE [DISTWIDTH] IN BLOOD BY AUTOMATED COUNT: 13.9 % (ref 11.6–15.1)
GFR SERPL CREATININE-BSD FRML MDRD: 33 ML/MIN/1.73SQ M
GFR SERPL CREATININE-BSD FRML MDRD: 53 ML/MIN/1.73SQ M
GLUCOSE SERPL-MCNC: 104 MG/DL (ref 65–140)
GLUCOSE SERPL-MCNC: 194 MG/DL (ref 65–140)
HCT VFR BLD AUTO: 37.1 % (ref 36.5–49.3)
HGB BLD-MCNC: 12.3 G/DL (ref 12–17)
IMM GRANULOCYTES # BLD AUTO: 0.03 THOUSAND/UL (ref 0–0.2)
IMM GRANULOCYTES NFR BLD AUTO: 0 % (ref 0–2)
INR PPP: 1.03 (ref 0.84–1.19)
LIPASE SERPL-CCNC: 14 U/L (ref 73–393)
LYMPHOCYTES # BLD AUTO: 0.8 THOUSANDS/ΜL (ref 0.6–4.47)
LYMPHOCYTES NFR BLD AUTO: 8 % (ref 14–44)
MCH RBC QN AUTO: 32.2 PG (ref 26.8–34.3)
MCHC RBC AUTO-ENTMCNC: 33.2 G/DL (ref 31.4–37.4)
MCV RBC AUTO: 97 FL (ref 82–98)
MONOCYTES # BLD AUTO: 1.17 THOUSAND/ΜL (ref 0.17–1.22)
MONOCYTES NFR BLD AUTO: 12 % (ref 4–12)
NEUTROPHILS # BLD AUTO: 7.87 THOUSANDS/ΜL (ref 1.85–7.62)
NEUTS SEG NFR BLD AUTO: 79 % (ref 43–75)
NRBC BLD AUTO-RTO: 0 /100 WBCS
PLATELET # BLD AUTO: 143 THOUSANDS/UL (ref 149–390)
PLATELET # BLD AUTO: 166 THOUSANDS/UL (ref 149–390)
PMV BLD AUTO: 9.8 FL (ref 8.9–12.7)
PMV BLD AUTO: 9.9 FL (ref 8.9–12.7)
POTASSIUM SERPL-SCNC: 4.5 MMOL/L (ref 3.5–5.3)
POTASSIUM SERPL-SCNC: 5.5 MMOL/L (ref 3.5–5.3)
PROT SERPL-MCNC: 8 G/DL (ref 6.4–8.2)
PROTHROMBIN TIME: 13.6 SECONDS (ref 11.6–14.5)
RBC # BLD AUTO: 3.82 MILLION/UL (ref 3.88–5.62)
SODIUM SERPL-SCNC: 134 MMOL/L (ref 136–145)
SODIUM SERPL-SCNC: 138 MMOL/L (ref 136–145)
WBC # BLD AUTO: 9.99 THOUSAND/UL (ref 4.31–10.16)

## 2021-06-14 PROCEDURE — 99285 EMERGENCY DEPT VISIT HI MDM: CPT

## 2021-06-14 PROCEDURE — 99285 EMERGENCY DEPT VISIT HI MDM: CPT | Performed by: EMERGENCY MEDICINE

## 2021-06-14 PROCEDURE — 99215 OFFICE O/P EST HI 40 MIN: CPT | Performed by: UROLOGY

## 2021-06-14 PROCEDURE — 52332 CYSTOSCOPY AND TREATMENT: CPT | Performed by: UROLOGY

## 2021-06-14 PROCEDURE — C2617 STENT, NON-COR, TEM W/O DEL: HCPCS | Performed by: UROLOGY

## 2021-06-14 PROCEDURE — 83690 ASSAY OF LIPASE: CPT | Performed by: EMERGENCY MEDICINE

## 2021-06-14 PROCEDURE — 85610 PROTHROMBIN TIME: CPT | Performed by: INTERNAL MEDICINE

## 2021-06-14 PROCEDURE — C1758 CATHETER, URETERAL: HCPCS | Performed by: UROLOGY

## 2021-06-14 PROCEDURE — 74176 CT ABD & PELVIS W/O CONTRAST: CPT

## 2021-06-14 PROCEDURE — 80048 BASIC METABOLIC PNL TOTAL CA: CPT | Performed by: UROLOGY

## 2021-06-14 PROCEDURE — RECHECK: Performed by: INTERNAL MEDICINE

## 2021-06-14 PROCEDURE — 36415 COLL VENOUS BLD VENIPUNCTURE: CPT | Performed by: EMERGENCY MEDICINE

## 2021-06-14 PROCEDURE — 96374 THER/PROPH/DIAG INJ IV PUSH: CPT

## 2021-06-14 PROCEDURE — 85049 AUTOMATED PLATELET COUNT: CPT | Performed by: INTERNAL MEDICINE

## 2021-06-14 PROCEDURE — 80053 COMPREHEN METABOLIC PANEL: CPT | Performed by: EMERGENCY MEDICINE

## 2021-06-14 PROCEDURE — 87086 URINE CULTURE/COLONY COUNT: CPT | Performed by: UROLOGY

## 2021-06-14 PROCEDURE — 96375 TX/PRO/DX INJ NEW DRUG ADDON: CPT

## 2021-06-14 PROCEDURE — 96365 THER/PROPH/DIAG IV INF INIT: CPT

## 2021-06-14 PROCEDURE — 85730 THROMBOPLASTIN TIME PARTIAL: CPT | Performed by: INTERNAL MEDICINE

## 2021-06-14 PROCEDURE — 74420 UROGRAPHY RTRGR +-KUB: CPT

## 2021-06-14 PROCEDURE — 85025 COMPLETE CBC W/AUTO DIFF WBC: CPT | Performed by: EMERGENCY MEDICINE

## 2021-06-14 PROCEDURE — 99220 PR INITIAL OBSERVATION CARE/DAY 70 MINUTES: CPT | Performed by: INTERNAL MEDICINE

## 2021-06-14 DEVICE — VARIABLE LENGTH INJECTION STENT SET
Type: IMPLANTABLE DEVICE | Site: URETER | Status: FUNCTIONAL
Brand: CONTOUR VL™ INJECTION STENT SET

## 2021-06-14 RX ORDER — SODIUM CHLORIDE, SODIUM LACTATE, POTASSIUM CHLORIDE, CALCIUM CHLORIDE 600; 310; 30; 20 MG/100ML; MG/100ML; MG/100ML; MG/100ML
INJECTION, SOLUTION INTRAVENOUS CONTINUOUS PRN
Status: DISCONTINUED | OUTPATIENT
Start: 2021-06-14 | End: 2021-06-14

## 2021-06-14 RX ORDER — SODIUM CHLORIDE, SODIUM LACTATE, POTASSIUM CHLORIDE, CALCIUM CHLORIDE 600; 310; 30; 20 MG/100ML; MG/100ML; MG/100ML; MG/100ML
75 INJECTION, SOLUTION INTRAVENOUS CONTINUOUS
Status: DISCONTINUED | OUTPATIENT
Start: 2021-06-14 | End: 2021-06-15 | Stop reason: HOSPADM

## 2021-06-14 RX ORDER — CARVEDILOL 12.5 MG/1
12.5 TABLET ORAL 2 TIMES DAILY WITH MEALS
Status: DISCONTINUED | OUTPATIENT
Start: 2021-06-14 | End: 2021-06-15 | Stop reason: HOSPADM

## 2021-06-14 RX ORDER — PANTOPRAZOLE SODIUM 40 MG/1
40 TABLET, DELAYED RELEASE ORAL
Status: DISCONTINUED | OUTPATIENT
Start: 2021-06-14 | End: 2021-06-15 | Stop reason: HOSPADM

## 2021-06-14 RX ORDER — NICOTINE 21 MG/24HR
1 PATCH, TRANSDERMAL 24 HOURS TRANSDERMAL DAILY
Status: DISCONTINUED | OUTPATIENT
Start: 2021-06-14 | End: 2021-06-15 | Stop reason: HOSPADM

## 2021-06-14 RX ORDER — SODIUM CHLORIDE, SODIUM GLUCONATE, SODIUM ACETATE, POTASSIUM CHLORIDE, MAGNESIUM CHLORIDE, SODIUM PHOSPHATE, DIBASIC, AND POTASSIUM PHOSPHATE .53; .5; .37; .037; .03; .012; .00082 G/100ML; G/100ML; G/100ML; G/100ML; G/100ML; G/100ML; G/100ML
1000 INJECTION, SOLUTION INTRAVENOUS ONCE
Status: COMPLETED | OUTPATIENT
Start: 2021-06-14 | End: 2021-06-14

## 2021-06-14 RX ORDER — CEFAZOLIN SODIUM 1 G/50ML
1000 SOLUTION INTRAVENOUS EVERY 12 HOURS
Status: CANCELLED | OUTPATIENT
Start: 2021-06-14

## 2021-06-14 RX ORDER — HYDROMORPHONE HCL IN WATER/PF 6 MG/30 ML
0.2 PATIENT CONTROLLED ANALGESIA SYRINGE INTRAVENOUS EVERY 4 HOURS PRN
Status: DISCONTINUED | OUTPATIENT
Start: 2021-06-14 | End: 2021-06-15 | Stop reason: HOSPADM

## 2021-06-14 RX ORDER — ESCITALOPRAM OXALATE 10 MG/1
10 TABLET ORAL DAILY
Status: DISCONTINUED | OUTPATIENT
Start: 2021-06-14 | End: 2021-06-15 | Stop reason: HOSPADM

## 2021-06-14 RX ORDER — PROPOFOL 10 MG/ML
INJECTION, EMULSION INTRAVENOUS AS NEEDED
Status: DISCONTINUED | OUTPATIENT
Start: 2021-06-14 | End: 2021-06-14

## 2021-06-14 RX ORDER — FENTANYL CITRATE 50 UG/ML
75 INJECTION, SOLUTION INTRAMUSCULAR; INTRAVENOUS ONCE
Status: COMPLETED | OUTPATIENT
Start: 2021-06-14 | End: 2021-06-14

## 2021-06-14 RX ORDER — ONDANSETRON 2 MG/ML
4 INJECTION INTRAMUSCULAR; INTRAVENOUS EVERY 6 HOURS PRN
Status: DISCONTINUED | OUTPATIENT
Start: 2021-06-14 | End: 2021-06-15 | Stop reason: HOSPADM

## 2021-06-14 RX ORDER — DOCUSATE SODIUM 100 MG/1
100 CAPSULE, LIQUID FILLED ORAL 2 TIMES DAILY PRN
Status: DISCONTINUED | OUTPATIENT
Start: 2021-06-14 | End: 2021-06-15 | Stop reason: HOSPADM

## 2021-06-14 RX ORDER — LIDOCAINE HYDROCHLORIDE 10 MG/ML
INJECTION, SOLUTION EPIDURAL; INFILTRATION; INTRACAUDAL; PERINEURAL AS NEEDED
Status: DISCONTINUED | OUTPATIENT
Start: 2021-06-14 | End: 2021-06-14

## 2021-06-14 RX ORDER — ATORVASTATIN CALCIUM 40 MG/1
40 TABLET, FILM COATED ORAL
Status: DISCONTINUED | OUTPATIENT
Start: 2021-06-14 | End: 2021-06-15 | Stop reason: HOSPADM

## 2021-06-14 RX ORDER — OXYCODONE HYDROCHLORIDE 5 MG/1
2.5 TABLET ORAL EVERY 4 HOURS PRN
Status: DISCONTINUED | OUTPATIENT
Start: 2021-06-14 | End: 2021-06-15 | Stop reason: HOSPADM

## 2021-06-14 RX ORDER — OXYCODONE HYDROCHLORIDE 5 MG/1
5 TABLET ORAL EVERY 4 HOURS PRN
Status: DISCONTINUED | OUTPATIENT
Start: 2021-06-14 | End: 2021-06-15 | Stop reason: HOSPADM

## 2021-06-14 RX ORDER — ACETAMINOPHEN 325 MG/1
650 TABLET ORAL EVERY 6 HOURS PRN
Status: DISCONTINUED | OUTPATIENT
Start: 2021-06-14 | End: 2021-06-15 | Stop reason: HOSPADM

## 2021-06-14 RX ORDER — HYDROMORPHONE HCL IN WATER/PF 6 MG/30 ML
0.2 PATIENT CONTROLLED ANALGESIA SYRINGE INTRAVENOUS ONCE
Status: COMPLETED | OUTPATIENT
Start: 2021-06-14 | End: 2021-06-14

## 2021-06-14 RX ORDER — ONDANSETRON 2 MG/ML
4 INJECTION INTRAMUSCULAR; INTRAVENOUS ONCE
Status: COMPLETED | OUTPATIENT
Start: 2021-06-14 | End: 2021-06-14

## 2021-06-14 RX ORDER — FENTANYL CITRATE/PF 50 MCG/ML
12.5 SYRINGE (ML) INJECTION
Status: DISCONTINUED | OUTPATIENT
Start: 2021-06-14 | End: 2021-06-14 | Stop reason: HOSPADM

## 2021-06-14 RX ORDER — LIDOCAINE 50 MG/G
1 PATCH TOPICAL DAILY PRN
Status: DISCONTINUED | OUTPATIENT
Start: 2021-06-14 | End: 2021-06-15 | Stop reason: HOSPADM

## 2021-06-14 RX ORDER — AMLODIPINE BESYLATE 5 MG/1
5 TABLET ORAL DAILY
Status: DISCONTINUED | OUTPATIENT
Start: 2021-06-14 | End: 2021-06-15 | Stop reason: HOSPADM

## 2021-06-14 RX ORDER — MAGNESIUM HYDROXIDE/ALUMINUM HYDROXICE/SIMETHICONE 120; 1200; 1200 MG/30ML; MG/30ML; MG/30ML
30 SUSPENSION ORAL EVERY 6 HOURS PRN
Status: DISCONTINUED | OUTPATIENT
Start: 2021-06-14 | End: 2021-06-15 | Stop reason: HOSPADM

## 2021-06-14 RX ORDER — VENLAFAXINE 37.5 MG/1
75 TABLET ORAL DAILY
Status: DISCONTINUED | OUTPATIENT
Start: 2021-06-14 | End: 2021-06-14

## 2021-06-14 RX ORDER — ONDANSETRON 2 MG/ML
4 INJECTION INTRAMUSCULAR; INTRAVENOUS ONCE AS NEEDED
Status: DISCONTINUED | OUTPATIENT
Start: 2021-06-14 | End: 2021-06-14 | Stop reason: HOSPADM

## 2021-06-14 RX ORDER — FENTANYL CITRATE 50 UG/ML
INJECTION, SOLUTION INTRAMUSCULAR; INTRAVENOUS AS NEEDED
Status: DISCONTINUED | OUTPATIENT
Start: 2021-06-14 | End: 2021-06-14

## 2021-06-14 RX ORDER — HEPARIN SODIUM 5000 [USP'U]/ML
5000 INJECTION, SOLUTION INTRAVENOUS; SUBCUTANEOUS EVERY 8 HOURS SCHEDULED
Status: DISCONTINUED | OUTPATIENT
Start: 2021-06-14 | End: 2021-06-15 | Stop reason: HOSPADM

## 2021-06-14 RX ORDER — CEFAZOLIN SODIUM 2 G/50ML
SOLUTION INTRAVENOUS AS NEEDED
Status: DISCONTINUED | OUTPATIENT
Start: 2021-06-14 | End: 2021-06-14

## 2021-06-14 RX ORDER — SODIUM CHLORIDE, SODIUM GLUCONATE, SODIUM ACETATE, POTASSIUM CHLORIDE, MAGNESIUM CHLORIDE, SODIUM PHOSPHATE, DIBASIC, AND POTASSIUM PHOSPHATE .53; .5; .37; .037; .03; .012; .00082 G/100ML; G/100ML; G/100ML; G/100ML; G/100ML; G/100ML; G/100ML
100 INJECTION, SOLUTION INTRAVENOUS CONTINUOUS
Status: DISCONTINUED | OUTPATIENT
Start: 2021-06-14 | End: 2021-06-15 | Stop reason: HOSPADM

## 2021-06-14 RX ORDER — TAMSULOSIN HYDROCHLORIDE 0.4 MG/1
0.4 CAPSULE ORAL
Status: DISCONTINUED | OUTPATIENT
Start: 2021-06-14 | End: 2021-06-15 | Stop reason: HOSPADM

## 2021-06-14 RX ORDER — PROPOFOL 10 MG/ML
INJECTION, EMULSION INTRAVENOUS CONTINUOUS PRN
Status: DISCONTINUED | OUTPATIENT
Start: 2021-06-14 | End: 2021-06-14

## 2021-06-14 RX ORDER — CEFAZOLIN SODIUM 2 G/50ML
2000 SOLUTION INTRAVENOUS ONCE
Status: DISCONTINUED | OUTPATIENT
Start: 2021-06-14 | End: 2021-06-14 | Stop reason: HOSPADM

## 2021-06-14 RX ORDER — HYDROMORPHONE HCL/PF 1 MG/ML
0.5 SYRINGE (ML) INJECTION ONCE
Status: COMPLETED | OUTPATIENT
Start: 2021-06-14 | End: 2021-06-14

## 2021-06-14 RX ORDER — LEVOTHYROXINE SODIUM 0.05 MG/1
50 TABLET ORAL
Status: DISCONTINUED | OUTPATIENT
Start: 2021-06-14 | End: 2021-06-15 | Stop reason: HOSPADM

## 2021-06-14 RX ORDER — EZETIMIBE 10 MG/1
10 TABLET ORAL DAILY
Status: DISCONTINUED | OUTPATIENT
Start: 2021-06-14 | End: 2021-06-15 | Stop reason: HOSPADM

## 2021-06-14 RX ADMIN — PHENYLEPHRINE HYDROCHLORIDE 200 MCG: 10 INJECTION INTRAVENOUS at 12:42

## 2021-06-14 RX ADMIN — Medication 12.5 MCG: at 13:33

## 2021-06-14 RX ADMIN — MECLIZINE HYDROCHLORIDE 31.25 MG: 25 TABLET ORAL at 08:55

## 2021-06-14 RX ADMIN — SODIUM CHLORIDE, SODIUM GLUCONATE, SODIUM ACETATE, POTASSIUM CHLORIDE, MAGNESIUM CHLORIDE, SODIUM PHOSPHATE, DIBASIC, AND POTASSIUM PHOSPHATE 100 ML/HR: .53; .5; .37; .037; .03; .012; .00082 INJECTION, SOLUTION INTRAVENOUS at 08:58

## 2021-06-14 RX ADMIN — HYDROMORPHONE HYDROCHLORIDE 0.2 MG: 0.2 INJECTION, SOLUTION INTRAMUSCULAR; INTRAVENOUS; SUBCUTANEOUS at 08:31

## 2021-06-14 RX ADMIN — CARVEDILOL 12.5 MG: 12.5 TABLET, FILM COATED ORAL at 15:54

## 2021-06-14 RX ADMIN — LEVOTHYROXINE SODIUM 50 MCG: 50 TABLET ORAL at 18:14

## 2021-06-14 RX ADMIN — HEPARIN SODIUM 5000 UNITS: 5000 INJECTION INTRAVENOUS; SUBCUTANEOUS at 21:12

## 2021-06-14 RX ADMIN — MECLIZINE HYDROCHLORIDE 31.25 MG: 25 TABLET ORAL at 18:14

## 2021-06-14 RX ADMIN — SODIUM CHLORIDE, SODIUM LACTATE, POTASSIUM CHLORIDE, AND CALCIUM CHLORIDE 75 ML/HR: .6; .31; .03; .02 INJECTION, SOLUTION INTRAVENOUS at 14:20

## 2021-06-14 RX ADMIN — FENTANYL CITRATE 25 MCG: 50 INJECTION INTRAMUSCULAR; INTRAVENOUS at 12:18

## 2021-06-14 RX ADMIN — ONDANSETRON 4 MG: 2 INJECTION INTRAMUSCULAR; INTRAVENOUS at 12:46

## 2021-06-14 RX ADMIN — CEFAZOLIN SODIUM 2000 MG: 2 SOLUTION INTRAVENOUS at 12:25

## 2021-06-14 RX ADMIN — PROPOFOL 120 MCG/KG/MIN: 10 INJECTION, EMULSION INTRAVENOUS at 12:26

## 2021-06-14 RX ADMIN — Medication 12.5 MCG: at 13:27

## 2021-06-14 RX ADMIN — TAMSULOSIN HYDROCHLORIDE 0.4 MG: 0.4 CAPSULE ORAL at 15:54

## 2021-06-14 RX ADMIN — ATORVASTATIN CALCIUM 40 MG: 40 TABLET, FILM COATED ORAL at 15:53

## 2021-06-14 RX ADMIN — HEPARIN SODIUM 5000 UNITS: 5000 INJECTION INTRAVENOUS; SUBCUTANEOUS at 14:20

## 2021-06-14 RX ADMIN — Medication 12.5 MCG: at 13:30

## 2021-06-14 RX ADMIN — SODIUM CHLORIDE, SODIUM LACTATE, POTASSIUM CHLORIDE, AND CALCIUM CHLORIDE: .6; .31; .03; .02 INJECTION, SOLUTION INTRAVENOUS at 12:18

## 2021-06-14 RX ADMIN — LIDOCAINE HYDROCHLORIDE 50 MG: 10 INJECTION, SOLUTION EPIDURAL; INFILTRATION; INTRACAUDAL; PERINEURAL at 12:23

## 2021-06-14 RX ADMIN — ONDANSETRON 4 MG: 2 INJECTION INTRAMUSCULAR; INTRAVENOUS at 02:50

## 2021-06-14 RX ADMIN — NOREPINEPHRINE BITARTRATE 1 MCG/MIN: 1 INJECTION, SOLUTION, CONCENTRATE INTRAVENOUS at 12:26

## 2021-06-14 RX ADMIN — PHENYLEPHRINE HYDROCHLORIDE 100 MCG: 10 INJECTION INTRAVENOUS at 12:32

## 2021-06-14 RX ADMIN — FENTANYL CITRATE 75 MCG: 50 INJECTION INTRAMUSCULAR; INTRAVENOUS at 02:51

## 2021-06-14 RX ADMIN — HYDROMORPHONE HYDROCHLORIDE 0.5 MG: 1 INJECTION, SOLUTION INTRAMUSCULAR; INTRAVENOUS; SUBCUTANEOUS at 03:39

## 2021-06-14 RX ADMIN — SODIUM CHLORIDE, SODIUM GLUCONATE, SODIUM ACETATE, POTASSIUM CHLORIDE, MAGNESIUM CHLORIDE, SODIUM PHOSPHATE, DIBASIC, AND POTASSIUM PHOSPHATE 100 ML/HR: .53; .5; .37; .037; .03; .012; .00082 INJECTION, SOLUTION INTRAVENOUS at 18:21

## 2021-06-14 RX ADMIN — SODIUM CHLORIDE, SODIUM GLUCONATE, SODIUM ACETATE, POTASSIUM CHLORIDE, MAGNESIUM CHLORIDE, SODIUM PHOSPHATE, DIBASIC, AND POTASSIUM PHOSPHATE 1000 ML: .53; .5; .37; .037; .03; .012; .00082 INJECTION, SOLUTION INTRAVENOUS at 03:26

## 2021-06-14 RX ADMIN — Medication 12.5 MCG: at 13:20

## 2021-06-14 RX ADMIN — PROPOFOL 150 MG: 10 INJECTION, EMULSION INTRAVENOUS at 12:23

## 2021-06-14 RX ADMIN — PHENYLEPHRINE HYDROCHLORIDE 100 MCG: 10 INJECTION INTRAVENOUS at 12:29

## 2021-06-14 RX ADMIN — HYDROMORPHONE HYDROCHLORIDE 0.2 MG: 0.2 INJECTION, SOLUTION INTRAMUSCULAR; INTRAVENOUS; SUBCUTANEOUS at 05:51

## 2021-06-14 RX ADMIN — PANTOPRAZOLE SODIUM 40 MG: 40 TABLET, DELAYED RELEASE ORAL at 08:55

## 2021-06-14 RX ADMIN — PHENYLEPHRINE HYDROCHLORIDE 200 MCG: 10 INJECTION INTRAVENOUS at 12:35

## 2021-06-14 NOTE — ASSESSMENT & PLAN NOTE
Currently still with back and left-sided pain  NPO at present  Referred to urology for stone extraction  Continue intravenous fluids to hydrate patient  Pain control

## 2021-06-14 NOTE — CONSULTS
[address] NOTE   Admission Date: 6/14/2021    Patient Identifiers: Torey Munoz (MRN: [id number])  Service Requesting Consultation: Marta Corrales MD  Service Providing Consultation:  Urology, Arnie Cristobal PA-C  Consults  Date of Service: 6/14/2021  Reason for Consultation:  Left ureteral calculi with hydronephrosis    History of Present Illness:     Torey Munoz is a [de-identified] y o  male known to Dr Penny Curran history of prostate cancer treated with radiation in 2012  He also has a history kidney stones  His last cysto and stent placements were in 2017 and 2000  He presented emergency room with several days left flank pain  CT abdomen pelvis showed a 7 mm stone in the proximal left ureter with hydronephrosis  He is afebrile  Creatinine 1 87  WBC 9 99  He has nausea and gagging  He denies any gross hematuria      Past Medical, Past Surgical History:     Past Medical History:   Diagnosis Date    Anesthesia     "once in awhile takes awhile to wake up:    Arthritis     Balance problems     Cancer ([address] )     Cardiac disease     Coronary artery disease     CPAP (continuous positive airway pressure) dependence     Dry mouth     Full dentures     Full dentures     History of coronary artery stent placement     x2, "approx 20 yrs ago or more"    History of radiation therapy     "20 treatments for prostate" "approx 10yrs ago"    [place] (hard of hearing)     Hyperlipidemia     Hypertension     Kidney stone     Neck pain     Phimosis     circumcison today 7/12/2019    Prostate CA ([address] )     Risk for falls     Shortness of breath     occas    Sleep apnea     Swelling of ankle     occas both ankles    Urinary frequency     Urinary urgency     Vertigo     Wears glasses     Wears hearing aid in both ears    :    Past Surgical History:   Procedure Laterality Date    CAROTID ENDARTERECTOMY Left     CHOLECYSTECTOMY      COLONOSCOPY  CORONARY STENT PLACEMENT      x2    CYSTOSCOPY      CYSTOSCOPY W/ URETERAL STENT REMOVAL      DENTAL SURGERY      HEMORRHOID SURGERY      NV CIRCUMCISION,OTHR N/A 7/12/2019    Procedure: CIRCUMCISION ADULT;  Surgeon: Devi Knox MD;  [facility];  Service: Urology    NV CYSTO/URETERO W/LITHOTRIPSY &INDWELL STENT INSRT Right 12/25/2017    Procedure: CYSTOSCOPY URETEROSCOPY WITH LITHOTRIPSY HOLMIUM LASER, RETROGRADE PYELOGRAM AND INSERTION STENT URETERAL;  Surgeon: Livan Keys MD;  [facility];  Service: Urology    NV CYSTO/URETERO W/LITHOTRIPSY &INDWELL STENT INSRT Right 1/19/2018    Procedure: CYSTOSCOPY URETEROSCOPIC STONE EXTRACTION  WITH LITHOTRIPSY HOLMIUM LASER, RETROGRADE PYELOGRAM AND INSERTION STENT URETERAL;  Surgeon: Devi Knox MD;  [facility];  Service: Urology    NV FRAGMENT KIDNEY STONE/ ESWL Right 4/26/2018    Procedure: RIGHT LITHROTRIPSY EXTRACORPORAL SHOCKWAVE (ESWL);   Surgeon: Devi Knox MD;  [facility];  Service: Urology    ROTATOR CUFF REPAIR Right    :    Medications, Allergies:     Current Facility-Administered Medications:     acetaminophen (TYLENOL) tablet 650 mg, 650 mg, Oral, Q6H PRN, Minus MD Cleve    aluminum-magnesium hydroxide-simethicone (MYLANTA) oral suspension 30 mL, 30 mL, Oral, Q6H PRN, Minus MD Cleve    amLODIPine (NORVASC) tablet 5 mg, 5 mg, Oral, Daily, Minus MD Cleve    atorvastatin (LIPITOR) tablet 40 mg, 40 mg, Oral, Daily With Dinner, Minus MD Cleve    carvedilol (COREG) tablet 12 5 mg, 12 5 mg, Oral, BID With Meals, Minus MD Cleve    docusate sodium (COLACE) capsule 100 mg, 100 mg, Oral, BID PRN, Minus MD Cleve    escitalopram (LEXAPRO) tablet 10 mg, 10 mg, Oral, Daily, Minus MD Cleve    ezetimibe (ZETIA) tablet 10 mg, 10 mg, Oral, Daily, Minus MD Cleve    heparin (porcine) subcutaneous injection 5,000 Units, 5,000 Units, Subcutaneous, Q8H [address] **AND** Platelet count, , , Once, Edie Steiner MD    HYDROmorphone HCl (DILAUDID) injection 0 2 mg, 0 2 mg, Intravenous, Q4H PRN, Edie Steiner MD, 0 2 mg at 06/14/21 0831    levothyroxine tablet 50 mcg, 50 mcg, Oral, After Dinner, Edie Steiner MD    lidocaine (LIDODERM) 5 % patch 1 patch, 1 patch, Topical, Daily PRN, Edie Steiner MD    meclizine (ANTIVERT) tablet 31 25 mg, 31 25 mg, Oral, BID, Edie Steiner MD    multi-electrolyte (PLASMALYTE-A/ISOLYTE-S PH 7 4) IV solution, 100 mL/hr, Intravenous, Continuous, Edie Steiner MD    nicotine (NICODERM CQ) 14 mg/24hr TD 24 hr patch 1 patch, 1 patch, Transdermal, Daily, Edie Steiner MD    ondansetron [facility]) injection 4 mg, 4 mg, Intravenous, Q6H PRN, Edie Steiner MD    oxyCODONE (ROXICODONE) IR tablet 2 5 mg, 2 5 mg, Oral, Q4H PRN, Edie Steiner MD    oxyCODONE (ROXICODONE) IR tablet 5 mg, 5 mg, Oral, Q4H PRN, Edie Steiner MD    pantoprazole (PROTONIX) EC tablet 40 mg, 40 mg, Oral, Early Morning, Edie Steiner MD    tamsulosin [facility]) capsule 0 4 mg, 0 4 mg, Oral, Daily With Lisa Dooley MD    [facility]) tablet 75 mg, 75 mg, Oral, Daily, Edie Steiner MD    Current Outpatient Medications:     acetaminophen (TYLENOL) 325 mg tablet, Take 650 mg by mouth every 4 (four) hours as needed  , Disp: , Rfl:     amLODIPine (NORVASC) 2 5 mg tablet, Take 5 mg by mouth daily , Disp: , Rfl:     aspirin (ADULT ASPIRIN EC LOW STRENGTH) 81 mg EC tablet, Take 325 mg by mouth  , Disp: , Rfl:     b complex vitamins capsule, Take 1 capsule by mouth daily, Disp: , Rfl:     CALCIUM CARBONATE-VITAMIN D PO, Take 1 tablet by mouth daily 600 mg, Disp: , Rfl:     carvedilol (COREG) 6 25 mg tablet, Take 12 5 mg by mouth 2 (two) times a day with meals, Disp: , Rfl:     clotrimazole (LOTRIMIN) 1 % cream, Apply to groin area 2 times daily, Disp: 12 g, Rfl: 0    escitalopram (LEXAPRO) 10 mg tablet, Take 10 mg by mouth daily, Disp: , Rfl:     ezetimibe (ZETIA) 10 mg tablet, Take 10 mg by mouth daily, Disp: , Rfl:     furosemide (LASIX) 20 mg tablet, Take 20 mg by mouth 2 (two) times a day, Disp: , Rfl:     ibuprofen (MOTRIN) 200 mg tablet, Take by mouth as needed  , Disp: , Rfl:     levothyroxine 50 mcg tablet, Take 50 mcg by mouth daily after dinner , Disp: , Rfl:     lidocaine (LIDODERM) 5 %, Place 1 patch on the skin daily as needed Remove & Discard patch within 12 hours or as directed by MD , Disp: , Rfl:     meclizine (ANTIVERT) 25 mg tablet, Take 32 mg by mouth 2 (two) times a day Spouse states patient has been taking this medication for last 2 weeks , Disp: , Rfl:     meloxicam (MOBIC) 15 mg tablet, Take 15 mg by mouth daily, Disp: , Rfl:     metFORMIN (GLUCOPHAGE) 1000 MG tablet, , Disp: , Rfl:     metFORMIN (GLUCOPHAGE) 500 mg tablet, Take 1,000 mg by mouth 2 (two) times a day with meals , Disp: , Rfl:     mupirocin (BACTROBAN) 2 % ointment, Apply topically 2 (two) times a day for 5 days Apply to tip of penis, Disp: 15 g, Rfl: 0    omeprazole (PriLOSEC) 40 MG capsule, daily  , Disp: , Rfl:     rosuvastatin (CRESTOR) 20 MG tablet, Take 40 mg by mouth daily , Disp: , Rfl:     tamsulosin (FLOMAX) 0 4 mg, Take 1 capsule (0 4 mg total) by mouth daily with dinner, Disp: 14 capsule, Rfl: 0    venlafaxine (EFFEXOR) 75 mg tablet, Take 75 mg by mouth daily Pt takes along with 150mg cap once a day for total dose of 225mg once a day , Disp: , Rfl:     Allergies: Allergies   Allergen Reactions    Indomethacin Other (See Comments)     Pt unsure of reaction     Iodinated Diagnostic Agents     Lisinopril Other (See Comments)     COUGH,,pt cant remember    Niacin      Other reaction(s):  Other (See Comments)  red, flushed skin    Nystatin      Pt unsure    Rosuvastatin Other (See Comments)     MUSCLE ACHES,, pt unsure of reaction   :    Social and Family History:   Social History:   Social History     Tobacco Use    Smoking status: Current Some Day Smoker     Years: 57 00     Types: Cigars    Smokeless tobacco: Never Used    Tobacco comment: declined   Substance Use Topics    Alcohol use: Yes     Alcohol/week: 1 0 standard drinks     Types: 1 Cans of beer per week     Comment: occasionally    Drug use: No        Social History     Tobacco Use   Smoking Status Current Some Day Smoker    Years: 57 00    Types: Cigars   Smokeless Tobacco Never Used   Tobacco Comment    declined       Family History:  History reviewed  No pertinent family history :     Review of Systems:     General: Fever, chills, or night sweats: negative  Cardiac: Negative for chest pain  Pulmonary: Negative for shortness of breath  Gastrointestinal: Abdominal pain negative  Nausea, vomiting, or diarrhea positive,  Genitourinary: See HPI above  Patient does not have hematuria  All other systems queried were negative  Physical Exam:   General: Patient is pleasant and in NAD  Awake and alert  /92   Pulse 88   Temp 97 7 °F (36 5 °C) (Oral)   Resp 20   Ht 5' 7" (1 702 m)   Wt 90 7 kg (200 lb)   SpO2 91%   BMI 31 32 kg/m²   HEENT:  Conjunctiva are clear  Constitutional:  pleasant and cooperative     no apparent distress  Cardiac: Peripheral edema: negative  Pulmonary: Non-labored breathing  Abdomen: Soft, non-tender, non-distended  No surgical scars  No masses, tenderness, hernias noted  Genitourinary: Positive CVA tenderness, negative suprapubic tenderness  Extremities:  Moves all extremities  Neurological:CNII-XII intact  No numbness or tingling   Essentially non focal neurologic exam  Psychiatric:mood affect and behavior normal        Labs:     Lab Results   Component Value Date    HGB 12 3 06/14/2021    HCT 37 1 06/14/2021    WBC 9 99 06/14/2021     (L) 06/14/2021   ]    Lab Results   Component Value Date    K 5 5 (H) 06/14/2021     06/14/2021    CO2 26 06/14/2021    BUN 23 06/14/2021    CREATININE 1 87 (H) 06/14/2021    CALCIUM 9 6 06/14/2021   ]    Imaging:   I personally reviewed the images and report of the following studies, and reviewed them with the patient:  CT ABDOMEN AND PELVIS WITHOUT IV CONTRAST   IMPRESSION:     Left hydronephrosis secondary to a 7 mm stone at the proximal left ureter  Simple fluid layering along the left retroperitoneum may indicate forniceal rupture  Additional bilateral nonobstructing nephrolithiasis      ASSESSMENT:     1  7 mm proximal left ureter calculus  2  Bilateral nephrolithiasis  3  Acute kidney injury  4  History of prostate cancer     PLAN:   -I reviewed the options of management with the patient  -he is scheduled for laser lithotripsy and stent insertion later today  -he understands if the stone is not accessible if there is signs of infection stent would be placed in he would return as an outpatient in sometime for definitive stone treatment      Thank you for allowing me to participate in this patients care  Please do not hesitate to call with any additional questions    Katharina Westfall PA-C

## 2021-06-14 NOTE — PROGRESS NOTES
[address] [facility] 1940, [de-identified] y o  male MRN: [id number]  Unit/Bed#: ED 09 Encounter: [id number]  Primary Care Provider: Kike Will MD   Date and time admitted to hospital: 6/14/2021  2:40 AM    [facility] Internal Medicine Post Admit Check:     Date of visit: 06/14/21    The patient was admitted earlier to the [facility] Internal Medicine Service  Please see initial intake history and physical for detailed admission history  This is a supplemental update following a post admit checkup  Subjective: doing okay  Complaining of left-sided flank/abdominal pain  No nausea currently but was gagging earlier  Denies any dysuria, hematuria or frequency  Exam:   General:  Awake alert oriented no distress  Abdomen:  Obese, tenderness left flank  Extremities:  Edema of left lower extremity, chronic per patient  Cardiovascular:  Regular rate rhythm      Daughter Bessy Antoine was called with update  Assessment and Plan:   * Hydronephrosis of left kidney  Assessment & Plan  Presented with left-sided flank pain found to have a 7 mm stone at the proximal left ureter  · Urology following, appreciate input  Plan for OR later for laser lithotripsy/stent insertion  · Continue IV fluids, pain control  · Will require outpatient follow-up as daughter is concerned he is getting recurrent stones will need formal evaluation    Acquired hypothyroidism  Assessment & Plan  · Continue home Synthroid    Mixed hyperlipidemia  Assessment & Plan  · Continue home medications    CAD (coronary artery disease)  Assessment & Plan  · History of, details unclear, prior stenting, follows with [facility]    History of prostate cancer  Assessment & Plan  · Treated with radiation in 2012    Essential hypertension  Assessment & Plan  Pressure acceptable  · Continue amlodipine 2 5 mg daily  · Coreg 6 252 tablets twice daily with meals          [employer], [place]

## 2021-06-14 NOTE — ANESTHESIA POSTPROCEDURE EVALUATION
Post-Op Assessment Note    CV Status:  Stable  Pain Score: 0    Pain management: adequate     Mental Status:  Sleepy and arousable   Hydration Status:  Euvolemic   PONV Controlled:  Controlled   Airway Patency:  Patent   Two or more mitigation strategies used for obstructive sleep apnea   Post Op Vitals Reviewed: Yes      Staff: Anesthesiologist, CRNA         No complications documented      /81 (06/14/21 1303)    Temp 98 3 °F (36 8 °C) (06/14/21 1303)    Pulse 86 (06/14/21 1303)   Resp 20 (06/14/21 1303)    SpO2 99 % (06/14/21 1303)

## 2021-06-14 NOTE — ED PROVIDER NOTES
History  Chief Complaint   Patient presents with    Abdominal Pain     Pt reports n/v and no bm for 2 days  Suppository was unsuccessful  44-year-old male presents with abdominal pain  Patient states that he has not had a bowel movement in 2 days  He usually has a bowel movement every day  Patient states that yesterday afternoon he became nauseous and started vomiting  Patient has had 4 episodes of nonbloody emesis  He reports that abdominal pain all over his abdomen, worse on the left side  He reports no fever, chills, or diarrhea  No urinary symptoms  Patient tried a suppository at home, but it was not successful  No recent surgeries  He has not been around anyone sick  Prior to Admission Medications   Prescriptions Last Dose Informant Patient Reported? Taking?    CALCIUM CARBONATE-VITAMIN D PO   Yes No   Sig: Take 1 tablet by mouth daily 600 mg   acetaminophen (TYLENOL) 325 mg tablet  Self Yes No   Sig: Take 650 mg by mouth every 4 (four) hours as needed     amLODIPine (NORVASC) 2 5 mg tablet   Yes No   Sig: Take 5 mg by mouth daily    aspirin (ADULT ASPIRIN EC LOW STRENGTH) 81 mg EC tablet  Self Yes No   Sig: Take 325 mg by mouth     b complex vitamins capsule   Yes No   Sig: Take 1 capsule by mouth daily   carvedilol (COREG) 6 25 mg tablet   Yes No   Sig: Take 12 5 mg by mouth 2 (two) times a day with meals   clotrimazole (LOTRIMIN) 1 % cream   No No   Sig: Apply to groin area 2 times daily   escitalopram (LEXAPRO) 10 mg tablet   Yes No   Sig: Take 10 mg by mouth daily   ezetimibe (ZETIA) 10 mg tablet   Yes No   Sig: Take 10 mg by mouth daily   furosemide (LASIX) 20 mg tablet   Yes No   Sig: Take 20 mg by mouth 2 (two) times a day   ibuprofen (MOTRIN) 200 mg tablet   Yes No   Sig: Take by mouth as needed     levothyroxine 50 mcg tablet   Yes No   Sig: Take 50 mcg by mouth daily after dinner    lidocaine (LIDODERM) 5 %   Yes No   Sig: Place 1 patch on the skin daily as needed Remove & Discard patch within 12 hours or as directed by MD hooverne (ANTIVERT) 25 mg tablet   Yes No   Sig: Take 32 mg by mouth 2 (two) times a day Spouse states patient has been taking this medication for last 2 weeks    meloxicam (MOBIC) 15 mg tablet   Yes No   Sig: Take 15 mg by mouth daily   metFORMIN (GLUCOPHAGE) 1000 MG tablet   Yes No   metFORMIN (GLUCOPHAGE) 500 mg tablet   Yes No   Sig: Take 1,000 mg by mouth 2 (two) times a day with meals    mupirocin (BACTROBAN) 2 % ointment   No No   Sig: Apply topically 2 (two) times a day for 5 days Apply to tip of penis   omeprazole (PriLOSEC) 40 MG capsule  Self Yes No   Sig: daily       rosuvastatin (CRESTOR) 20 MG tablet   Yes No   Sig: Take 40 mg by mouth daily    tamsulosin (FLOMAX) 0 4 mg   No No   Sig: Take 1 capsule (0 4 mg total) by mouth daily with dinner   venlafaxine (EFFEXOR) 75 mg tablet   Yes No   Sig: Take 75 mg by mouth daily Pt takes along with 150mg cap once a day for total dose of 225mg once a day       Facility-Administered Medications: None       Past Medical History:   Diagnosis Date    Anesthesia     "once in awhile takes awhile to wake up:    Arthritis     Balance problems     Cancer ([facility] [address] )     Cardiac disease     Coronary artery disease     CPAP (continuous positive airway pressure) dependence     Dry mouth     Full dentures     Full dentures     History of coronary artery stent placement     x2, "approx 20 yrs ago or more"    History of radiation therapy     "20 treatments for prostate" "approx 10yrs ago"    [place] (hard of hearing)     Hyperlipidemia     Hypertension     Kidney stone     Neck pain     Phimosis     circumcison today 7/12/2019    Prostate CA (HCC)     Risk for falls     Shortness of breath     occas    Sleep apnea     Swelling of ankle     occas both ankles    Urinary frequency     Urinary urgency     Vertigo     Wears glasses     Wears hearing aid in both ears        Past Surgical History:   Procedure Laterality Date    CAROTID ENDARTERECTOMY Left     CHOLECYSTECTOMY      COLONOSCOPY      CORONARY STENT PLACEMENT      x2    CYSTOSCOPY      CYSTOSCOPY W/ URETERAL STENT REMOVAL      DENTAL SURGERY      HEMORRHOID SURGERY      LA CIRCUMCISION,OTHR N/A 7/12/2019    Procedure: CIRCUMCISION ADULT;  Surgeon: Vic Mac MD;  [facility];  Service: Urology    LA CYSTO/URETERO W/LITHOTRIPSY &INDWELL STENT INSRT Right 12/25/2017    Procedure: CYSTOSCOPY URETEROSCOPY WITH LITHOTRIPSY HOLMIUM LASER, RETROGRADE PYELOGRAM AND INSERTION STENT URETERAL;  Surgeon: Jyoti Reagan MD;  [facility];  Service: Urology    LA CYSTO/URETERO W/LITHOTRIPSY &INDWELL STENT INSRT Right 1/19/2018    Procedure: CYSTOSCOPY URETEROSCOPIC STONE EXTRACTION  WITH LITHOTRIPSY HOLMIUM LASER, RETROGRADE PYELOGRAM AND INSERTION STENT URETERAL;  Surgeon: Vic Mac MD;  [facility];  Service: Urology    LA FRAGMENT KIDNEY STONE/ ESWL Right 4/26/2018    Procedure: RIGHT LITHROTRIPSY EXTRACORPORAL SHOCKWAVE (ESWL); Surgeon: Vic Mac MD;  [facility];  Service: Urology    ROTATOR CUFF REPAIR Right        History reviewed  No pertinent family history  I have reviewed and agree with the history as documented  E-Cigarette/Vaping     E-Cigarette/Vaping Substances     Social History     Tobacco Use    Smoking status: Current Some Day Smoker     Years: 57 00     Types: Cigars    Smokeless tobacco: Never Used    Tobacco comment: declined   Substance Use Topics    Alcohol use: Yes     Alcohol/week: 1 0 standard drinks     Types: 1 Cans of beer per week     Comment: occasionally    Drug use: No        Review of Systems   Constitutional: Negative for appetite change, chills, fatigue and fever  HENT: Negative  Eyes: Negative  Respiratory: Negative for cough, chest tightness and shortness of breath  Cardiovascular: Negative for chest pain and palpitations  Gastrointestinal: Positive for abdominal pain, constipation, nausea and vomiting  Negative for diarrhea  Endocrine: Negative  Genitourinary: Negative for difficulty urinating, dysuria and hematuria  Musculoskeletal: Negative for arthralgias, back pain and myalgias  Skin: Negative for pallor and rash  Allergic/Immunologic: Negative  Neurological: Negative for dizziness, weakness, light-headedness and headaches  Hematological: Negative  Physical Exam  ED Triage Vitals   Temperature Pulse Respirations Blood Pressure SpO2   06/14/21 0244 06/14/21 0244 06/14/21 0244 06/14/21 0244 06/14/21 0244   97 7 °F (36 5 °C) 91 18 (!) 178/88 96 %      Temp Source Heart Rate Source Patient Position - Orthostatic VS BP Location FiO2 (%)   06/14/21 0244 -- -- -- --   Oral          Pain Score       06/14/21 0251       Worst Possible Pain             Orthostatic Vital Signs  Vitals:    06/14/21 0244 06/14/21 0457   BP: (!) 178/88 168/84   Pulse: 91 88       Physical Exam  Vitals and nursing note reviewed  Constitutional:       General: He is in acute distress  Appearance: Normal appearance  He is well-developed  He is not ill-appearing  HENT:      Head: Normocephalic and atraumatic  Eyes:      Conjunctiva/sclera: Conjunctivae normal    Cardiovascular:      Rate and Rhythm: Normal rate and regular rhythm  Pulmonary:      Effort: Pulmonary effort is normal  No respiratory distress  Abdominal:      General: Abdomen is flat  There is no distension  Palpations: Abdomen is soft  Tenderness: There is generalized abdominal tenderness  There is no right CVA tenderness, left CVA tenderness, guarding or rebound  Musculoskeletal:         General: Normal range of motion  Cervical back: Normal range of motion and neck supple  Skin:     General: Skin is warm and dry  Neurological:      General: No focal deficit present  Mental Status: He is alert and oriented to person, place, and time  ED Medications  Medications   HYDROmorphone HCl (DILAUDID) injection 0 2 mg (has no administration in time range)   ondansetron (ZOFRAN) injection 4 mg (4 mg Intravenous Given 6/14/21 0250)   fentanyl citrate (PF) 100 MCG/2ML 75 mcg (75 mcg Intravenous Given 6/14/21 0251)   multi-electrolyte (ISOLYTE-S PH 7 4) bolus 1,000 mL (0 mL Intravenous Stopped 6/14/21 0426)   HYDROmorphone (DILAUDID) injection 0 5 mg (0 5 mg Intravenous Given 6/14/21 0339)       Diagnostic Studies  Results Reviewed     Procedure Component Value Units Date/Time    Comprehensive metabolic panel [[id number]]  (Abnormal) Collected: 06/14/21 0300    Lab Status: Final result Specimen: Blood from Arm, Left Updated: 06/14/21 0353     Sodium 134 mmol/L      Potassium 5 5 mmol/L      Chloride 100 mmol/L      CO2 26 mmol/L      ANION GAP 8 mmol/L      BUN 23 mg/dL      Creatinine 1 87 mg/dL      Glucose 194 mg/dL      Calcium 9 6 mg/dL      AST 89 U/L      ALT 25 U/L      Alkaline Phosphatase 49 U/L      Total Protein 8 0 g/dL      Albumin 3 8 g/dL      Total Bilirubin 1 22 mg/dL      eGFR 33 ml/min/1 73sq m     Narrative:      [place] guidelines for Chronic Kidney Disease (CKD):     Stage 1 with normal or high GFR (GFR > 90 mL/min/1 73 square meters)    Stage 2 Mild CKD (GFR = 60-89 mL/min/1 73 square meters)    Stage 3A Moderate CKD (GFR = 45-59 mL/min/1 73 square meters)    Stage 3B Moderate CKD (GFR = 30-44 mL/min/1 73 square meters)    Stage 4 Severe CKD (GFR = 15-29 mL/min/1 73 square meters)    Stage 5 End Stage CKD (GFR <15 mL/min/1 73 square meters)  Note: GFR calculation is accurate only with a steady state creatinine    Lipase [[id number]]  (Abnormal) Collected: 06/14/21 0300    Lab Status: Final result Specimen: Blood from Arm, Left Updated: 06/14/21 0336     Lipase 14 u/L     CBC and differential [[id number]]  (Abnormal) Collected: 06/14/21 0326    Lab Status: Final result Specimen: Blood from Arm, Left Updated: 06/14/21 0333     WBC 9 99 Thousand/uL      RBC 3 82 Million/uL      Hemoglobin 12 3 g/dL      Hematocrit 37 1 %      MCV 97 fL      MCH 32 2 pg      MCHC 33 2 g/dL      RDW 13 9 %      MPV 9 9 fL      Platelets 743 Thousands/uL      nRBC 0 /100 WBCs      Neutrophils Relative 79 %      Immat GRANS % 0 %      Lymphocytes Relative 8 %      Monocytes Relative 12 %      Eosinophils Relative 1 %      Basophils Relative 0 %      Neutrophils Absolute 7 87 Thousands/µL      Immature Grans Absolute 0 03 Thousand/uL      Lymphocytes Absolute 0 80 Thousands/µL      Monocytes Absolute 1 17 Thousand/µL      Eosinophils Absolute 0 08 Thousand/µL      Basophils Absolute 0 04 Thousands/µL                  CT abdomen pelvis wo contrast   Final Result by Jodi Tran MD (06/14 0454)      Left hydronephrosis secondary to a 7 mm stone at the proximal left ureter  Simple fluid layering along the left retroperitoneum may indicate forniceal rupture  Additional bilateral nonobstructing nephrolithiasis  Workstation performed: [id number]               Procedures  Procedures      ED Course  ED Course as of Jun 14 0513   Mon Jun 14, 2021   0319 Pt feeling better after meds      0507 Urology says admit to medicine                  Identification of Seniors at Risk      Most Recent Value   (ISAR) Identification of Seniors at Risk   Before the illness or injury that brought you to the Emergency, did you need someone to help you on a regular basis? 0 Filed at: 06/14/2021 0244   In the last 24 hours, have you needed more help than usual?  1 Filed at: 06/14/2021 0244   Have you been hospitalized for one or more nights during the past 6 months? 0 Filed at: 06/14/2021 0244   In general, do you see well?  0 Filed at: 06/14/2021 0244   In general, do you have serious problems with your memory? 0 Filed at: 06/14/2021 0244   Do you take more than three different medications every day?   1 Filed at: 06/14/2021 [place] Score  2 Filed at: 06/14/2021 0244                    SBIRT 20yo+      Most Recent Value   SBIRT (23 yo +)   In order to provide better care to our patients, we are screening all of our patients for alcohol and drug use  Would it be okay to ask you these screening questions? Yes Filed at: 06/14/2021 0431   Initial Alcohol Screen: US AUDIT-C    1  How often do you have a drink containing alcohol?  0 Filed at: 06/14/2021 0431   2  How many drinks containing alcohol do you have on a typical day you are drinking? 0 Filed at: 06/14/2021 0431   3b  FEMALE Any Age, or MALE 65+: How often do you have 4 or more drinks on one occassion? 0 Filed at: 06/14/2021 0431   Audit-C Score  0 Filed at: 06/14/2021 0958   CHARLES: How many times in the past year have you    Used an illegal drug or used a prescription medication for non-medical reasons? Never Filed at: 06/14/2021 0431                MDM  Number of Diagnoses or Management Options  Hydronephrosis: new and requires workup  Left nephrolithiasis: new and requires workup  Diagnosis management comments: 70-year-old male presents with nausea, vomiting, and constipation  Will check labs with a lipase and a CT abdomen pelvis  Patient is very uncomfortable and we will give fentanyl and Zofran for pain and nausea  Amount and/or Complexity of Data Reviewed  Clinical lab tests: ordered and reviewed  Tests in the radiology section of CPT®: ordered and reviewed  Review and summarize past medical records: yes  Discuss the patient with other providers: yes    Risk of Complications, Morbidity, and/or Mortality  Presenting problems: moderate  Diagnostic procedures: moderate  Management options: moderate    Patient Progress  Patient progress: improved    Patient felt better after receiving medications here in the ED  Urology wants to keep him NPO for procedure later today  Patient was admitted to [facility]      Disposition  Final diagnoses:   Left nephrolithiasis   Hydronephrosis Time reflects when diagnosis was documented in both MDM as applicable and the Disposition within this note     Time User Action Codes Description Comment    6/14/2021  5:08 AM Srinivasan Nicholas S Add [N13 30] Hydronephrosis of left kidney     6/14/2021  5:13 AM CharanjitjerichoflakitoAyad Add [N20 0] Left nephrolithiasis     6/14/2021  5:13 AM Bethany Muirll Add [N13 30] Hydronephrosis       ED Disposition     ED Disposition Condition Date/Time Comment    Admit Good Mon Jun 14, 2021  5:13 AM Case was discussed with Dr Hampton Boeck and the patient's admission status was agreed to be Admission Status: inpatient status to the service of Dr Hampton Boeck   Follow-up Information    None         Patient's Medications   Discharge Prescriptions    No medications on file     No discharge procedures on file  PDMP Review     None           ED Provider  Attending physically available and evaluated Yeseniadarlene Arnaldo LARA managed the patient along with the ED Attending      Electronically Signed by         Sher Nieves DO  06/14/21 [address] [place] [address],   06/14/21 8132

## 2021-06-14 NOTE — ANESTHESIA PREPROCEDURE EVALUATION
Procedure:  CYSTOSCOPY RETROGRADE PYELOGRAM WITH INSERTION STENT URETERAL (Left Bladder)    Relevant Problems   ANESTHESIA (within normal limits)      CARDIO   (+) CAD (coronary artery disease)   (+) CABRERA (dyspnea on exertion) (chronic)   (+) Essential hypertension   (+) Mixed hyperlipidemia   (-) Chest pain      ENDO   (+) Acquired hypothyroidism      GI/HEPATIC   (-) Gastroesophageal reflux disease      /RENAL   (+) Hydronephrosis of left kidney      NEURO/PSYCH   (+) History of phimosis   (+) History of prostate cancer      PULMONARY   (+) CABRERA (dyspnea on exertion) (chronic)   (-) Respiratory failure (HCC)   (-) URI (upper respiratory infection)      TTE 2020:    Left Ventricle  Left ventricle is normal in size  There is mild   concentric hypertrophy  Systolic function is mildly decreased with an   ejection fraction of 45-50%  Global hypokinesia  There is grade I (mild)   diastolic dysfunction    Mitral Valve  The leaflets are mildly thickened  There is mild   posterior annular calcification  There is moderate regurgitation with a   centrally directed jet    Aorta  The ascending aorta is mildly dilated  4 0 cm    Physical Exam    Airway    Mallampati score: II  TM Distance: >3 FB  Neck ROM: full     Dental       Cardiovascular      Pulmonary      Other Findings        Anesthesia Plan  ASA Score- 3     Anesthesia Type- general with ASA Monitors  Additional Monitors:   Airway Plan: LMA  Plan Factors-Exercise tolerance (METS): <4 METS  Chart reviewed  EKG reviewed  Existing labs reviewed  Induction- intravenous  Postoperative Plan-     Informed Consent- Anesthetic plan and risks discussed with patient  I personally reviewed this patient with the CRNA  Discussed and agreed on the Anesthesia Plan with the CRNA  Karrie Nissen

## 2021-06-14 NOTE — TELEPHONE ENCOUNTER
Isabel Sandoval is an 51-year-old male status post stent for obstructing left ureteral calculus  Will require definitive ureteroscopy by Dr Elsy Cedeno   or Frederik Krabbe (known well to him) please contact patient with hospital follow-up appointment date and time

## 2021-06-14 NOTE — H&P
HISTORY AND PHYSICAL  ? ? Patient Name: Zehra Darby  Patient MRN: [id number]  Attending Provider: No att  providers found  Service: Urology  Chief Complaint    Left upper ureteral stone    HPI   Zehra Darby is a [de-identified] y o  male with with a left upper ureteral stone  He presented emergently on June 14th and a stent was placed at that time  I plan cystoscopy, left retrograde pyelography, left ureteroscopy with laser lithotripsy and left stent exchange  Potential risks and complications discussed, and informed consent was given by the patient  Medications  Meds/Allergies   No current facility-administered medications for this visit  Cannot display prior to admission medications because the patient has not been admitted in this contact  No current facility-administered medications for this visit  No current outpatient medications on file      Facility-Administered Medications Ordered in Other Visits:     acetaminophen (TYLENOL) tablet 650 mg, 650 mg, Oral, Q6H PRN, Dev Panda MD    aluminum-magnesium hydroxide-simethicone (MYLANTA) oral suspension 30 mL, 30 mL, Oral, Q6H PRN, Dev Panda MD    amLODIPine (NORVASC) tablet 5 mg, 5 mg, Oral, Daily, Dev Panda MD    atorvastatin (LIPITOR) tablet 40 mg, 40 mg, Oral, Daily With Dinner, Dev Panda MD    carvedilol (COREG) tablet 12 5 mg, 12 5 mg, Oral, BID With Meals, Dev Panda MD    ceFAZolin (ANCEF) IVPB (premix in dextrose) 2,000 mg 50 mL, 2,000 mg, Intravenous, Once, Candie Stallworth MD    docusate sodium (COLACE) capsule 100 mg, 100 mg, Oral, BID PRN, Dev Panda MD    escitalopram (LEXAPRO) tablet 10 mg, 10 mg, Oral, Daily, Dev Panda MD    ezetimibe (ZETIA) tablet 10 mg, 10 mg, Oral, Daily, Dev Panda MD    fentaNYL (SUBLIMAZE) injection 12 5 mcg, 12 5 mcg, Intravenous, Q3 min PRN, Janet Fitzgerald CRNA    heparin (porcine) subcutaneous injection 5,000 Units, 5,000 Units, Subcutaneous, Q8H [facility] **AND** [COMPLETED] Platelet count, , , Once, Caterina Rincon MD    HYDROmorphone HCl (DILAUDID) injection 0 2 mg, 0 2 mg, Intravenous, Q4H PRN, Caterina Rincon MD, 0 2 mg at 06/14/21 0831    lactated ringers infusion, 75 mL/hr, Intravenous, Continuous, Bennydarlene SilvermanStar, CRNA    levothyroxine tablet 50 mcg, 50 mcg, Oral, After Dinner, Caterina Rincon MD    lidocaine (LIDODERM) 5 % patch 1 patch, 1 patch, Topical, Daily PRN, Caterina Rincon MD    meclizine (ANTIVERT) tablet 31 25 mg, 31 25 mg, Oral, BID, Caterina Rincon MD, 31 25 mg at 06/14/21 0855    multi-electrolyte (PLASMALYTE-A/ISOLYTE-S PH 7 4) IV solution, 100 mL/hr, Intravenous, Continuous, Caterina Rincon MD, Last Rate: 100 mL/hr at 06/14/21 0858, 100 mL/hr at 06/14/21 0858    nicotine (NICODERM CQ) 14 mg/24hr TD 24 hr patch 1 patch, 1 patch, Transdermal, Daily, Caterina Rincon MD    ondansetron [facility]) injection 4 mg, 4 mg, Intravenous, Q6H PRN, Caterina Rincon MD, 4 mg at 06/14/21 1246    ondansetron (ZOFRAN) injection 4 mg, 4 mg, Intravenous, Once PRN, Isak Guamanine, CRNA    oxyCODONE (ROXICODONE) IR tablet 2 5 mg, 2 5 mg, Oral, Q4H PRN, Caterina Rincon MD    oxyCODONE (ROXICODONE) IR tablet 5 mg, 5 mg, Oral, Q4H PRN, Caterina Rincon MD    pantoprazole (PROTONIX) EC tablet 40 mg, 40 mg, Oral, Early Morning, Caterina Rincon MD, 40 mg at 06/14/21 0855    tamsulosin (FLOMAX) capsule 0 4 mg, 0 4 mg, Oral, Daily With Lana Adams MD  Review of Systems  10 point review of systems negative except as noted in HPI  Allergies  Allergies   Allergen Reactions    Indomethacin Other (See Comments)     Pt unsure of reaction     Iodinated Diagnostic Agents     Lisinopril Other (See Comments)     COUGH,,pt cant remember    Niacin      Other reaction(s):  Other (See Comments)  red, flushed skin    Nystatin      Pt unsure    Rosuvastatin Other (See Comments)     MUSCLE ACHES,, pt unsure of reaction PMH  Past Medical History:   Diagnosis Date    Anesthesia     "once in awhile takes awhile to wake up:    Arthritis     Balance problems     [facility])     Cardiac disease     Coronary artery disease     CPAP (continuous positive airway pressure) dependence     Dry mouth     Full dentures     Full dentures     History of coronary artery stent placement     x2, "approx 20 yrs ago or more"    History of radiation therapy     "20 treatments for prostate" "approx 10yrs ago"    [employer] [place] (hard of hearing)     Hyperlipidemia     Hypertension     Kidney stone     Neck pain     Phimosis     circumcison today 7/12/2019    Prostate CA ([address] )     Risk for falls     Shortness of breath     occas    Sleep apnea     Swelling of ankle     occas both ankles    Urinary frequency     Urinary urgency     Vertigo     Wears glasses     Wears hearing aid in both ears      Past surgical history  Past Surgical History:   Procedure Laterality Date    CAROTID ENDARTERECTOMY Left     CHOLECYSTECTOMY      COLONOSCOPY      CORONARY STENT PLACEMENT      x2    CYSTOSCOPY      CYSTOSCOPY W/ URETERAL STENT REMOVAL      DENTAL SURGERY      HEMORRHOID SURGERY      MD CIRCUMCISION,OTHR N/A 7/12/2019    Procedure: CIRCUMCISION ADULT;  Surgeon: Radhika Esquivel MD;  [facility];  Service: Urology    MD CYSTO/URETERO W/LITHOTRIPSY &INDWELL STENT INSRT Right 12/25/2017    Procedure: CYSTOSCOPY URETEROSCOPY WITH LITHOTRIPSY HOLMIUM LASER, RETROGRADE PYELOGRAM AND INSERTION STENT URETERAL;  Surgeon: Sophy Munoz MD;  [facility];  Service: Urology    MD CYSTO/URETERO W/LITHOTRIPSY &INDWELL STENT INSRT Right 1/19/2018    Procedure: CYSTOSCOPY URETEROSCOPIC STONE EXTRACTION  WITH LITHOTRIPSY HOLMIUM LASER, RETROGRADE PYELOGRAM AND INSERTION STENT URETERAL;  Surgeon: Radhika Esquivel MD;  [facility];  Service: Urology    MD FRAGMENT KIDNEY STONE/ ESWL Right 4/26/2018    Procedure: RIGHT LITHROTRIPSY EXTRACORPORAL SHOCKWAVE (ESWL); Surgeon: Hayden Paniagua MD;  [facility];  Service: Urology    ROTATOR CUFF REPAIR Right      Social history  Social History     Tobacco Use    Smoking status: Current Some Day Smoker     Years: 57 00     Types: Cigars    Smokeless tobacco: Never Used    Tobacco comment: declined   Substance Use Topics    Alcohol use: Yes     Alcohol/week: 1 0 standard drinks     Types: 1 Cans of beer per week     Comment: occasionally    Drug use: No     ?  Physical Exam      There were no vitals taken for this visit  General appearance: alert and oriented, in no acute distress  Head: Normocephalic, without obvious abnormality, atraumatic  Neck: supple, symmetrical, trachea midline  Back: symmetric, no curvature  ROM normal  No CVA tenderness    Lungs: clear to auscultation bilaterally  Heart: regular rate and rhythm  Abdomen: soft, non-tender; bowel sounds normal; no masses,  no organomegaly  Male genitalia: normal  Extremities: extremities normal, warm and well-perfused; no cyanosis, clubbing, or edema  Neurologic: Grossly normal     Jessika Garcia MD

## 2021-06-14 NOTE — ASSESSMENT & PLAN NOTE
Presented with left-sided flank pain found to have a 7 mm stone at the proximal left ureter  · Urology following, appreciate input    Plan for OR later for laser lithotripsy/stent insertion  · Continue IV fluids, pain control  · Will require outpatient follow-up as daughter is concerned he is getting recurrent stones will need formal evaluation

## 2021-06-14 NOTE — ASSESSMENT & PLAN NOTE
Pressure acceptable  · Continue amlodipine 2 5 mg daily  · Coreg 6 252 tablets twice daily with meals

## 2021-06-14 NOTE — OP NOTE
OPERATIVE REPORT  PATIENT NAME: Mik Hansen    :  1940  MRN: [id number]  Pt Location: [facility] 01    SURGERY DATE: 2021    Surgeon(s) and Role:     * Marguerite Weiss MD - Primary    Preop Diagnosis:  Hydronephrosis of left kidney [N13 30]  Left nephrolithiasis [N20 0]    Post-Op Diagnosis Codes: * Hydronephrosis of left kidney [N13 30]     * Left nephrolithiasis [N20 0]    Procedure(s) (LRB):  CYSTOSCOPY RETROGRADE PYELOGRAM WITH INSERTION STENT URETERAL (Left)    Specimen(s):  ID Type Source Tests Collected by Time Destination   A :  Urine Urine, Cystoscopic URINE CULTURE Marguerite Weiss MD 2021 1245        Estimated Blood Loss:   Minimal    Drains:  Ureteral Drain/Stent Right ureter 22 Fr  (Active)   Number of days: 1267       Ureteral Drain/Stent Right ureter 6 Fr  (Active)   Number of days: 6254       Anesthesia Type:   Choice    Operative Indications:  Hydronephrosis of left kidney [N13 30]  Left nephrolithiasis [N20 0]  Forniceal extravasation  Acute kidney injury    Operative Findings:  Urethra with pale bulbar mucosa and open stricture  The prostatic fossa has been resected and there is a open bladder neck contracture  The bladder is large capacity mildly trabeculated  Ureteral orifices are normal   There are no stones, tumors or diverticula noted in the bladder  Preliminary fluoroscopic images did not reveal a definite calcification  With retrograde pyelography a short stricture was noted in the upper ureter  The stone was seen to move back into the renal pelvis with the retrograde  A 6 Naur[place] stent was successfully placed  Complications:   None    Procedure and Technique:  The patient is brought to the operating properly identified  General anesthesia was administered  He was placed in lithotomy position prepped draped in usual sterile fashion  Compression boots were employed  Intravenous antibiotic was administered    An appropriate time-out was performed  Cystourethroscopy was performed with 25 [place]i cystoscope with findings as above  A left retrograde pyelogram was performed with dilute Omnipaque and an open-ended ureteral catheter  Again, findings are as above  Next, a 0 035 Bentson wire is placed up the left collecting system and into the renal pelvis under fluoroscopic guidance  A 6 Kuwaiti contour stent was then passed over the guidewire pushed into position with the pusher  The guidewire was withdrawn and a nice coil noted in the renal pelvis  The stent was detached from the pusher leaving a nice coil in the bladder  The bladder was drained and cystoscope removed  The patient was awakened from anesthesia  He tolerated procedure well  He was taken to the recovery room in satisfactory condition     I was present for the entire procedure    Patient Disposition:  PACU  and patient will return to OR for planned surgery as a staged procedure    SIGNATURE: Beni Palomino MD  DATE: June 14, 2021  TIME: 12:50 PM

## 2021-06-14 NOTE — TREATMENT PLAN
Kalia Olson is an 70-year-old male known to our service presenting with left flank pain CT confirmed left obstructing nephrolithiasis  He is postoperative day 0 cystoscopy, retrograde pyelogram and left ureteral stent placement  Intraoperative findings as documented below by the surgeon of record, Dr Leny Salvador MD    Operative Findings:  Urethra with pale bulbar mucosa and open stricture  The prostatic fossa has been resected and there is a open bladder neck contracture  The bladder is large capacity mildly trabeculated  Ureteral orifices are normal   There are no stones, tumors or diverticula noted in the bladder  Preliminary fluoroscopic images did not reveal a definite calcification  With retrograde pyelography a short stricture was noted in the upper ureter  The stone was seen to move back into the renal pelvis with the retrograde  A 6 Nepalese stent was successfully placed  Plan:  · He is released from the PACU to the care of primary Internal Medicine service for medical optimization  · He can be discharged at the discretion of the Internal Medicine service  · Have written an additional 3 days of Keflex  · Our service will contact patient with scheduling in preoperative instructions for definitive ureteroscopy/stone treatment on an outpatient elective basis  · No further  intervention indicated this hospital stay

## 2021-06-14 NOTE — H&P
[address]  1940, [de-identified] y o  male MRN: [id number]  Unit/Bed#: ED 09 Encounter: [id number]  Primary Care Provider: Candace Kwok MD   Date and time admitted to hospital: 6/14/2021  2:40 AM    * Hydronephrosis of left kidney  Assessment & Plan  Currently still with back and left-sided pain  NPO at present  Referred to urology for stone extraction  Continue intravenous fluids to hydrate patient  Pain control  Acquired hypothyroidism  Assessment & Plan  Coreg 12 5 mg twice daily to continue  Levothyroxine 50 mcg daily  Mixed hyperlipidemia  Assessment & Plan  Continue Zetia 10 mg daily  Crestor 20 mg daily  Essential hypertension  Assessment & Plan  Continue amlodipine 2 5 mg daily  Coreg 6 252 tablets twice daily with meals  VTE Prophylaxis: Enoxaparin (Lovenox)  / sequential compression device   Code Status: Prior full Code  POLST: There is no POLST form on file for this patient (pre-hospital)    Anticipated Length of Stay:  Patient will be admitted on an Observation basis with an anticipated length of stay of  less that 2 midnights  Justification for Hospital Stay: Please see detailed plans noted above  Chief Complaint:     Left-sided back pain and left-sided abdominal discomfort  History of Present Illness:  Alexandre Velazquez is a [de-identified] y o  male who has past medical history significant for coronary artery disease with hypertension, hyperlipidemia, history of prostate cancer, urolithiasis who presents with 1 day history of increasing left-sided back pain and abdominal pain  Initially, it seems that the complaint was more of constipation and he has not had a bowel movement for about a day  He has nausea but no note of vomiting without any constipation  He was also complaining of left-sided flank pain    With that, the patient had a CT scan of the abdomen which did show the presence of distal urolithiasis with resultant hydronephrosis of the left kidney  There was no constipation found  He also has a mildly elevated BUN creatinine from previous currently at 23/1 87 from previous 21/1 65 and sometimes could be as low as 17/1  13  The patient is therefore referred to urology due to the presence of the hydronephrosis and most likely need for stone extraction  (Katherin Singh)      Review of Systems:    Constitutional:  Denies fever or chills   Eyes:  Denies change in visual acuity   HENT:  Denies nasal congestion or sore throat   Respiratory:  Denies cough or shortness of breath   Cardiovascular:  Denies chest pain or edema   GI:  Left-sided abdominal pain with nausea no vomiting, no diarrhea    :  Denies dysuria   Musculoskeletal:  Denies back pain with flank pain or joint pain   Integument:  Denies rash   Neurologic:  Denies headache, focal weakness or sensory changes   Endocrine:  Denies polyuria or polydipsia   Lymphatic:  Denies swollen glands   Psychiatric:  Denies depression or anxiety     Past Medical and Surgical History:   Past Medical History:   Diagnosis Date    Anesthesia     "once in awhile takes awhile to wake up:    Arthritis     Balance problems     Cancer ([facility] [address] )     Cardiac disease     Coronary artery disease     CPAP (continuous positive airway pressure) dependence     Dry mouth     Full dentures     Full dentures     History of coronary artery stent placement     x2, "approx 20 yrs ago or more"    History of radiation therapy     "20 treatments for prostate" "approx 10yrs ago"    [place] (hard of hearing)     Hyperlipidemia     Hypertension     Kidney stone     Neck pain     Phimosis     circumcison today 7/12/2019    Prostate CA (HCC)     Risk for falls     Shortness of breath     occas    Sleep apnea     Swelling of ankle     occas both ankles    Urinary frequency     Urinary urgency     Vertigo     Wears glasses     Wears hearing aid in both ears      Past Surgical History:   Procedure Laterality Date    CAROTID ENDARTERECTOMY Left     CHOLECYSTECTOMY      COLONOSCOPY      CORONARY STENT PLACEMENT      x2    CYSTOSCOPY      CYSTOSCOPY W/ URETERAL STENT REMOVAL      DENTAL SURGERY      HEMORRHOID SURGERY      ME CIRCUMCISION,OTHR N/A 7/12/2019    Procedure: CIRCUMCISION ADULT;  Surgeon: Libertad Mahoney MD;  [facility];  Service: Urology    ME CYSTO/URETERO W/LITHOTRIPSY &INDWELL STENT INSRT Right 12/25/2017    Procedure: CYSTOSCOPY URETEROSCOPY WITH LITHOTRIPSY HOLMIUM LASER, RETROGRADE PYELOGRAM AND INSERTION STENT URETERAL;  Surgeon: Lawrence Colindres MD;  [facility];  Service: Urology    ME CYSTO/URETERO W/LITHOTRIPSY &INDWELL STENT INSRT Right 1/19/2018    Procedure: CYSTOSCOPY URETEROSCOPIC STONE EXTRACTION  WITH LITHOTRIPSY HOLMIUM LASER, RETROGRADE PYELOGRAM AND INSERTION STENT URETERAL;  Surgeon: Libertad Mahoney MD;  [facility];  Service: Urology    ME FRAGMENT KIDNEY STONE/ ESWL Right 4/26/2018    Procedure: RIGHT LITHROTRIPSY EXTRACORPORAL SHOCKWAVE (ESWL);   Surgeon: Libertad Mahoney MD;  [facility];  Service: Urology    ROTATOR CUFF REPAIR Right        Meds/Allergies:  (Not in a hospital admission)    acetaminophen (TYLENOL) 325 mg tablet Take 650 mg by mouth every 4 (four) hours as needed   Emily Mcmillan MD Not Ordered   amLODIPine (NORVASC) 2 5 mg tablet Take 5 mg by mouth daily  Emily Mcmillan MD Reordered   Ordered as: amLODIPine (NORVASC) tablet 5 mg - 5 mg, Oral, Daily, First dose on Mon 6/14/21 at [address] [place] [address] for systolic blood pressure less than (mmHg): 110   aspirin (ADULT ASPIRIN EC LOW STRENGTH) 81 mg EC tablet Take 325 mg by mouth   Emily Mcmillan MD Not Ordered   b complex vitamins capsule Take 1 capsule by mouth daily Emily Mcmillan MD Not Ordered   CALCIUM CARBONATE-VITAMIN D PO Take 1 tablet by mouth daily 600 mg Emily Mcmillan MD Not Ordered   carvedilol (COREG) 6 25 mg tablet Take 12 5 mg by mouth 2 (two) times a day with meals Orlando Brizuela MD Reordered   Ordered as: carvedilol (COREG) tablet 12 5 mg - 12 5 mg, Oral, 2 times daily with meals, First dose on Mon 6/14/21 at [address] for heart rate less than 50 beats per minute  Look alike sound alike  Hold for systolic blood pressure less than (mmHg): 110   clotrimazole (LOTRIMIN) 1 % cream Apply to groin area 2 times daily Orlando Brizuela MD Not Ordered   escitalopram (LEXAPRO) 10 mg tablet Take 10 mg by mouth daily Orlando Brizuela MD Reordered   Ordered as: escitalopram (LEXAPRO) tablet 10 mg - 10 mg, Oral, Daily, First dose on Mon 6/14/21 at 0900 [facility]    ezetimibe (ZETIA) 10 mg tablet Take 10 mg by mouth daily Orlando Brizuela MD Reordered   Ordered as: ezetimibe (ZETIA) tablet 10 mg - 10 mg, Oral, Daily, First dose on Mon 6/14/21 at 0900 Administer at least 2 hours before or at least 4 hours after bile acid sequestrants  [facility]    furosemide (LASIX) 20 mg tablet Take 20 mg by mouth 2 (two) times a day Orlando Brizuela MD Not Ordered   ibuprofen (MOTRIN) 200 mg tablet Take by mouth as needed   Orlando Brizuela MD Not Ordered   levothyroxine 50 mcg tablet Take 50 mcg by mouth daily after dinner  Orlando Brizuela MD Reordered   Ordered as: levothyroxine tablet 50 mcg - 50 mcg, Oral, Daily after dinner, First dose on Mon 6/14/21 at 1800 Administer in the morning on an empty stomach, at least 30 to 60 minutes before food  Tablets may be crushed and suspended in 5-10mls of water for administration   [facility]     lidocaine (LIDODERM) 5 % Place 1 patch on the skin daily as needed Remove & Discard patch within 12 hours or as directed by MD Orlando Brizuela MD Reordered   Ordered as: lidocaine (LIDODERM) 5 % patch 1 patch - 1 patch, Topical, Administer over 12 Hours, Daily PRN, back and left flank, Starting on Mon 6/14/21 at [address] may remain in place for up to 12 hours in a 24hr period What is the location for this topical application?  flank left and back   meclizine (ANTIVERT) 25 mg tablet Take 32 mg by mouth 2 (two) times a day Spouse states patient has been taking this medication for last 2 weeks  Derrick Madrid MD Reordered   Ordered as: meclizine (ANTIVERT) tablet 31 25 mg - 31 25 mg (rounded from 32 mg), Oral, 2 times daily, First dose on Mon 6/14/21 at 0900   meloxicam (MOBIC) 15 mg tablet Take 15 mg by mouth daily Derrick Madrid MD Not Ordered   metFORMIN (GLUCOPHAGE) 1000 MG tablet  Derrick Madrid MD Not Ordered   metFORMIN (GLUCOPHAGE) 500 mg tablet Take 1,000 mg by mouth 2 (two) times a day with meals  Derrick Madrid MD Not Ordered   mupirocin (BACTROBAN) 2 % ointment Apply topically 2 (two) times a day for 5 days Apply to tip of penis Derrick Madrid MD Not Ordered   omeprazole (PriLOSEC) 40 MG capsule daily    Derrick Madrid MD Reordered   Ordered as: pantoprazole (PROTONIX) EC tablet 40 mg - 40 mg, Oral, Daily (early morning), First dose on Mon 6/14/21 at 0600 Swallow whole; do not crush, chew or split  [facility]    rosuvastatin (CRESTOR) 20 MG tablet Take 40 mg by mouth daily  Derrick Madrid MD Reordered   Ordered as: atorvastatin (LIPITOR) tablet 40 mg - 40 mg, Oral, Daily with dinner, First dose on Mon 6/14/21 at 1630   tamsulosin (FLOMAX) 0 4 mg Take 1 capsule (0 4 mg total) by mouth daily with dinner Derrick Madrid MD Reordered   Ordered as: tamsulosin (FLOMAX) capsule 0 4 mg - 0 4 mg, Oral, Daily with dinner, First dose on Mon 6/14/21 at [id number] Swallow whole; do not crush, chew, or open    venlafaxine (EFFEXOR) 75 mg tablet Take 75 mg by mouth daily Pt takes along with 150mg cap once a day for total dose of 225mg once a day  Derrick Madrid MD Reordered   Ordered as: venlafaxine [facility]) tablet 75 mg - 75 mg, Oral, Daily, First dose on Mon 6/14/21 at 0900 [facility]          Allergies:    Allergies   Allergen Reactions    Indomethacin Other (See Comments)     Pt unsure of reaction     Iodinated Diagnostic Agents     Lisinopril Other (See Comments)     COUGH,,pt cant remember    Niacin      Other reaction(s): Other (See Comments)  red, flushed skin    Nystatin      Pt unsure    Rosuvastatin Other (See Comments)     MUSCLE ACHES,, pt unsure of reaction     History:  Marital Status: /Civil Union   Occupation:  He is retired  Patient Pre-hospital Living Situation:  Lives at home  Patient Pre-hospital Level of Mobility:  Needing assistance  Patient Pre-hospital Diet Restrictions:  Regular  Substance Use History:   Social History     Substance and Sexual Activity   Alcohol Use Yes    Alcohol/week: 1 0 standard drinks    Types: 1 Cans of beer per week    Comment: occasionally     Social History     Tobacco Use   Smoking Status Current Some Day Smoker    Years: 57 00    Types: Cigars   Smokeless Tobacco Never Used   Tobacco Comment    declined     Social History     Substance and Sexual Activity   Drug Use No       Family History:  History reviewed  No pertinent family history  Physical Exam:     Vitals:   Blood Pressure: 168/84 (06/14/21 0457)  Pulse: 88 (06/14/21 0457)  Temperature: 97 7 °F (36 5 °C) (06/14/21 0244)  Temp Source: Oral (06/14/21 0244)  Respirations: 17 (06/14/21 0457)  Height: 5' 7" (170 2 cm) (06/14/21 0244)  Weight - Scale: 90 7 kg (200 lb) (06/14/21 0244)  SpO2: 90 % (06/14/21 0457)    Constitutional:  Well developed, well nourished, no acute distress, non-toxic appearance   Eyes:  PERRL, conjunctiva normal   HENT:  Atraumatic, external ears normal, nose normal, oropharynx dry, no pharyngeal exudates   Neck- normal range of motion, no tenderness, supple   Respiratory:  No respiratory distress, normal breath sounds, no rales, no wheezing   Cardiovascular:  Normal rate, normal rhythm, no murmurs, no gallops, no rubs   GI:  Soft, nondistended, normal bowel sounds, nontender, no organomegaly, no mass, no rebound, no guarding   :  Left-sided CVA tenderness  Musculoskeletal:  No edema, no tenderness, with left-sided discomfort, no deformities  Back- no tenderness  Integument:  Well hydrated, no rash   Lymphatic:  No lymphadenopathy noted   Neurologic:  Alert &awake, communicative, CN 2-12 normal, normal motor function, normal sensory function, no focal deficits noted   Psychiatric:  Speech and behavior appropriate       Lab Results: I have personally reviewed pertinent reports  Results from last 7 days   Lab Units 06/14/21  0326   WBC Thousand/uL 9 99   HEMOGLOBIN g/dL 12 3   HEMATOCRIT % 37 1   PLATELETS Thousands/uL 143*   NEUTROS PCT % 79*   LYMPHS PCT % 8*   MONOS PCT % 12   EOS PCT % 1     Results from last 7 days   Lab Units 06/14/21  0300   POTASSIUM mmol/L 5 5*   CHLORIDE mmol/L 100   CO2 mmol/L 26   BUN mg/dL 23   CREATININE mg/dL 1 87*   CALCIUM mg/dL 9 6   ALK PHOS U/L 49   ALT U/L 25   AST U/L 89*               Imaging: I have personally reviewed pertinent reports  CT abdomen pelvis wo contrast    Result Date: 6/14/2021  Narrative: CT ABDOMEN AND PELVIS WITHOUT IV CONTRAST INDICATION:   n/v, no bm  COMPARISON:  None  TECHNIQUE:  CT examination of the abdomen and pelvis was performed without intravenous contrast   Axial, sagittal, and coronal 2D reformatted images were created from the source data and submitted for interpretation  Radiation dose length product (DLP) for this visit:  1083 34 mGy-cm   This examination, like all CT scans performed in the [facility], was performed utilizing techniques to minimize radiation dose exposure, including the use of iterative reconstruction and automated exposure control  Enteric contrast was administered  FINDINGS: ABDOMEN LOWER CHEST:  Atelectatic changes are noted at the lung bases  LIVER/BILIARY TREE:  Unremarkable  GALLBLADDER:  Gallbladder is surgically absent  SPLEEN:  Unremarkable  PANCREAS:  Unremarkable   ADRENAL GLANDS:  Unremarkable  KIDNEYS/URETERS:  Bilateral nonobstructing renal calculi measuring up to 5 mm at the right inferior pole  Left hydronephrosis secondary to 7 x 4 x 5 mm stone at the proximal left ureter  Simple fluid layers along the left retroperitoneum  STOMACH AND BOWEL:  There is colonic diverticulosis without evidence of acute diverticulitis  APPENDIX:  No findings to suggest appendicitis  ABDOMINOPELVIC CAVITY:  No ascites  No pneumoperitoneum  No lymphadenopathy  VESSELS:  Unremarkable for patient's age  PELVIS REPRODUCTIVE ORGANS:  Unremarkable for patient's age  URINARY BLADDER:  Unremarkable  ABDOMINAL WALL/INGUINAL REGIONS:  Unremarkable  OSSEOUS STRUCTURES:  No acute fracture or destructive osseous lesion  Impression: Left hydronephrosis secondary to a 7 mm stone at the proximal left ureter  Simple fluid layering along the left retroperitoneum may indicate forniceal rupture  Additional bilateral nonobstructing nephrolithiasis  Workstation performed: [id number]         ** Please Note: Dragon 360 Dictation voice to text software was used in the creation of this document   **

## 2021-06-14 NOTE — ASSESSMENT & PLAN NOTE
· History of, details unclear, prior stenting, follows with [facility]

## 2021-06-14 NOTE — H&P (VIEW-ONLY)
[address] NOTE   Admission Date: 6/14/2021    Patient Identifiers: Yonathan Davidson (MRN: [id number])  Service Requesting Consultation: Dev Panda MD  Service Providing Consultation:  Urology, Fran Dandy, PA-C  Consults  Date of Service: 6/14/2021  Reason for Consultation:  Left ureteral calculi with hydronephrosis    History of Present Illness:     Yonathan Davidson is a [de-identified] y o  male known to Dr Keyur Salazar history of prostate cancer treated with radiation in 2012  He also has a history kidney stones  His last cysto and stent placements were in 2017 and 2000  He presented emergency room with several days left flank pain  CT abdomen pelvis showed a 7 mm stone in the proximal left ureter with hydronephrosis  He is afebrile  Creatinine 1 87  WBC 9 99  He has nausea and gagging  He denies any gross hematuria      Past Medical, Past Surgical History:     Past Medical History:   Diagnosis Date    Anesthesia     "once in awhile takes awhile to wake up:    Arthritis     Balance problems     Cancer ([address] )     Cardiac disease     Coronary artery disease     CPAP (continuous positive airway pressure) dependence     Dry mouth     Full dentures     Full dentures     History of coronary artery stent placement     x2, "approx 20 yrs ago or more"    History of radiation therapy     "20 treatments for prostate" "approx 10yrs ago"    [place] (hard of hearing)     Hyperlipidemia     Hypertension     Kidney stone     Neck pain     Phimosis     circumcison today 7/12/2019    Prostate CA ([address] )     Risk for falls     Shortness of breath     occas    Sleep apnea     Swelling of ankle     occas both ankles    Urinary frequency     Urinary urgency     Vertigo     Wears glasses     Wears hearing aid in both ears    :    Past Surgical History:   Procedure Laterality Date    CAROTID ENDARTERECTOMY Left     CHOLECYSTECTOMY      COLONOSCOPY  CORONARY STENT PLACEMENT      x2    CYSTOSCOPY      CYSTOSCOPY W/ URETERAL STENT REMOVAL      DENTAL SURGERY      HEMORRHOID SURGERY      MO CIRCUMCISION,OTHR N/A 7/12/2019    Procedure: CIRCUMCISION ADULT;  Surgeon: Jacob Fox MD;  [facility];  Service: Urology    MO CYSTO/URETERO W/LITHOTRIPSY &INDWELL STENT INSRT Right 12/25/2017    Procedure: CYSTOSCOPY URETEROSCOPY WITH LITHOTRIPSY HOLMIUM LASER, RETROGRADE PYELOGRAM AND INSERTION STENT URETERAL;  Surgeon: Saintclair Lah, MD;  [facility];  Service: Urology    MO CYSTO/URETERO W/LITHOTRIPSY &INDWELL STENT INSRT Right 1/19/2018    Procedure: CYSTOSCOPY URETEROSCOPIC STONE EXTRACTION  WITH LITHOTRIPSY HOLMIUM LASER, RETROGRADE PYELOGRAM AND INSERTION STENT URETERAL;  Surgeon: Jacob Fox MD;  [facility];  Service: Urology    MO FRAGMENT KIDNEY STONE/ ESWL Right 4/26/2018    Procedure: RIGHT LITHROTRIPSY EXTRACORPORAL SHOCKWAVE (ESWL);   Surgeon: Jacob Fox MD;  [facility];  Service: Urology    ROTATOR CUFF REPAIR Right    :    Medications, Allergies:     Current Facility-Administered Medications:     acetaminophen (TYLENOL) tablet 650 mg, 650 mg, Oral, Q6H PRN, Derrick Madrid MD    aluminum-magnesium hydroxide-simethicone (MYLANTA) oral suspension 30 mL, 30 mL, Oral, Q6H PRN, Derrick Madrid MD    amLODIPine (NORVASC) tablet 5 mg, 5 mg, Oral, Daily, Derrick Madrid MD    atorvastatin (LIPITOR) tablet 40 mg, 40 mg, Oral, Daily With Dinner, Derrick Madrid MD    carvedilol (COREG) tablet 12 5 mg, 12 5 mg, Oral, BID With Meals, Derrick Madrid MD    docusate sodium (COLACE) capsule 100 mg, 100 mg, Oral, BID PRN, Derrick Madrid MD    escitalopram (LEXAPRO) tablet 10 mg, 10 mg, Oral, Daily, Derrick Madrid MD    ezetimibe (ZETIA) tablet 10 mg, 10 mg, Oral, Daily, Derrick Madrid MD    heparin (porcine) subcutaneous injection 5,000 Units, 5,000 Units, Subcutaneous, Q8H [address] **AND** Platelet count, , , Once, Francesco Crocker MD    HYDROmorphone HCl (DILAUDID) injection 0 2 mg, 0 2 mg, Intravenous, Q4H PRN, Francesco Crocker MD, 0 2 mg at 06/14/21 0831    levothyroxine tablet 50 mcg, 50 mcg, Oral, After Dinner, Francesco Crocker MD    lidocaine (LIDODERM) 5 % patch 1 patch, 1 patch, Topical, Daily PRN, Francesco Crocker MD    meclizine (ANTIVERT) tablet 31 25 mg, 31 25 mg, Oral, BID, Francesco Crocker MD    multi-electrolyte (PLASMALYTE-A/ISOLYTE-S PH 7 4) IV solution, 100 mL/hr, Intravenous, Continuous, Francesco Crocker MD    nicotine (NICODERM CQ) 14 mg/24hr TD 24 hr patch 1 patch, 1 patch, Transdermal, Daily, Francesco Crocker MD    ondansetron [facility]) injection 4 mg, 4 mg, Intravenous, Q6H PRN, Francesco Crocker MD    oxyCODONE (ROXICODONE) IR tablet 2 5 mg, 2 5 mg, Oral, Q4H PRN, Francesco Crocker MD    oxyCODONE (ROXICODONE) IR tablet 5 mg, 5 mg, Oral, Q4H PRN, Francesco Crocker MD    pantoprazole (PROTONIX) EC tablet 40 mg, 40 mg, Oral, Early Morning, Francesco Crocker MD    [facility]) capsule 0 4 mg, 0 4 mg, Oral, Daily With Michael Lai MD    venlafaxine [facility]) tablet 75 mg, 75 mg, Oral, Daily, Francesco Crocker MD    Current Outpatient Medications:     acetaminophen (TYLENOL) 325 mg tablet, Take 650 mg by mouth every 4 (four) hours as needed  , Disp: , Rfl:     amLODIPine (NORVASC) 2 5 mg tablet, Take 5 mg by mouth daily , Disp: , Rfl:     aspirin (ADULT ASPIRIN EC LOW STRENGTH) 81 mg EC tablet, Take 325 mg by mouth  , Disp: , Rfl:     b complex vitamins capsule, Take 1 capsule by mouth daily, Disp: , Rfl:     CALCIUM CARBONATE-VITAMIN D PO, Take 1 tablet by mouth daily 600 mg, Disp: , Rfl:     carvedilol (COREG) 6 25 mg tablet, Take 12 5 mg by mouth 2 (two) times a day with meals, Disp: , Rfl:     clotrimazole (LOTRIMIN) 1 % cream, Apply to groin area 2 times daily, Disp: 12 g, Rfl: 0    escitalopram (LEXAPRO) 10 mg tablet, Take 10 mg by mouth daily, Disp: , Rfl:     ezetimibe (ZETIA) 10 mg tablet, Take 10 mg by mouth daily, Disp: , Rfl:     furosemide (LASIX) 20 mg tablet, Take 20 mg by mouth 2 (two) times a day, Disp: , Rfl:     ibuprofen (MOTRIN) 200 mg tablet, Take by mouth as needed  , Disp: , Rfl:     levothyroxine 50 mcg tablet, Take 50 mcg by mouth daily after dinner , Disp: , Rfl:     lidocaine (LIDODERM) 5 %, Place 1 patch on the skin daily as needed Remove & Discard patch within 12 hours or as directed by MD , Disp: , Rfl:     meclizine (ANTIVERT) 25 mg tablet, Take 32 mg by mouth 2 (two) times a day Spouse states patient has been taking this medication for last 2 weeks , Disp: , Rfl:     meloxicam (MOBIC) 15 mg tablet, Take 15 mg by mouth daily, Disp: , Rfl:     metFORMIN (GLUCOPHAGE) 1000 MG tablet, , Disp: , Rfl:     metFORMIN (GLUCOPHAGE) 500 mg tablet, Take 1,000 mg by mouth 2 (two) times a day with meals , Disp: , Rfl:     mupirocin (BACTROBAN) 2 % ointment, Apply topically 2 (two) times a day for 5 days Apply to tip of penis, Disp: 15 g, Rfl: 0    omeprazole (PriLOSEC) 40 MG capsule, daily  , Disp: , Rfl:     rosuvastatin (CRESTOR) 20 MG tablet, Take 40 mg by mouth daily , Disp: , Rfl:     tamsulosin (FLOMAX) 0 4 mg, Take 1 capsule (0 4 mg total) by mouth daily with dinner, Disp: 14 capsule, Rfl: 0    venlafaxine (EFFEXOR) 75 mg tablet, Take 75 mg by mouth daily Pt takes along with 150mg cap once a day for total dose of 225mg once a day , Disp: , Rfl:     Allergies: Allergies   Allergen Reactions    Indomethacin Other (See Comments)     Pt unsure of reaction     Iodinated Diagnostic Agents     Lisinopril Other (See Comments)     COUGH,,pt cant remember    Niacin      Other reaction(s):  Other (See Comments)  red, flushed skin    Nystatin      Pt unsure    Rosuvastatin Other (See Comments)     MUSCLE ACHES,, pt unsure of reaction   :    Social and Family History:   Social History:   Social History     Tobacco Use    Smoking status: Current Some Day Smoker     Years: 57 00     Types: Cigars    Smokeless tobacco: Never Used    Tobacco comment: declined   Substance Use Topics    Alcohol use: Yes     Alcohol/week: 1 0 standard drinks     Types: 1 Cans of beer per week     Comment: occasionally    Drug use: No        Social History     Tobacco Use   Smoking Status Current Some Day Smoker    Years: 57 00    Types: Cigars   Smokeless Tobacco Never Used   Tobacco Comment    declined       Family History:  History reviewed  No pertinent family history :     Review of Systems:     General: Fever, chills, or night sweats: negative  Cardiac: Negative for chest pain  Pulmonary: Negative for shortness of breath  Gastrointestinal: Abdominal pain negative  Nausea, vomiting, or diarrhea positive,  Genitourinary: See HPI above  Patient does not have hematuria  All other systems queried were negative  Physical Exam:   General: Patient is pleasant and in NAD  Awake and alert  /92   Pulse 88   Temp 97 7 °F (36 5 °C) (Oral)   Resp 20   Ht 5' 7" (1 702 m)   Wt 90 7 kg (200 lb)   SpO2 91%   BMI 31 32 kg/m²   HEENT:  Conjunctiva are clear  Constitutional:  pleasant and cooperative     no apparent distress  Cardiac: Peripheral edema: negative  Pulmonary: Non-labored breathing  Abdomen: Soft, non-tender, non-distended  No surgical scars  No masses, tenderness, hernias noted  Genitourinary: Positive CVA tenderness, negative suprapubic tenderness  Extremities:  Moves all extremities  Neurological:CNII-XII intact  No numbness or tingling   Essentially non focal neurologic exam  Psychiatric:mood affect and behavior normal        Labs:     Lab Results   Component Value Date    HGB 12 3 06/14/2021    HCT 37 1 06/14/2021    WBC 9 99 06/14/2021     (L) 06/14/2021   ]    Lab Results   Component Value Date    K 5 5 (H) 06/14/2021     06/14/2021    CO2 26 06/14/2021    BUN 23 06/14/2021    CREATININE 1 87 (H) 06/14/2021    CALCIUM 9 6 06/14/2021   ]    Imaging:   I personally reviewed the images and report of the following studies, and reviewed them with the patient:  CT ABDOMEN AND PELVIS WITHOUT IV CONTRAST   IMPRESSION:     Left hydronephrosis secondary to a 7 mm stone at the proximal left ureter  Simple fluid layering along the left retroperitoneum may indicate forniceal rupture  Additional bilateral nonobstructing nephrolithiasis      ASSESSMENT:     1  7 mm proximal left ureter calculus  2  Bilateral nephrolithiasis  3  Acute kidney injury  4  History of prostate cancer     PLAN:   -I reviewed the options of management with the patient  -he is scheduled for laser lithotripsy and stent insertion later today  -he understands if the stone is not accessible if there is signs of infection stent would be placed in he would return as an outpatient in sometime for definitive stone treatment      Thank you for allowing me to participate in this patients care  Please do not hesitate to call with any additional questions    Khadar Fraser PA-C

## 2021-06-15 VITALS
SYSTOLIC BLOOD PRESSURE: 130 MMHG | DIASTOLIC BLOOD PRESSURE: 75 MMHG | BODY MASS INDEX: 31.56 KG/M2 | HEIGHT: 67 IN | OXYGEN SATURATION: 92 % | HEART RATE: 84 BPM | WEIGHT: 201.06 LBS | TEMPERATURE: 98.3 F | RESPIRATION RATE: 16 BRPM

## 2021-06-15 LAB
ALBUMIN SERPL BCP-MCNC: 3 G/DL (ref 3.5–5)
ALP SERPL-CCNC: 41 U/L (ref 46–116)
ALT SERPL W P-5'-P-CCNC: 15 U/L (ref 12–78)
ANION GAP SERPL CALCULATED.3IONS-SCNC: 5 MMOL/L (ref 4–13)
AST SERPL W P-5'-P-CCNC: 14 U/L (ref 5–45)
BASOPHILS # BLD AUTO: 0.04 THOUSANDS/ΜL (ref 0–0.1)
BASOPHILS NFR BLD AUTO: 1 % (ref 0–1)
BILIRUB SERPL-MCNC: 0.67 MG/DL (ref 0.2–1)
BUN SERPL-MCNC: 20 MG/DL (ref 5–25)
CALCIUM ALBUM COR SERPL-MCNC: 9.1 MG/DL (ref 8.3–10.1)
CALCIUM SERPL-MCNC: 8.3 MG/DL (ref 8.3–10.1)
CHLORIDE SERPL-SCNC: 106 MMOL/L (ref 100–108)
CO2 SERPL-SCNC: 28 MMOL/L (ref 21–32)
CREAT SERPL-MCNC: 1.33 MG/DL (ref 0.6–1.3)
EOSINOPHIL # BLD AUTO: 0.17 THOUSAND/ΜL (ref 0–0.61)
EOSINOPHIL NFR BLD AUTO: 3 % (ref 0–6)
ERYTHROCYTE [DISTWIDTH] IN BLOOD BY AUTOMATED COUNT: 13.8 % (ref 11.6–15.1)
GFR SERPL CREATININE-BSD FRML MDRD: 50 ML/MIN/1.73SQ M
GLUCOSE P FAST SERPL-MCNC: 128 MG/DL (ref 65–99)
GLUCOSE SERPL-MCNC: 128 MG/DL (ref 65–140)
HCT VFR BLD AUTO: 35.5 % (ref 36.5–49.3)
HGB BLD-MCNC: 11.7 G/DL (ref 12–17)
IMM GRANULOCYTES # BLD AUTO: 0.02 THOUSAND/UL (ref 0–0.2)
IMM GRANULOCYTES NFR BLD AUTO: 0 % (ref 0–2)
LYMPHOCYTES # BLD AUTO: 1.35 THOUSANDS/ΜL (ref 0.6–4.47)
LYMPHOCYTES NFR BLD AUTO: 23 % (ref 14–44)
MAGNESIUM SERPL-MCNC: 2.2 MG/DL (ref 1.6–2.6)
MCH RBC QN AUTO: 32.5 PG (ref 26.8–34.3)
MCHC RBC AUTO-ENTMCNC: 33 G/DL (ref 31.4–37.4)
MCV RBC AUTO: 99 FL (ref 82–98)
MONOCYTES # BLD AUTO: 0.81 THOUSAND/ΜL (ref 0.17–1.22)
MONOCYTES NFR BLD AUTO: 14 % (ref 4–12)
NEUTROPHILS # BLD AUTO: 3.42 THOUSANDS/ΜL (ref 1.85–7.62)
NEUTS SEG NFR BLD AUTO: 59 % (ref 43–75)
NRBC BLD AUTO-RTO: 0 /100 WBCS
PHOSPHATE SERPL-MCNC: 2.9 MG/DL (ref 2.3–4.1)
PLATELET # BLD AUTO: 139 THOUSANDS/UL (ref 149–390)
PMV BLD AUTO: 10 FL (ref 8.9–12.7)
POTASSIUM SERPL-SCNC: 3.7 MMOL/L (ref 3.5–5.3)
PROT SERPL-MCNC: 5.9 G/DL (ref 6.4–8.2)
RBC # BLD AUTO: 3.6 MILLION/UL (ref 3.88–5.62)
SODIUM SERPL-SCNC: 139 MMOL/L (ref 136–145)
TSH SERPL DL<=0.05 MIU/L-ACNC: 2.38 UIU/ML (ref 0.36–3.74)
WBC # BLD AUTO: 5.81 THOUSAND/UL (ref 4.31–10.16)

## 2021-06-15 PROCEDURE — 84100 ASSAY OF PHOSPHORUS: CPT | Performed by: UROLOGY

## 2021-06-15 PROCEDURE — 80053 COMPREHEN METABOLIC PANEL: CPT | Performed by: UROLOGY

## 2021-06-15 PROCEDURE — 84443 ASSAY THYROID STIM HORMONE: CPT | Performed by: UROLOGY

## 2021-06-15 PROCEDURE — 83735 ASSAY OF MAGNESIUM: CPT | Performed by: UROLOGY

## 2021-06-15 PROCEDURE — 85025 COMPLETE CBC W/AUTO DIFF WBC: CPT | Performed by: UROLOGY

## 2021-06-15 PROCEDURE — 99217 PR OBSERVATION CARE DISCHARGE MANAGEMENT: CPT | Performed by: PHYSICIAN ASSISTANT

## 2021-06-15 RX ORDER — CEPHALEXIN 500 MG/1
500 CAPSULE ORAL EVERY 6 HOURS SCHEDULED
Qty: 12 CAPSULE | Refills: 0 | Status: SHIPPED | OUTPATIENT
Start: 2021-06-15 | End: 2021-06-18

## 2021-06-15 RX ADMIN — CARVEDILOL 12.5 MG: 12.5 TABLET, FILM COATED ORAL at 07:30

## 2021-06-15 RX ADMIN — EZETIMIBE 10 MG: 10 TABLET ORAL at 09:32

## 2021-06-15 RX ADMIN — HEPARIN SODIUM 5000 UNITS: 5000 INJECTION INTRAVENOUS; SUBCUTANEOUS at 06:05

## 2021-06-15 RX ADMIN — SODIUM CHLORIDE, SODIUM GLUCONATE, SODIUM ACETATE, POTASSIUM CHLORIDE, MAGNESIUM CHLORIDE, SODIUM PHOSPHATE, DIBASIC, AND POTASSIUM PHOSPHATE 100 ML/HR: .53; .5; .37; .037; .03; .012; .00082 INJECTION, SOLUTION INTRAVENOUS at 04:04

## 2021-06-15 RX ADMIN — MECLIZINE HYDROCHLORIDE 31.25 MG: 25 TABLET ORAL at 09:34

## 2021-06-15 RX ADMIN — PANTOPRAZOLE SODIUM 40 MG: 40 TABLET, DELAYED RELEASE ORAL at 06:05

## 2021-06-15 RX ADMIN — AMLODIPINE BESYLATE 5 MG: 5 TABLET ORAL at 09:32

## 2021-06-15 RX ADMIN — ESCITALOPRAM OXALATE 10 MG: 10 TABLET ORAL at 09:32

## 2021-06-15 NOTE — NURSING NOTE
Pt  Iv removed for discharge and instructions given  Pt  Will have daughter  medications from [facility] in [place]

## 2021-06-15 NOTE — TELEPHONE ENCOUNTER
Patient scheduled with Dr Robin Melvin 6/23/21  Pt's daughter will call back to let me know if pt can make appt

## 2021-06-15 NOTE — DISCHARGE SUMMARY
[address] [facility]  Discharge- Kermit Bal 1940, [de-identified] y o  male MRN: [id number]  Unit/Bed#: [facility] 302-01 Encounter: [id number]  Primary Care Provider: Eliazar Robison MD   Date and time admitted to hospital: 6/14/2021  2:40 AM    * Hydronephrosis of left kidney  Assessment & Plan  Presented with left-sided flank pain found to have a 7 mm stone at the proximal left ureter  · Urology following, appreciate input  · Status post cystoscopy with left ureteral stent insertion on 06/14  · Patient will need 2nd procedure for definitive treatment of stone  · Outpatient urology follow-up  · Keflex ordered per Urology    History of prostate cancer  Assessment & Plan  · Treated with radiation in 2012    Essential hypertension  Assessment & Plan  Pressure acceptable  · Continue amlodipine 2 5 mg daily  · Coreg 6 252 tablets twice daily with meals  CAD (coronary artery disease)  Assessment & Plan  · History of, details unclear, prior stenting, follows with [facility]    Mixed hyperlipidemia  Assessment & Plan  · Continue home medications    Acquired hypothyroidism  Assessment & Plan  · Continue home Synthroid        Medical Problems     Resolved Problems  Date Reviewed: 6/15/2021    None              Discharging Physician / Practitioner: Whit Harris PA-C  PCP: Eliazar Robison MD  Admission Date:   Admission Orders (From admission, onward)     Ordered        06/14/21 0512  Place in Observation  Once                   Discharge Date: 06/15/21    Consultations During Hospital Stay:  · Dr Naty Delgado    Procedures Performed:     6/14 - cystoscopy with left ureteral stent placement    CT abd/pelvis - Left hydronephrosis secondary to a 7 mm stone at the proximal left ureter  Simple fluid layering along the left retroperitoneum may indicate forniceal rupture  Additional bilateral nonobstructing nephrolithiasis      Significant Findings / Test Results:   · See above    Incidental Findings:   · none     Test Results Pending at Discharge (will require follow up):   · none     Outpatient Tests Requested:  · Ureteroscopy as outpatient per Urology    Complications:  none    Reason for Admission:  Left flank pain    Hospital Course:   Delores Alberto is a [de-identified] y o  male patient who originally presented to the hospital on 6/14/2021 due to left flank pain  Patient was found have a left obstructing nephrolithiasis  Patient was seen in consultation by Urology  A cystoscopy, retrograde pyelogram and left ureteral stent placement was performed on 06/14  Patient tolerated the procedure without complications  Plan is to follow up with Urology as an outpatient for a definitive ureteroscopy and stone treatment  Please see above list of diagnoses and related plan for additional information  Condition at Discharge: good    Discharge Day Visit / Exam:   Subjective:  Patient complains of some burning with urination, but it is greatly improved from yesterday  Denies any abdominal pain  Tolerating diet  Vitals: Blood Pressure: 130/75 (06/15/21 0700)  Pulse: 84 (06/15/21 0700)  Temperature: 98 3 °F (36 8 °C) (06/15/21 0700)  Temp Source: Oral (06/15/21 0700)  Respirations: 16 (06/15/21 0700)  Height: 5' 7" (170 2 cm) (06/14/21 0244)  Weight - Scale: 91 2 kg (201 lb 1 oz) (06/15/21 0600)  SpO2: 95 % (06/15/21 0700)  Exam:   Physical Exam  Constitutional:       Appearance: Normal appearance  He is obese  HENT:      Head: Normocephalic and atraumatic  Cardiovascular:      Rate and Rhythm: Normal rate and regular rhythm  Heart sounds: No murmur heard  Pulmonary:      Effort: Pulmonary effort is normal       Breath sounds: Normal breath sounds  Abdominal:      General: Bowel sounds are normal       Palpations: Abdomen is soft  Tenderness: There is no abdominal tenderness  Neurological:      Mental Status: He is alert and oriented to person, place, and time  Mental status is at baseline  Psychiatric:         Mood and Affect: Mood normal           Discussion with Family: Updated  (daughter) via phone  Discharge instructions/Information to patient and family:   See after visit summary for information provided to patient and family  Provisions for Follow-Up Care:  See after visit summary for information related to follow-up care and any pertinent home health orders  Disposition:   Home    Planned Readmission: none     Discharge Statement:  I spent 40 minutes discharging the patient  This time was spent on the day of discharge  I had direct contact with the patient on the day of discharge  Greater than 50% of the total time was spent examining patient, answering all patient questions, arranging and discussing plan of care with patient as well as directly providing post-discharge instructions  Additional time then spent on discharge activities  Discharge Medications:  See after visit summary for reconciled discharge medications provided to patient and/or family        **Please Note: This note may have been constructed using a voice recognition system**

## 2021-06-15 NOTE — ASSESSMENT & PLAN NOTE
Presented with left-sided flank pain found to have a 7 mm stone at the proximal left ureter  · Urology following, appreciate input  · Status post cystoscopy with left ureteral stent insertion on 06/14  · Patient will need 2nd procedure for definitive treatment of stone    · Outpatient urology follow-up  · Keflex ordered per Urology

## 2021-06-16 LAB — BACTERIA UR CULT: NORMAL

## 2021-06-16 NOTE — TELEPHONE ENCOUNTER
I spoke to the patients daughter Gilmer Doherty, scheduled the 2nd stage stone treatment for 7/6/2021 at [facility] while Dr Les Waters is on [facility] call   Patient will keep 6/23/21 H&P visit      -instructions given verbally and mailed  -patient will need to stop his Aspirin 7 days prior  -I will ask for Urine C&S to be sent from office 6/23/21, order In chart  -CBC, CMP- done 6/16/21  -EKG - 3/16/21 at [facility]  -[facility]/[employer] - no auth required

## 2021-06-17 NOTE — ED ATTENDING ATTESTATION
6/14/2021  IDimitry DO, saw and evaluated the patient  I have discussed the patient with the resident/non-physician practitioner and agree with the resident's/non-physician practitioner's findings, Plan of Care, and MDM as documented in the resident's/non-physician practitioner's note, except where noted  All available labs and Radiology studies were reviewed  I was present for key portions of any procedure(s) performed by the resident/non-physician practitioner and I was immediately available to provide assistance  At this point I agree with the current assessment done in the Emergency Department  I have conducted an independent evaluation of this patient a history and physical is as follows:    70-year-old male presents for abdominal pain  Initially thought was constipation because he has had no bowel movement for the past two days  Left-sided abdominal pain  Does radiate throughout the whole abdomen  Had episodes of vomiting  Severe pain does not radiate no hematuria no dysuria    Plan is to check CT to rule out diverticulitis small-bowel obstruction given history of cholecystectomy kidney stone pain control with IV narcotics reassess for disposition    ED Course         Critical Care Time  Procedures

## 2021-06-23 ENCOUNTER — OFFICE VISIT (OUTPATIENT)
Dept: UROLOGY | Facility: MEDICAL CENTER | Age: 81
End: 2021-06-23
Payer: MEDICARE

## 2021-06-23 VITALS
SYSTOLIC BLOOD PRESSURE: 132 MMHG | HEIGHT: 67 IN | HEART RATE: 85 BPM | DIASTOLIC BLOOD PRESSURE: 82 MMHG | WEIGHT: 202 LBS | BODY MASS INDEX: 31.71 KG/M2

## 2021-06-23 DIAGNOSIS — N13.30 HYDRONEPHROSIS OF LEFT KIDNEY: ICD-10-CM

## 2021-06-23 DIAGNOSIS — N20.0 KIDNEY STONE: Primary | ICD-10-CM

## 2021-06-23 PROCEDURE — 99214 OFFICE O/P EST MOD 30 MIN: CPT | Performed by: UROLOGY

## 2021-06-23 PROCEDURE — 87086 URINE CULTURE/COLONY COUNT: CPT | Performed by: UROLOGY

## 2021-06-23 RX ORDER — VENLAFAXINE HYDROCHLORIDE 75 MG/1
CAPSULE, EXTENDED RELEASE ORAL
COMMUNITY
Start: 2021-05-09

## 2021-06-23 NOTE — PROGRESS NOTES
Assessment/Plan:    Kidney stone  The patient underwent left stent placement for a left upper ureteral stone that migrated back into the kidney  We will proceed with definitive ureteroscopy on July 6  Procedure was discussed in detail  Risks were discussed and consent form signed  We will obtain a preoperative urine culture today  Diagnoses and all orders for this visit:    Kidney stone    Hydronephrosis of left kidney    Other orders  -     venlafaxine (EFFEXOR-XR) 75 mg 24 hr capsule          Subjective:      Patient ID: Ashley Bernal is a [de-identified] y o  male  Complaint:  Left ureteral stone    HPI:  49-year-old male underwent left stent placement on June 16th  He presents today to discuss definitive ureteroscopy  There is no fever or chills  He does have intermittent flank pain from the stent as well as gross hematuria  He is voiding adequately but does have urinary urgency and pain with urination  The following portions of the patient's history were reviewed and updated as appropriate: allergies, current medications, past family history, past medical history, past social history, past surgical history and problem list     Review of Systems   Constitutional: Negative for chills, diaphoresis, fatigue and fever  HENT: Negative  Eyes: Negative  Respiratory: Negative  Cardiovascular: Negative  Endocrine: Negative  Genitourinary: Positive for flank pain, hematuria and urgency  See HPI   Skin: Negative  Allergic/Immunologic: Negative  Neurological: Negative  Hematological: Negative  Psychiatric/Behavioral: Negative  Objective:      /82   Pulse 85   Ht 5' 7" (1 702 m)   Wt 91 6 kg (202 lb)   BMI 31 64 kg/m²          Physical Exam  Constitutional:       General: He is not in acute distress  Appearance: Normal appearance  He is well-developed  He is not ill-appearing, toxic-appearing or diaphoretic     HENT:      Head: Normocephalic and atraumatic  Eyes:      General: No scleral icterus  Conjunctiva/sclera: Conjunctivae normal    Cardiovascular:      Rate and Rhythm: Normal rate and regular rhythm  Pulmonary:      Effort: Pulmonary effort is normal    Abdominal:      General: There is no distension  Palpations: There is no mass  Tenderness: There is no abdominal tenderness  There is no right CVA tenderness, left CVA tenderness, guarding or rebound  Hernia: No hernia is present  Comments: No hernia   Genitourinary:     Penis: Normal        Comments: Testes descended and normal to palpation  Musculoskeletal:      Cervical back: Normal range of motion and neck supple  Skin:     General: Skin is warm and dry  Neurological:      General: No focal deficit present  Mental Status: He is alert and oriented to person, place, and time  Psychiatric:         Mood and Affect: Mood normal          Behavior: Behavior normal          Thought Content:  Thought content normal          Judgment: Judgment normal

## 2021-06-23 NOTE — H&P (VIEW-ONLY)
Assessment/Plan:    Kidney stone  The patient underwent left stent placement for a left upper ureteral stone that migrated back into the kidney  We will proceed with definitive ureteroscopy on July 6  Procedure was discussed in detail  Risks were discussed and consent form signed  We will obtain a preoperative urine culture today  There are no diagnoses linked to this encounter  Subjective:      Patient ID: Ivan Gore is a [de-identified] y o  male  Complaint:  Left ureteral stone    HPI:  44-year-old male underwent left stent placement on June 16th  He presents today to discuss definitive ureteroscopy  There is no fever or chills  He does have intermittent flank pain from the stent as well as gross hematuria  He is voiding adequately but does have urinary urgency and pain with urination  The following portions of the patient's history were reviewed and updated as appropriate: allergies, current medications, past family history, past medical history, past social history, past surgical history and problem list     Review of Systems   Constitutional: Negative for chills, diaphoresis, fatigue and fever  HENT: Negative  Eyes: Negative  Respiratory: Negative  Cardiovascular: Negative  Endocrine: Negative  Genitourinary: Positive for flank pain, hematuria and urgency  See HPI   Skin: Negative  Allergic/Immunologic: Negative  Neurological: Negative  Hematological: Negative  Psychiatric/Behavioral: Negative  Objective:      /82   Pulse 85   Ht 5' 7" (1 702 m)   Wt 91 6 kg (202 lb)   BMI 31 64 kg/m²          Physical Exam  Constitutional:       General: He is not in acute distress  Appearance: Normal appearance  He is well-developed  He is not ill-appearing, toxic-appearing or diaphoretic  HENT:      Head: Normocephalic and atraumatic  Eyes:      General: No scleral icterus       Conjunctiva/sclera: Conjunctivae normal    Cardiovascular: Rate and Rhythm: Normal rate and regular rhythm  Pulmonary:      Effort: Pulmonary effort is normal    Abdominal:      General: There is no distension  Palpations: There is no mass  Tenderness: There is no abdominal tenderness  There is no right CVA tenderness, left CVA tenderness, guarding or rebound  Hernia: No hernia is present  Comments: No hernia   Genitourinary:     Penis: Normal        Comments: Testes descended and normal to palpation  Musculoskeletal:      Cervical back: Normal range of motion and neck supple  Skin:     General: Skin is warm and dry  Neurological:      General: No focal deficit present  Mental Status: He is alert and oriented to person, place, and time  Psychiatric:         Mood and Affect: Mood normal          Behavior: Behavior normal          Thought Content:  Thought content normal          Judgment: Judgment normal

## 2021-06-23 NOTE — PATIENT INSTRUCTIONS
Kidney Stones   WHAT YOU NEED TO KNOW:   What is a kidney stone? Kidney stones form in the urinary system when the water and waste in your urine are out of balance  When this happens, certain types of waste crystals separate from the urine  The crystals build up and form kidney stones  Kidney stones can be made of uric acid, calcium, phosphate, or oxalate crystals  You may have more than one kidney stone  What increases my risk for kidney stones? · Not drinking enough liquids (especially water) each day    · Having urinary tract infections often    · Too much of certain foods, such as meat, salt, nuts, and chocolate    · Obesity    · Certain medicines, such as diuretics, steroids, and antacids    · A family history of kidney stones    · Being born with a kidney or bowel disorder    What are the signs and symptoms of kidney stones? · Pain in the middle of your back that moves across to your side or that may spread to your groin    · Nausea and vomiting    · Urge to urinate often, burning feeling when you urinate, or pink or red urine    · Tenderness in your lower back, side, or stomach    How are kidney stones diagnosed? Your healthcare provider will ask about your health and usual foods  He or she may refer you to a urologist  [place] may need tests to find out what type of kidney stones you have  Tests can show the size of your kidney stones and where they are in your urinary system  You may need more than one of the following:  · Urine tests  may show if you have blood in your urine  They may also show high amounts of the substances that form kidney stones, such as uric acid  · Blood tests  show how well your kidneys are working  They may also be used to check the levels of calcium or uric acid in your blood  · X-ray or ultrasound pictures  may be taken of your kidneys, bladder, and ureters  You may be given contrast liquid before an x-ray to help these show up better in the pictures   You may need to have more than one x-ray  Tell the healthcare provider if you have ever had an allergic reaction to contrast liquid  How are kidney stones treated? · NSAIDs , such as ibuprofen, help decrease swelling, pain, and fever  This medicine is available with or without a doctor's order  NSAIDs can cause stomach bleeding or kidney problems in certain people  If you take blood thinner medicine, always ask your healthcare provider if NSAIDs are safe for you  Always read the medicine label and follow directions  · Prescription pain medicine  may be given  Ask your healthcare provider how to take this medicine safely  Some prescription pain medicines contain acetaminophen  Do not take other medicines that contain acetaminophen without talking to your healthcare provider  Too much acetaminophen may cause liver damage  Prescription pain medicine may cause constipation  Ask your healthcare provider how to prevent or treat constipation  · Medicines  to balance your electrolytes may be needed  · A procedure or surgery  to remove the kidney stones may be needed if they do not pass on their own  Your treatment will depend on the size and location of your kidney stones  What can I do to manage kidney stones? · Drink more liquids  Your healthcare provider may tell you to drink at least 8 to 12 (eight-ounce) cups of liquids each day  This helps flush out the kidney stones when you urinate  Water is the best liquid to drink  · Strain your urine every time you go to the bathroom  Urinate through a strainer or a piece of thin cloth to catch the stones  Take the stones to your healthcare provider so they can be sent to the lab for tests  This will help your healthcare providers plan the best treatment for you  · Eat a variety of healthy foods  Healthy foods include fruits, vegetables, whole-grain breads, low-fat dairy products, beans, and fish  You may need to limit how much sodium (salt) or protein you eat  Ask for information about the best foods for you  · Stay active  Your stones may pass more easily if you stay active  Exercise can also help you manage your weight  Ask about the best activities for you  When should I seek immediate care? · You have vomiting that is not relieved by medicine  When should I contact my healthcare provider? · You have a fever  · You have trouble passing urine  · You see blood in your urine  · You have severe pain  · You have any questions or concerns about your condition or care  CARE AGREEMENT:   You have the right to help plan your care  Learn about your health condition and how it may be treated  Discuss treatment options with your healthcare providers to decide what care you want to receive  You always have the right to refuse treatment  The above information is an  only  It is not intended as medical advice for individual conditions or treatments  Talk to your doctor, nurse or pharmacist before following any medical regimen to see if it is safe and effective for you  © Copyright [address] Information is for End User's use only and may not be sold, redistributed or otherwise used for commercial purposes   All illustrations and images included in CareNotes® are the copyrighted property of A D A M , Inc  or [address] [place] [address]

## 2021-06-23 NOTE — H&P
Assessment/Plan:    Kidney stone  The patient underwent left stent placement for a left upper ureteral stone that migrated back into the kidney  We will proceed with definitive ureteroscopy on July 6  Procedure was discussed in detail  Risks were discussed and consent form signed  We will obtain a preoperative urine culture today  There are no diagnoses linked to this encounter  Subjective:      Patient ID: Otto Emery is a [de-identified] y o  male  Complaint:  Left ureteral stone    HPI:  44-year-old male underwent left stent placement on June 16th  He presents today to discuss definitive ureteroscopy  There is no fever or chills  He does have intermittent flank pain from the stent as well as gross hematuria  He is voiding adequately but does have urinary urgency and pain with urination  The following portions of the patient's history were reviewed and updated as appropriate: allergies, current medications, past family history, past medical history, past social history, past surgical history and problem list     Review of Systems   Constitutional: Negative for chills, diaphoresis, fatigue and fever  HENT: Negative  Eyes: Negative  Respiratory: Negative  Cardiovascular: Negative  Endocrine: Negative  Genitourinary: Positive for flank pain, hematuria and urgency  See HPI   Skin: Negative  Allergic/Immunologic: Negative  Neurological: Negative  Hematological: Negative  Psychiatric/Behavioral: Negative  Objective:      /82   Pulse 85   Ht 5' 7" (1 702 m)   Wt 91 6 kg (202 lb)   BMI 31 64 kg/m²          Physical Exam  Constitutional:       General: He is not in acute distress  Appearance: Normal appearance  He is well-developed  He is not ill-appearing, toxic-appearing or diaphoretic  HENT:      Head: Normocephalic and atraumatic  Eyes:      General: No scleral icterus       Conjunctiva/sclera: Conjunctivae normal    Cardiovascular: Rate and Rhythm: Normal rate and regular rhythm  Pulmonary:      Effort: Pulmonary effort is normal    Abdominal:      General: There is no distension  Palpations: There is no mass  Tenderness: There is no abdominal tenderness  There is no right CVA tenderness, left CVA tenderness, guarding or rebound  Hernia: No hernia is present  Comments: No hernia   Genitourinary:     Penis: Normal        Comments: Testes descended and normal to palpation  Musculoskeletal:      Cervical back: Normal range of motion and neck supple  Skin:     General: Skin is warm and dry  Neurological:      General: No focal deficit present  Mental Status: He is alert and oriented to person, place, and time  Psychiatric:         Mood and Affect: Mood normal          Behavior: Behavior normal          Thought Content:  Thought content normal          Judgment: Judgment normal

## 2021-06-23 NOTE — ASSESSMENT & PLAN NOTE
The patient underwent left stent placement for a left upper ureteral stone that migrated back into the kidney  We will proceed with definitive ureteroscopy on July 6  Procedure was discussed in detail  Risks were discussed and consent form signed  We will obtain a preoperative urine culture today

## 2021-06-24 LAB — BACTERIA UR CULT: NORMAL

## 2021-06-30 ENCOUNTER — ANESTHESIA EVENT (OUTPATIENT)
Dept: PERIOP | Facility: HOSPITAL | Age: 81
End: 2021-06-30
Payer: MEDICARE

## 2021-06-30 NOTE — PRE-PROCEDURE INSTRUCTIONS
Pre-Surgery Instructions:   Medication Instructions    acetaminophen (TYLENOL) 325 mg tablet Instructed patient per Anesthesia Guidelines   amLODIPine (NORVASC) 2 5 mg tablet Instructed patient per Anesthesia Guidelines   aspirin (ADULT ASPIRIN EC LOW STRENGTH) 81 mg EC tablet Patient was instructed by Physician and understands   CALCIUM CARBONATE-VITAMIN D PO Instructed patient per Anesthesia Guidelines   carvedilol (COREG) 6 25 mg tablet Instructed patient per Anesthesia Guidelines   escitalopram (LEXAPRO) 10 mg tablet Instructed patient per Anesthesia Guidelines   ibuprofen (MOTRIN) 200 mg tablet Instructed patient per Anesthesia Guidelines   levothyroxine 50 mcg tablet Instructed patient per Anesthesia Guidelines   meclizine (ANTIVERT) 25 mg tablet Instructed patient per Anesthesia Guidelines   meloxicam (MOBIC) 15 mg tablet Instructed patient per Anesthesia Guidelines   metFORMIN (GLUCOPHAGE) 500 mg tablet Instructed patient per Anesthesia Guidelines   omeprazole (PriLOSEC) 40 MG capsule Instructed patient per Anesthesia Guidelines   rosuvastatin (CRESTOR) 20 MG tablet Instructed patient per Anesthesia Guidelines   venlafaxine (EFFEXOR-XR) 75 mg 24 hr capsule Instructed patient per Anesthesia Guidelines  Patient's daughter  instructed pt to take*omeprazole,amlodipine,venlafaxine,escitalopram,and carvedilol*with a sip of water the morning of surgery  and instructed on use of chlorhexidine soap per hospital protocol    Patient instructed to stop all ASA, NSAIDS, vitamins and herbal supplements one week prior to surgery or per Dr Nikita Lara

## 2021-07-06 ENCOUNTER — TELEPHONE (OUTPATIENT)
Dept: UROLOGY | Facility: MEDICAL CENTER | Age: 81
End: 2021-07-06

## 2021-07-06 ENCOUNTER — ANESTHESIA (OUTPATIENT)
Dept: PERIOP | Facility: HOSPITAL | Age: 81
End: 2021-07-06
Payer: MEDICARE

## 2021-07-06 ENCOUNTER — APPOINTMENT (OUTPATIENT)
Dept: RADIOLOGY | Facility: HOSPITAL | Age: 81
End: 2021-07-06
Payer: MEDICARE

## 2021-07-06 ENCOUNTER — HOSPITAL ENCOUNTER (OUTPATIENT)
Facility: HOSPITAL | Age: 81
Setting detail: OUTPATIENT SURGERY
Discharge: HOME | End: 2021-07-06
Attending: UROLOGY | Admitting: UROLOGY
Payer: MEDICARE

## 2021-07-06 VITALS
TEMPERATURE: 97.2 F | OXYGEN SATURATION: 92 % | SYSTOLIC BLOOD PRESSURE: 155 MMHG | WEIGHT: 200 LBS | DIASTOLIC BLOOD PRESSURE: 79 MMHG | RESPIRATION RATE: 16 BRPM | HEART RATE: 77 BPM | BODY MASS INDEX: 33.32 KG/M2 | HEIGHT: 65 IN

## 2021-07-06 DIAGNOSIS — N20.1 URETERAL CALCULUS, LEFT: ICD-10-CM

## 2021-07-06 LAB — GLUCOSE SERPL-MCNC: 133 MG/DL (ref 65–140)

## 2021-07-06 PROCEDURE — 74420 UROGRAPHY RTRGR +-KUB: CPT

## 2021-07-06 PROCEDURE — 82948 REAGENT STRIP/BLOOD GLUCOSE: CPT

## 2021-07-06 PROCEDURE — 52356 CYSTO/URETERO W/LITHOTRIPSY: CPT | Performed by: UROLOGY

## 2021-07-06 PROCEDURE — C2617 STENT, NON-COR, TEM W/O DEL: HCPCS | Performed by: UROLOGY

## 2021-07-06 RX ORDER — ONDANSETRON 2 MG/ML
4 INJECTION INTRAMUSCULAR; INTRAVENOUS EVERY 6 HOURS PRN
Status: DISCONTINUED | OUTPATIENT
Start: 2021-07-06 | End: 2021-07-06 | Stop reason: HOSPADM

## 2021-07-06 RX ORDER — FENTANYL CITRATE 50 UG/ML
INJECTION, SOLUTION INTRAMUSCULAR; INTRAVENOUS AS NEEDED
Status: DISCONTINUED | OUTPATIENT
Start: 2021-07-06 | End: 2021-07-06

## 2021-07-06 RX ORDER — ACETAMINOPHEN 325 MG/1
650 TABLET ORAL EVERY 6 HOURS PRN
Status: DISCONTINUED | OUTPATIENT
Start: 2021-07-06 | End: 2021-07-06 | Stop reason: HOSPADM

## 2021-07-06 RX ORDER — SODIUM CHLORIDE, SODIUM LACTATE, POTASSIUM CHLORIDE, CALCIUM CHLORIDE 600; 310; 30; 20 MG/100ML; MG/100ML; MG/100ML; MG/100ML
125 INJECTION, SOLUTION INTRAVENOUS CONTINUOUS
Status: DISCONTINUED | OUTPATIENT
Start: 2021-07-06 | End: 2021-07-06 | Stop reason: HOSPADM

## 2021-07-06 RX ORDER — PROPOFOL 10 MG/ML
INJECTION, EMULSION INTRAVENOUS AS NEEDED
Status: DISCONTINUED | OUTPATIENT
Start: 2021-07-06 | End: 2021-07-06

## 2021-07-06 RX ORDER — CEFAZOLIN SODIUM 2 G/50ML
2000 SOLUTION INTRAVENOUS ONCE
Status: COMPLETED | OUTPATIENT
Start: 2021-07-06 | End: 2021-07-06

## 2021-07-06 RX ORDER — MAGNESIUM HYDROXIDE 1200 MG/15ML
LIQUID ORAL AS NEEDED
Status: DISCONTINUED | OUTPATIENT
Start: 2021-07-06 | End: 2021-07-06 | Stop reason: HOSPADM

## 2021-07-06 RX ORDER — ALBUTEROL SULFATE 2.5 MG/3ML
2.5 SOLUTION RESPIRATORY (INHALATION) ONCE AS NEEDED
Status: DISCONTINUED | OUTPATIENT
Start: 2021-07-06 | End: 2021-07-06 | Stop reason: HOSPADM

## 2021-07-06 RX ORDER — OXYCODONE HYDROCHLORIDE 5 MG/1
5 TABLET ORAL EVERY 4 HOURS PRN
Status: DISCONTINUED | OUTPATIENT
Start: 2021-07-06 | End: 2021-07-06 | Stop reason: HOSPADM

## 2021-07-06 RX ORDER — TAMSULOSIN HYDROCHLORIDE 0.4 MG/1
0.4 CAPSULE ORAL EVERY EVENING
Qty: 14 CAPSULE | Refills: 0 | Status: SHIPPED | OUTPATIENT
Start: 2021-07-06

## 2021-07-06 RX ORDER — SODIUM CHLORIDE, SODIUM LACTATE, POTASSIUM CHLORIDE, CALCIUM CHLORIDE 600; 310; 30; 20 MG/100ML; MG/100ML; MG/100ML; MG/100ML
20 INJECTION, SOLUTION INTRAVENOUS CONTINUOUS
Status: DISCONTINUED | OUTPATIENT
Start: 2021-07-06 | End: 2021-07-06 | Stop reason: HOSPADM

## 2021-07-06 RX ORDER — LIDOCAINE HYDROCHLORIDE 10 MG/ML
INJECTION, SOLUTION EPIDURAL; INFILTRATION; INTRACAUDAL; PERINEURAL AS NEEDED
Status: DISCONTINUED | OUTPATIENT
Start: 2021-07-06 | End: 2021-07-06

## 2021-07-06 RX ORDER — LIDOCAINE HYDROCHLORIDE 10 MG/ML
0.5 INJECTION, SOLUTION EPIDURAL; INFILTRATION; INTRACAUDAL; PERINEURAL ONCE AS NEEDED
Status: DISCONTINUED | OUTPATIENT
Start: 2021-07-06 | End: 2021-07-06 | Stop reason: HOSPADM

## 2021-07-06 RX ORDER — EPHEDRINE SULFATE 50 MG/ML
INJECTION INTRAVENOUS AS NEEDED
Status: DISCONTINUED | OUTPATIENT
Start: 2021-07-06 | End: 2021-07-06

## 2021-07-06 RX ORDER — FENTANYL CITRATE/PF 50 MCG/ML
25 SYRINGE (ML) INJECTION
Status: DISCONTINUED | OUTPATIENT
Start: 2021-07-06 | End: 2021-07-06 | Stop reason: HOSPADM

## 2021-07-06 RX ORDER — CEPHALEXIN 500 MG/1
500 CAPSULE ORAL EVERY 12 HOURS SCHEDULED
Qty: 10 CAPSULE | Refills: 0 | Status: SHIPPED | OUTPATIENT
Start: 2021-07-06 | End: 2021-07-11

## 2021-07-06 RX ORDER — ONDANSETRON 2 MG/ML
4 INJECTION INTRAMUSCULAR; INTRAVENOUS ONCE AS NEEDED
Status: DISCONTINUED | OUTPATIENT
Start: 2021-07-06 | End: 2021-07-06 | Stop reason: HOSPADM

## 2021-07-06 RX ADMIN — EPHEDRINE SULFATE 5 MG: 50 INJECTION, SOLUTION INTRAVENOUS at 12:17

## 2021-07-06 RX ADMIN — FENTANYL CITRATE 25 MCG: 50 INJECTION INTRAMUSCULAR; INTRAVENOUS at 12:04

## 2021-07-06 RX ADMIN — CEFAZOLIN SODIUM 2000 MG: 2 SOLUTION INTRAVENOUS at 12:05

## 2021-07-06 RX ADMIN — SODIUM CHLORIDE, SODIUM LACTATE, POTASSIUM CHLORIDE, AND CALCIUM CHLORIDE 125 ML/HR: .6; .31; .03; .02 INJECTION, SOLUTION INTRAVENOUS at 09:44

## 2021-07-06 RX ADMIN — FENTANYL CITRATE 50 MCG: 50 INJECTION INTRAMUSCULAR; INTRAVENOUS at 12:50

## 2021-07-06 RX ADMIN — LIDOCAINE HYDROCHLORIDE 50 MG: 10 INJECTION, SOLUTION EPIDURAL; INFILTRATION; INTRACAUDAL; PERINEURAL at 12:03

## 2021-07-06 RX ADMIN — PROPOFOL 20 MG: 10 INJECTION, EMULSION INTRAVENOUS at 12:22

## 2021-07-06 RX ADMIN — FENTANYL CITRATE 25 MCG: 50 INJECTION INTRAMUSCULAR; INTRAVENOUS at 12:25

## 2021-07-06 RX ADMIN — EPHEDRINE SULFATE 5 MG: 50 INJECTION, SOLUTION INTRAVENOUS at 12:14

## 2021-07-06 RX ADMIN — PROPOFOL 130 MG: 10 INJECTION, EMULSION INTRAVENOUS at 12:03

## 2021-07-06 RX ADMIN — EPHEDRINE SULFATE 5 MG: 50 INJECTION, SOLUTION INTRAVENOUS at 12:20

## 2021-07-06 NOTE — INTERVAL H&P NOTE
H&P reviewed  After examining the patient I find no changes in the patients condition since the H&P had been written  Vitals:    07/06/21 0930   BP: 141/90   Pulse: 81   Resp: 18   Temp: 97 6 °F (36 4 °C)   SpO2: 95%    Urine culture is negative  He denies fever, chills or significant flank pain  Procedure reviewed  Risks were discussed  Laterality marked-left

## 2021-07-06 NOTE — ANESTHESIA PREPROCEDURE EVALUATION
Procedure:  CYSTOSCOPY URETEROSCOPY WITH LITHOTRIPSY HOLMIUM LASER, RETROGRADE PYELOGRAM AND INSERTION STENT URETERAL (Left Ureter)    Relevant Problems   CARDIO   (+) CAD (coronary artery disease)   (+) CABRERA (dyspnea on exertion)   (+) Essential hypertension   (+) Mixed hyperlipidemia      ENDO   (+) Acquired hypothyroidism      /RENAL   (+) Hydronephrosis of left kidney   (+) Kidney stone      NEURO/PSYCH   (+) History of phimosis   (+) History of prostate cancer      PULMONARY   (+) CABRERA (dyspnea on exertion)      Recent labs personally reviewed:  Lab Results   Component Value Date    WBC 5 81 06/15/2021    HGB 11 7 (L) 06/15/2021     (L) 06/15/2021     Lab Results   Component Value Date    K 3 7 06/15/2021    BUN 20 06/15/2021    CREATININE 1 33 (H) 06/15/2021     Lab Results   Component Value Date    PTT 25 06/14/2021      Lab Results   Component Value Date    INR 1 03 06/14/2021     Lab Results   Component Value Date    HGBA1C 6 4 (H) 05/30/2020     TTE 2020:    Left Ventricle  Left ventricle is normal in size  There is mild   concentric hypertrophy  Systolic function is mildly decreased with an   ejection fraction of 45-50%  Global hypokinesia  There is grade I (mild)   diastolic dysfunction    Mitral Valve  The leaflets are mildly thickened  There is mild   posterior annular calcification  There is moderate regurgitation with a   centrally directed jet    Aorta  The ascending aorta is mildly dilated  4 0 cm      Physical Exam    Airway    Mallampati score: II  TM Distance: >3 FB  Neck ROM: full     Dental       Cardiovascular  Rhythm: regular, Rate: normal,     Pulmonary  Breath sounds clear to auscultation,     Other Findings        Anesthesia Plan  ASA Score- 3     Anesthesia Type- general with ASA Monitors  Additional Monitors:   Airway Plan: LMA  Plan Factors-Exercise tolerance (METS): <4 METS  Chart reviewed  EKG reviewed  Existing labs reviewed   Patient summary reviewed  Induction- intravenous  Postoperative Plan-   Planned trial extubation    Informed Consent- Anesthetic plan and risks discussed with patient  I personally reviewed this patient with the CRNA  Discussed and agreed on the Anesthesia Plan with the CRNA  Sarah Fletcher

## 2021-07-06 NOTE — DISCHARGE INSTRUCTIONS
Ureteroscopy   WHAT YOU NEED TO KNOW:   A ureteroscopy is a procedure to examine in the inside of your urinary tract, which includes your urethra, bladder, ureters, and kidneys  A ureteroscope is a small, thin tube with a light and camera on the end  Ureteroscopy can help your healthcare provider diagnose and treat problems in your urinary tract, such as kidney stones  DISCHARGE INSTRUCTIONS:   Medicine:   · Antibiotics  may be given to treat or prevent an infection  · Take your medicine as directed  Contact your healthcare provider if you think your medicine is not helping or if you have side effects  Tell him or her if you are allergic to any medicine  Keep a list of the medicines, vitamins, and herbs you take  Include the amounts, and when and why you take them  Bring the list or the pill bottles to follow-up visits  Carry your medicine list with you in case of an emergency  Follow up with your healthcare provider as directed:  Write down your questions so you remember to ask them during your visits  Drink liquids as directed  Liquids can help prevent kidney stones and urinary tract infections  Drink water and limit the amount of caffeine you drink  Caffeine may be found in coffee, tea, soda, sports drinks, and foods  Ask your healthcare provider how much liquid to drink each day  Contact your healthcare provider if:   · You have a fever  · You cannot urinate  · You have blood clots in your urine that interfere with urination  · You are vomiting  · You have pain in your abdomen or side  · You have questions or concerns about your condition or care  © Copyright [employer] 2018 Information is for End User's use only and may not be sold, redistributed or otherwise used for commercial purposes  All illustrations and images included in CareNotes® are the copyrighted property of [employer]  or [facility] Minh Doherty  The above information is an  only   It is not intended as medical advice for individual conditions or treatments  Talk to your doctor, nurse or pharmacist before following any medical regimen to see if it is safe and effective for you

## 2021-07-06 NOTE — OP NOTE
OPERATIVE REPORT  PATIENT NAME: Jairo Kemp    :  1940  MRN: [id number]  Pt Location: [facility] CYSTO ROOM 01    SURGERY DATE: 2021    Surgeon(s) and Role:     * Yousuf Agarwal MD - Primary    Preop Diagnosis:  Ureteral calculus, left [N20 1]    Post-Op Diagnosis Codes:     * Kidney stone on left side [N20 0]    Procedure(s) (LRB):  CYSTOSCOPY URETEROSCOPY WITH LITHOTRIPSY HOLMIUM LASER, RETROGRADE PYELOGRAM AND INSERTION STENT URETERAL (Left)    Specimen(s):  * No specimens in log *    Estimated Blood Loss:   Minimal    Drains:  * No LDAs found *    Anesthesia Type:   General    Operative Indications:  Ureteral calculus, left [N20 1]      Operative Findings:  Normal urethra with open prostatic fossa  Stent placed earlier was in good position  The bladder was unremarkable  On preliminary fluoroscopic images the stone was identified in the area the lower pole of the left kidney  This was confirmed ureteroscopically  The stone was dusted with the holmium laser  All fragments appeared passable  A stent was replaced with Dangler taped to the penis  Complications:   None    Procedure and Technique:  PLAN FOR STENT:  Removal by nursing staff 5 days      The patient was brought into the OR, properly identified and positioned on the table  General anesthesia was administered and the patient was placed in lithotomy position and prepped and draped in the usual sterile fashion  Compression boots were employed  Intravenous antibiotic was administered  An appropriate time-out was performed  Cystoscopy was performed with a 25 Ukrainian cystoscope with findings as above  The left ureteral orifice was identified  The stent was grasped with a grasper and externalized  A guidewire was passed up the stent and into the kidney under fluoroscopic guidance  Stent was removed  A 12-14 Ukrainian ureteral access sheath was then placed over the guidewire easily up into the upper ureter     The flexible scope was placed thru a sheath and advanced to stone  in the lower pole the left kidney    The Holmium laser was used on various settings to break up stone into tiny particles  Care was taken not to laser tissue  All fragments appeared small enough to pass around the stent  Contrast was then injected through the scope to complete a retrograde  Systematic examination of the collecting system was then performed and no additional stones identified  The ureteroscope  was removed from the ureter in conjunction with the access sheath  No damage was noted to the ureter nor were there any ureteral stones  The cystoscope was used to place a 6F Contour VL stent in the ureter, with the upper coils in the renal pelvis, and the distal coil in the bladder  The patient was awaken from anesthesia and taken to the PACU in good condition     I was present for the entire procedure    Patient Disposition:  PACU     SIGNATURE: Milagros Adams MD  DATE: July 6, 2021  TIME: 1:13 PM

## 2021-07-06 NOTE — TELEPHONE ENCOUNTER
Patient had ureteroscopy today  Stent is on a string  The stent can be removed Monday or Tuesday of next week  The patient will then need a follow-up with the AP in approximately 6 weeks  He should have a renal ultrasound prior to that visit

## 2021-07-06 NOTE — ANESTHESIA POSTPROCEDURE EVALUATION
Post-Op Assessment Note    CV Status:  Stable  Pain Score: 0    Pain management: adequate     Mental Status:  Sleepy   Hydration Status:  Stable   PONV Controlled:  Controlled   Airway Patency:  Patent      Post Op Vitals Reviewed: Yes      Staff: Anesthesiologist, CRNA         No complications documented      BP      Temp      Pulse     Resp      SpO2

## 2021-07-07 NOTE — TELEPHONE ENCOUNTER
Post Op Note    Gil Edmonds is a [de-identified] y o  male s/p CYSTOSCOPY URETEROSCOPY WITH LITHOTRIPSY HOLMIUM LASER, RETROGRADE PYELOGRAM AND INSERTION STENT URETERAL (Left Ureter) performed 7-6-2021  Gil Edmonds is a patient of Dr Basim Bañuelos and is seen at the [facility] office  How would you rate your pain on a scale from 1 to 10, 10 being the worst pain ever? 1  Have you had a fever? No  Do you have any difficulty urinating? No     Do you have any other questions or concerns that I can address at this time? Pt states that he is recovering well since procedure yesterday  He is experiencing mild burning with urination and does have occasional bleeding off and on when he urinates  Informed the patient that these symptoms are to be expected while having a stent in place  Advised patient that is his symptoms worsen, he is to contact the office for assistance or proceed to the nearest ER based of severity of symptoms  Pt verbalized his understandings of these recommendations   Pt is scheduled with the nurse on 7- for stent with string removal

## 2021-07-08 ENCOUNTER — TELEPHONE (OUTPATIENT)
Dept: UROLOGY | Facility: MEDICAL CENTER | Age: 81
End: 2021-07-08

## 2021-07-08 DIAGNOSIS — N20.0 KIDNEY STONE: Primary | ICD-10-CM

## 2021-07-08 RX ORDER — OXYBUTYNIN CHLORIDE 5 MG/1
5 TABLET ORAL 3 TIMES DAILY PRN
Qty: 30 TABLET | Refills: 0 | Status: SHIPPED | OUTPATIENT
Start: 2021-07-08

## 2021-07-08 RX ORDER — PHENAZOPYRIDINE HYDROCHLORIDE 200 MG/1
200 TABLET, FILM COATED ORAL
Qty: 10 TABLET | Refills: 0 | Status: SHIPPED | OUTPATIENT
Start: 2021-07-08

## 2021-07-08 NOTE — TELEPHONE ENCOUNTER
Patient daughter Марина Echevarria called to office the stent came out and was hanging out of his penis abut 5 inches  Instructed patient to remove the stent  Reviewed recommendation from provider  Will contact provider for clarification with    Medication order and to let provider know that stent did become displaced and patient removed

## 2021-07-08 NOTE — TELEPHONE ENCOUNTER
Patient daughter calling to advise pain level 10  Patient is currently taking antibiotics and tylenol  Transferred to call center nurse

## 2021-07-08 NOTE — TELEPHONE ENCOUNTER
If the stent is now out he no longer needs the oxybutynin or flomax   Can use pyridium prn if still burning

## 2021-07-08 NOTE — TELEPHONE ENCOUNTER
Returned call to patient daughter advised providers recommendation to discontinue the oxybutynin and flomax  Monitor for changes in urine pattern and pain levels  Ed precautions advised  Appointment for Monday for stent removal will be cancelled     Follow up appt for 6 weeks scheduled   8/11/12 in [facility]

## 2021-07-08 NOTE — TELEPHONE ENCOUNTER
Returning a call to Patients daughter Michelle See) patient of Dr Martina Price  Seen in our  [facility] [place] office  Diag:Kidney stone on left side [N20 0]  S/p : 7/6/21CYSTOSCOPY URETEROSCOPY WITH LITHOTRIPSY HOLMIUM LASER, RETROGRADE PYELOGRAM AND INSERTION STENT URETERAL   Symptoms as verbalized by patient  Having  Burning every time he urinates   Patients daughter states that the pain is 10/10  Due to incontinence he is unable to do ADL's because of the pain  Patients daughter states that he is taking tylenol 650mg 4-6 hours and it is not helping  Patients daughter is requesting something to help with pain  Pain: burning with urination  Nausea/Vomiting: none  Fever/Chills: none  Bowel Movements: not sure  Is taking ColaceUrine color: Yellow   Frequency/Urgencey : yes  Stream: incontinent  Medication: Keflex 500 mg / Flomax 0 4mg as prescribed  reviewed post stent instructions what to expect instructions with patient and daughter  Encouraged hydration 40-60oz of water daily  Avoiding Bladder irritants such as coffee,tea,soda  Limiting your alcohol intake  Avoiding constipation  Reducing cigarette smoking  Requested patient contact our office with fever above 101 0, chills, decreased urination or unable to urinate, blood or clots in the Urine  Ed precautions reviewed   Patient verbilized understanding     Will send encounter to provider for further recommendations

## 2021-07-09 NOTE — TELEPHONE ENCOUNTER
Returned call to patient daughter Miguel Adame  Patient is doing well this morning  Voiding without difficulty  Appt was given for 6 week 8/11/212 11:30 am in [facility]  Advised patient to continue monitoring urine  Ed precautions reviewed  Patient verbalized understanding

## 2021-08-12 ENCOUNTER — OFFICE VISIT (OUTPATIENT)
Dept: UROLOGY | Facility: CLINIC | Age: 81
End: 2021-08-12
Payer: MEDICARE

## 2021-08-12 VITALS
HEIGHT: 65 IN | SYSTOLIC BLOOD PRESSURE: 120 MMHG | WEIGHT: 193 LBS | BODY MASS INDEX: 32.15 KG/M2 | DIASTOLIC BLOOD PRESSURE: 72 MMHG

## 2021-08-12 DIAGNOSIS — C61 PROSTATE CANCER (HCC): Primary | ICD-10-CM

## 2021-08-12 PROCEDURE — 99214 OFFICE O/P EST MOD 30 MIN: CPT | Performed by: PHYSICIAN ASSISTANT

## 2021-08-12 NOTE — PROGRESS NOTES
8/12/2021      Chief Complaint   Patient presents with    Nephrolithiasis         Assessment and Plan    [de-identified] y o  male managed by Dr Juany Cole    1  Prostate Cancer  - s/p IMRT 2012  - last PSA <0 1 Jan 2020  - updated PSA this month    2  Ureterolithiasis  - s/p LEFT urs/laser/stent July 2021  - stent removed postop  - doing well, no symptoms  - update US k/b this month    US and PSA this month, will call with results  If doing well f/u 1 yr  History of Present Illness  Walker Marinelli is a [de-identified] y o  male here for evaluation of six week postop visit, underwent LEFT ureteroscopy with laser lithotripsy of proximal ureteral calculus on July 6 after stenting performed earlier in June  He had significant incontinence with stent in place, it subsequently fell out and symptoms resolved  He's felt well the past month on no  meds  Has prostate cancer status post IMRT 2012 with undetectable PSA and no radiation sequelae  Needs updated PSA  Review of Systems   Constitutional: Negative for activity change, appetite change, chills, fever and unexpected weight change  HENT: Negative  Respiratory: Negative  Negative for shortness of breath  Cardiovascular: Negative  Negative for chest pain  Gastrointestinal: Negative for abdominal pain, diarrhea, nausea and vomiting  Endocrine: Negative  Genitourinary: Negative for decreased urine volume, difficulty urinating, dysuria, flank pain, frequency, hematuria and urgency  Musculoskeletal: Negative for back pain and gait problem  Skin: Negative  Allergic/Immunologic: Negative  Neurological: Negative  Hematological: Negative for adenopathy  Does not bruise/bleed easily  AUA SYMPTOM SCORE      Most Recent Value   AUA SYMPTOM SCORE   How often have you had a sensation of not emptying your bladder completely after you finished urinating? 0   How often have you had to urinate again less than two hours after you finished urinating? 0   How often have you found you stopped and started again several times when you urinate?  0   How often have you found it difficult to postpone urination? 2   How often have you had a weak urinary stream?  0   How often have you had to push or strain to begin urination? 0   How many times did you most typically get up to urinate from the time you went to bed at night until the time you got up in the morning? 2   Quality of Life: If you were to spend the rest of your life with your urinary condition just the way it is now, how would you feel about that?  2   AUA SYMPTOM SCORE  4           Vitals  Vitals:    08/12/21 0958   BP: 120/72   Weight: 87 5 kg (193 lb)   Height: 5' 5" (1 651 m)       Physical Exam  Vitals and nursing note reviewed  Constitutional:       General: He is not in acute distress  Appearance: Normal appearance  He is well-developed  He is not diaphoretic  HENT:      Head: Normocephalic and atraumatic  Pulmonary:      Effort: Pulmonary effort is normal       Comments: No cough or audible wheeze  Abdominal:      General: There is no distension  Tenderness: There is no abdominal tenderness  There is no right CVA tenderness or left CVA tenderness  Musculoskeletal:      Right lower leg: No edema  Left lower leg: No edema  Skin:     General: Skin is warm and dry  Neurological:      Mental Status: He is alert and oriented to person, place, and time        Gait: Gait normal    Psychiatric:         Speech: Speech normal          Behavior: Behavior normal            Past History  Past Medical History:   Diagnosis Date    Anesthesia     "once in awhile takes awhile to wake up:    Anxiety     Arthritis     Balance problems     Blood in urine     Cancer (HCC)     Cardiac disease     Chronic pain disorder     arthritis    Coronary artery disease     CPAP (continuous positive airway pressure) dependence     Depression     Dry mouth     Dry skin     "scaly feet"    Full dentures     GERD (gastroesophageal reflux disease)     History of coronary artery stent placement     x2, "approx 20 yrs ago or more"    History of radiation therapy     "20 treatments for prostate" "approx 10yrs ago"    [place] (hard of hearing)     Hyperlipidemia     Hypertension     Kidney stone     Neck pain     Prostate CA ([address] )     Risk for falls     Shortness of breath     "gets winded with walking from car to house"'not new but as getting older"    Sleep apnea     Swelling of ankle     occas both ankles    Urinary frequency     Urinary urgency     Vertigo     Wears glasses     Wears hearing aid in both ears      Social History     Socioeconomic History    Marital status: /Civil Union     Spouse name: None    Number of children: None    Years of education: None    Highest education level: None   Occupational History    None   Tobacco Use    Smoking status: Current Some Day Smoker     Years: 57 00     Types: Cigars    Smokeless tobacco: Never Used    Tobacco comment: declined   Substance and Sexual Activity    Alcohol use: Yes     Alcohol/week: 1 0 standard drinks     Types: 1 Cans of beer per week     Comment: occasionally    Drug use: No    Sexual activity: None     Comment: defer   Other Topics Concern    None   Social History Narrative    None     Social Determinants of Health     Financial Resource Strain:     Difficulty of Paying Living Expenses:    Food Insecurity:     Worried About Running Out of Food in the Last Year:     Ran Out of Food in the Last Year:    Transportation Needs:     Lack of Transportation (Medical):      Lack of Transportation (Non-Medical):    Physical Activity:     Days of Exercise per Week:     Minutes of Exercise per Session:    Stress:     Feeling of Stress :    Social Connections:     Frequency of Communication with Friends and Family:     Frequency of Social Gatherings with Friends and Family:     Attends Hoahaoism Services:     Active Member of Clubs or Organizations:     Attends Club or Organization Meetings:     Marital Status:    Intimate Partner Violence:     Fear of Current or Ex-Partner:     Emotionally Abused:     Physically Abused:     Sexually Abused:      Social History     Tobacco Use   Smoking Status Current Some Day Smoker    Years: 57 00    Types: Cigars   Smokeless Tobacco Never Used   Tobacco Comment    declined     History reviewed  No pertinent family history      The following portions of the patient's history were reviewed and updated as appropriate: allergies, current medications, past medical history, past social history, past surgical history and problem list     Results  No results found for this or any previous visit (from the past 1 hour(s)) ]  Lab Results   Component Value Date    PSA <0 1 05/21/2019     Lab Results   Component Value Date    CALCIUM 8 3 06/15/2021    K 3 7 06/15/2021    CO2 28 06/15/2021     06/15/2021    BUN 20 06/15/2021    CREATININE 1 33 (H) 06/15/2021     Lab Results   Component Value Date    WBC 5 81 06/15/2021    HGB 11 7 (L) 06/15/2021    HCT 35 5 (L) 06/15/2021    MCV 99 (H) 06/15/2021     (L) 06/15/2021

## 2021-09-01 ENCOUNTER — HOSPITAL ENCOUNTER (OUTPATIENT)
Dept: RADIOLOGY | Facility: HOSPITAL | Age: 81
Discharge: HOME | End: 2021-09-01
Attending: UROLOGY
Payer: MEDICARE

## 2021-09-01 DIAGNOSIS — N20.1 URETERAL CALCULUS, LEFT: ICD-10-CM

## 2021-09-01 PROCEDURE — 76770 US EXAM ABDO BACK WALL COMP: CPT

## 2021-09-09 ENCOUNTER — TELEPHONE (OUTPATIENT)
Dept: UROLOGY | Facility: MEDICAL CENTER | Age: 81
End: 2021-09-09

## 2021-09-09 NOTE — TELEPHONE ENCOUNTER
----- Message from Juliana Browne MD sent at 9/9/2021 11:15 AM EDT -----  Inform pt that the  test was normal   Small renal cyst is normal for age  Keep usual follow up appt

## 2021-09-09 NOTE — RESULT ENCOUNTER NOTE
Inform pt that the  test was normal   Small renal cyst is normal for age  Keep usual follow up appt

## 2023-01-25 NOTE — TELEPHONE ENCOUNTER
Pt wife c/o pt has extreme frequency since stent removal   Sched appt with DK tomorrow Azathioprine Pregnancy And Lactation Text: This medication is Pregnancy Category D and isn't considered safe during pregnancy. It is unknown if this medication is excreted in breast milk.

## 2025-08-04 ENCOUNTER — TELEPHONE (OUTPATIENT)
Age: 85
End: 2025-08-04

## 2025-08-05 ENCOUNTER — NURSING HOME VISIT (OUTPATIENT)
Dept: GERIATRICS | Facility: OTHER | Age: 85
End: 2025-08-05
Payer: MEDICARE

## 2025-08-05 DIAGNOSIS — I48.0 PAROXYSMAL ATRIAL FIBRILLATION (HCC): ICD-10-CM

## 2025-08-05 DIAGNOSIS — G30.9 ALZHEIMER'S DEMENTIA WITHOUT BEHAVIORAL DISTURBANCE, PSYCHOTIC DISTURBANCE, MOOD DISTURBANCE, OR ANXIETY, UNSPECIFIED DEMENTIA SEVERITY, UNSPECIFIED TIMING OF DEMENTIA ONSET (HCC): ICD-10-CM

## 2025-08-05 DIAGNOSIS — I10 ESSENTIAL HYPERTENSION: Primary | ICD-10-CM

## 2025-08-05 DIAGNOSIS — M25.551 HIP PAIN, ACUTE, RIGHT: ICD-10-CM

## 2025-08-05 DIAGNOSIS — E78.2 MIXED HYPERLIPIDEMIA: ICD-10-CM

## 2025-08-05 DIAGNOSIS — I25.10 CORONARY ARTERY DISEASE INVOLVING NATIVE CORONARY ARTERY OF NATIVE HEART WITHOUT ANGINA PECTORIS: ICD-10-CM

## 2025-08-05 DIAGNOSIS — R29.6 FALLS: ICD-10-CM

## 2025-08-05 DIAGNOSIS — F32.89 OTHER DEPRESSION: ICD-10-CM

## 2025-08-05 DIAGNOSIS — Z85.46 HISTORY OF PROSTATE CANCER: ICD-10-CM

## 2025-08-05 DIAGNOSIS — F02.80 ALZHEIMER'S DEMENTIA WITHOUT BEHAVIORAL DISTURBANCE, PSYCHOTIC DISTURBANCE, MOOD DISTURBANCE, OR ANXIETY, UNSPECIFIED DEMENTIA SEVERITY, UNSPECIFIED TIMING OF DEMENTIA ONSET (HCC): ICD-10-CM

## 2025-08-05 DIAGNOSIS — R26.2 AMBULATORY DYSFUNCTION: ICD-10-CM

## 2025-08-05 DIAGNOSIS — N20.0 KIDNEY STONE: ICD-10-CM

## 2025-08-05 DIAGNOSIS — G47.33 OSA (OBSTRUCTIVE SLEEP APNEA): ICD-10-CM

## 2025-08-05 DIAGNOSIS — I50.42 CHRONIC COMBINED SYSTOLIC (CONGESTIVE) AND DIASTOLIC (CONGESTIVE) HEART FAILURE (HCC): ICD-10-CM

## 2025-08-05 DIAGNOSIS — E11.9 TYPE 2 DIABETES MELLITUS WITHOUT COMPLICATION, UNSPECIFIED WHETHER LONG TERM INSULIN USE (HCC): ICD-10-CM

## 2025-08-05 PROBLEM — F03.90 UNSPECIFIED DEMENTIA, UNSPECIFIED SEVERITY, WITHOUT BEHAVIORAL DISTURBANCE, PSYCHOTIC DISTURBANCE, MOOD DISTURBANCE, AND ANXIETY (HCC): Status: ACTIVE | Noted: 2025-08-05

## 2025-08-05 PROBLEM — F32.A DEPRESSION: Status: ACTIVE | Noted: 2025-08-05

## 2025-08-05 PROBLEM — G20.A1: Status: ACTIVE | Noted: 2025-08-05

## 2025-08-05 PROCEDURE — 99306 1ST NF CARE HIGH MDM 50: CPT | Performed by: FAMILY MEDICINE

## 2025-08-07 ENCOUNTER — NURSING HOME VISIT (OUTPATIENT)
Dept: GERIATRICS | Facility: OTHER | Age: 85
End: 2025-08-07
Payer: MEDICARE

## 2025-08-07 VITALS
TEMPERATURE: 97.5 F | WEIGHT: 207 LBS | DIASTOLIC BLOOD PRESSURE: 67 MMHG | OXYGEN SATURATION: 96 % | SYSTOLIC BLOOD PRESSURE: 105 MMHG | RESPIRATION RATE: 18 BRPM | HEART RATE: 87 BPM | BODY MASS INDEX: 34.45 KG/M2

## 2025-08-07 DIAGNOSIS — I48.0 PAROXYSMAL ATRIAL FIBRILLATION (HCC): ICD-10-CM

## 2025-08-07 DIAGNOSIS — E11.9 TYPE 2 DIABETES MELLITUS WITHOUT COMPLICATION, WITHOUT LONG-TERM CURRENT USE OF INSULIN (HCC): ICD-10-CM

## 2025-08-07 DIAGNOSIS — I10 ESSENTIAL HYPERTENSION: ICD-10-CM

## 2025-08-07 DIAGNOSIS — I25.10 CORONARY ARTERY DISEASE INVOLVING NATIVE HEART WITHOUT ANGINA PECTORIS, UNSPECIFIED VESSEL OR LESION TYPE: Primary | ICD-10-CM

## 2025-08-07 DIAGNOSIS — F03.A0 MILD DEMENTIA WITHOUT BEHAVIORAL DISTURBANCE, PSYCHOTIC DISTURBANCE, MOOD DISTURBANCE, OR ANXIETY, UNSPECIFIED DEMENTIA TYPE (HCC): ICD-10-CM

## 2025-08-07 DIAGNOSIS — I50.42 CHRONIC COMBINED SYSTOLIC (CONGESTIVE) AND DIASTOLIC (CONGESTIVE) HEART FAILURE (HCC): ICD-10-CM

## 2025-08-07 DIAGNOSIS — R26.2 AMBULATORY DYSFUNCTION: ICD-10-CM

## 2025-08-07 DIAGNOSIS — F32.A DEPRESSION, UNSPECIFIED DEPRESSION TYPE: ICD-10-CM

## 2025-08-07 PROBLEM — N20.0 KIDNEY STONE: Status: RESOLVED | Noted: 2017-12-24 | Resolved: 2025-08-07

## 2025-08-07 PROBLEM — N13.30 HYDRONEPHROSIS OF LEFT KIDNEY: Status: RESOLVED | Noted: 2021-06-14 | Resolved: 2025-08-07

## 2025-08-07 PROCEDURE — 99309 SBSQ NF CARE MODERATE MDM 30: CPT | Performed by: NURSE PRACTITIONER

## 2025-08-11 ENCOUNTER — NURSING HOME VISIT (OUTPATIENT)
Dept: GERIATRICS | Facility: OTHER | Age: 85
End: 2025-08-11
Payer: MEDICARE

## 2025-08-11 PROBLEM — N18.31 STAGE 3A CHRONIC KIDNEY DISEASE (HCC): Status: ACTIVE | Noted: 2025-08-11

## 2025-08-13 ENCOUNTER — NURSING HOME VISIT (OUTPATIENT)
Dept: GERIATRICS | Facility: OTHER | Age: 85
End: 2025-08-13
Payer: MEDICARE

## 2025-08-20 ENCOUNTER — NURSING HOME VISIT (OUTPATIENT)
Age: 85
End: 2025-08-20
Payer: MEDICARE

## 2025-08-20 VITALS
DIASTOLIC BLOOD PRESSURE: 62 MMHG | SYSTOLIC BLOOD PRESSURE: 98 MMHG | RESPIRATION RATE: 16 BRPM | OXYGEN SATURATION: 97 % | HEART RATE: 80 BPM | BODY MASS INDEX: 34.46 KG/M2 | WEIGHT: 207.1 LBS

## 2025-08-20 DIAGNOSIS — I50.42 CHRONIC COMBINED SYSTOLIC (CONGESTIVE) AND DIASTOLIC (CONGESTIVE) HEART FAILURE (HCC): ICD-10-CM

## 2025-08-20 DIAGNOSIS — R26.2 AMBULATORY DYSFUNCTION: ICD-10-CM

## 2025-08-20 DIAGNOSIS — F02.A0 MILD DEMENTIA ASSOCIATED WITH OTHER UNDERLYING DISEASE, WITHOUT BEHAVIORAL DISTURBANCE, PSYCHOTIC DISTURBANCE, MOOD DISTURBANCE, OR ANXIETY (HCC): ICD-10-CM

## 2025-08-20 DIAGNOSIS — F32.0 CURRENT MILD EPISODE OF MAJOR DEPRESSIVE DISORDER WITHOUT PRIOR EPISODE (HCC): ICD-10-CM

## 2025-08-20 DIAGNOSIS — E11.9 TYPE 2 DIABETES MELLITUS WITHOUT COMPLICATION, WITHOUT LONG-TERM CURRENT USE OF INSULIN (HCC): ICD-10-CM

## 2025-08-20 DIAGNOSIS — I48.0 PAROXYSMAL ATRIAL FIBRILLATION (HCC): Primary | ICD-10-CM

## 2025-08-20 DIAGNOSIS — I10 ESSENTIAL HYPERTENSION: ICD-10-CM

## 2025-08-20 DIAGNOSIS — N18.31 STAGE 3A CHRONIC KIDNEY DISEASE (HCC): ICD-10-CM

## 2025-08-20 PROCEDURE — 99316 NF DSCHRG MGMT 30 MIN+: CPT

## 2025-08-20 RX ORDER — ESCITALOPRAM OXALATE 20 MG/1
20 TABLET ORAL DAILY
Qty: 90 TABLET | Refills: 0 | Status: SHIPPED | OUTPATIENT
Start: 2025-08-20

## (undated) DEVICE — BASKET SPECIMEN RETRIVAL 1.9FR 120CM

## (undated) DEVICE — SHEATH URETERAL ACCESS 10/12FR 45CM PROXIS

## (undated) DEVICE — GLOVE SRG BIOGEL 7.5

## (undated) DEVICE — INVIEW CLEAR LEGGINGS: Brand: CONVERTORS

## (undated) DEVICE — GLOVE SRG BIOGEL 7

## (undated) DEVICE — SCD SEQUENTIAL COMPRESSION COMFORT SLEEVE MEDIUM KNEE LENGTH: Brand: KENDALL SCD

## (undated) DEVICE — PACK TUR

## (undated) DEVICE — SHEATH URETERAL ACCESS 12/14FR 35CM PROXIS

## (undated) DEVICE — LASER HOLMIUM FIBER 365 MIC

## (undated) DEVICE — TUBING SUCTION 5MM X 12 FT

## (undated) DEVICE — LUBRICANT SURGILUBE TUBE 4 OZ  FLIP TOP

## (undated) DEVICE — SUT CHROMIC 3-0 PS-2 27 1638H

## (undated) DEVICE — SYRINGE 10ML LL

## (undated) DEVICE — INTENDED FOR TISSUE SEPARATION, AND OTHER PROCEDURES THAT REQUIRE A SHARP SURGICAL BLADE TO PUNCTURE OR CUT.: Brand: BARD-PARKER SAFETY BLADES SIZE 15, STERILE

## (undated) DEVICE — LASER FIBER HOLMIUM 272MICRON

## (undated) DEVICE — GUIDEWIRE STRGHT TIP 0.035 IN  SOLO PLUS

## (undated) DEVICE — SPECIMEN CONTAINER STERILE PEEL PACK

## (undated) DEVICE — SYRINGE 20ML LL

## (undated) DEVICE — CATH URETERAL 5FR X 70 CM FLEX TIP POLYUR BARD

## (undated) DEVICE — BASIC SINGLE BASIN-LF: Brand: MEDLINE INDUSTRIES, INC.

## (undated) DEVICE — COTTON TIP APPLICTOR 2 PK

## (undated) DEVICE — UROCATCH BAG

## (undated) DEVICE — GAUZE SPONGES,16 PLY: Brand: CURITY

## (undated) DEVICE — CATH URET .038 10FR 50CM DUAL LUMEN

## (undated) DEVICE — PENCIL ELECTROSURG E-Z CLEAN -0035H

## (undated) DEVICE — CAUTERY TIP POLISHER: Brand: DEVON

## (undated) DEVICE — BETHLEHEM UNIVERSAL MINOR GEN: Brand: CARDINAL HEALTH

## (undated) DEVICE — Device

## (undated) DEVICE — SKIN MARKER DUAL TIP WITH RULER CAP, FLEXIBLE RULER AND LABELS: Brand: DEVON

## (undated) DEVICE — STERILE CYSTO PACK: Brand: CARDINAL HEALTH

## (undated) DEVICE — SUT CHROMIC 4-0 PS-2 18 IN 1637G

## (undated) DEVICE — SURGICAL CLIPPER BLADE GENERAL USE

## (undated) DEVICE — GLOVE INDICATOR PI UNDERGLOVE SZ 7 BLUE

## (undated) DEVICE — URETERAL CATHETER 5 FR CONE TIP

## (undated) DEVICE — GLOVE SRG BIOGEL 8

## (undated) DEVICE — 3M™ STERI-STRIP™ COMPOUND BENZOIN TINCTURE 40 BAGS/CARTON 4 CARTONS/CASE C1544: Brand: 3M™ STERI-STRIP™

## (undated) DEVICE — ENDOSCOPIC VALVE WITH ADAPTER.: Brand: SURSEAL® II

## (undated) DEVICE — 3M™ TEGADERM™ TRANSPARENT FILM DRESSING FRAME STYLE, 1624W, 2-3/8 IN X 2-3/4 IN (6 CM X 7 CM), 100/CT 4CT/CASE: Brand: 3M™ TEGADERM™

## (undated) DEVICE — DRESSING XEROFORM 5 X 9